# Patient Record
Sex: FEMALE | Race: WHITE | HISPANIC OR LATINO | Employment: PART TIME | URBAN - METROPOLITAN AREA
[De-identification: names, ages, dates, MRNs, and addresses within clinical notes are randomized per-mention and may not be internally consistent; named-entity substitution may affect disease eponyms.]

---

## 2017-03-24 ENCOUNTER — ALLSCRIPTS OFFICE VISIT (OUTPATIENT)
Dept: OTHER | Facility: OTHER | Age: 32
End: 2017-03-24

## 2018-01-13 VITALS
HEIGHT: 67 IN | DIASTOLIC BLOOD PRESSURE: 60 MMHG | WEIGHT: 164 LBS | SYSTOLIC BLOOD PRESSURE: 100 MMHG | BODY MASS INDEX: 25.74 KG/M2

## 2018-04-03 ENCOUNTER — ANNUAL EXAM (OUTPATIENT)
Dept: OBGYN CLINIC | Facility: CLINIC | Age: 33
End: 2018-04-03
Payer: COMMERCIAL

## 2018-04-03 VITALS
SYSTOLIC BLOOD PRESSURE: 102 MMHG | WEIGHT: 174 LBS | DIASTOLIC BLOOD PRESSURE: 68 MMHG | HEIGHT: 67 IN | BODY MASS INDEX: 27.31 KG/M2

## 2018-04-03 DIAGNOSIS — F32.81 PREMENSTRUAL DYSPHORIC DISORDER: ICD-10-CM

## 2018-04-03 DIAGNOSIS — Z01.419 ENCNTR FOR GYN EXAM (GENERAL) (ROUTINE) W/O ABN FINDINGS: Primary | ICD-10-CM

## 2018-04-03 PROCEDURE — S0612 ANNUAL GYNECOLOGICAL EXAMINA: HCPCS | Performed by: PHYSICIAN ASSISTANT

## 2018-04-03 RX ORDER — PHENTERMINE HYDROCHLORIDE 15 MG/1
CAPSULE ORAL
Refills: 1 | COMMUNITY
Start: 2018-01-08 | End: 2019-04-05 | Stop reason: ALTCHOICE

## 2018-04-03 RX ORDER — DROSPIRENONE AND ETHINYL ESTRADIOL 0.03MG-3MG
KIT ORAL
Qty: 112 TABLET | Refills: 0 | OUTPATIENT
Start: 2018-04-03

## 2018-04-03 RX ORDER — ALPRAZOLAM 0.5 MG/1
TABLET ORAL
COMMUNITY
End: 2019-04-05 | Stop reason: ALTCHOICE

## 2018-04-03 RX ORDER — NORETHINDRONE ACETATE AND ETHINYL ESTRADIOL, AND FERROUS FUMARATE 1MG-20(24)
1 KIT ORAL DAILY
Qty: 28 CAPSULE | Refills: 3 | Status: SHIPPED | COMMUNITY
Start: 2018-04-03 | End: 2018-08-20 | Stop reason: SDUPTHER

## 2018-04-03 RX ORDER — ALPRAZOLAM 0.25 MG/1
TABLET ORAL
Refills: 0 | COMMUNITY
Start: 2018-03-26 | End: 2019-10-25 | Stop reason: ALTCHOICE

## 2018-04-03 RX ORDER — DROSPIRENONE AND ETHINYL ESTRADIOL 0.03MG-3MG
KIT ORAL
Refills: 3 | COMMUNITY
Start: 2018-01-11 | End: 2018-04-03

## 2018-04-03 RX ORDER — MULTIVITAMIN
TABLET ORAL
COMMUNITY

## 2018-04-03 RX ORDER — OMEPRAZOLE 20 MG/1
CAPSULE, DELAYED RELEASE ORAL
Refills: 1 | COMMUNITY
Start: 2018-03-26 | End: 2019-04-05 | Stop reason: ALTCHOICE

## 2018-04-03 NOTE — PROGRESS NOTES
Cierra SENIOR Darrell  1985      CC:  Yearly exam    S:  34 y o  female here for yearly exam  Her cycles are regular, not heavy or crampy  She is sexually active  She uses generic Keshia for contraception  She has significant PMS  In the past she didn't want to skip periods because she was always concerned that she could be pregnant  Last year we tried her on Prozac 10mg po daily for the week prior to her menses, however, she says this worsened things to the point of suicidal thoughts so she discontinued it  She believes that in the past she did very well on Loestrin 24 mood-wise but didn't like getting no bleed with this; at this point she is interested in retrying this  She did see a psychiatrist with [facility] in the past and was happy with her but her insurance changed and she could no longer see her; her insurance has now changed again and we discussed scheduling back with her  Last Pap 2016 (due 2019)      Current Outpatient Prescriptions:     ALPRAZolam (XANAX) 0 25 mg tablet, TK 1 T PO BID, Disp: , Rfl: 0    ALPRAZolam (XANAX) 0 5 mg tablet, Take by mouth, Disp: , Rfl:     Multiple Vitamin (MULTI-VITAMIN DAILY) TABS, Take by mouth, Disp: , Rfl:     NAPROXEN  MG EC tablet, TK 1 T PO BID, Disp: , Rfl: 0    omeprazole (PriLOSEC) 20 mg delayed release capsule, TK 1/2 once daily, Disp: , Rfl: 1    phentermine 15 MG capsule, TK 1 C PO ONCE A DAY, Disp: , Rfl: 1    Probiotic Product (PROBIOTIC-10) CAPS, Take by mouth, Disp: , Rfl:     MATTHEW 3-0 03 MG per tablet, TAKE 1 ACTIVE TABLET PO QD, Disp: , Rfl: 3  Social History     Social History    Marital status: /Civil Union     Spouse name: N/A    Number of children: N/A    Years of education: N/A     Occupational History    Not on file       Social History Main Topics    Smoking status: Never Smoker    Smokeless tobacco: Never Used    Alcohol use Yes      Comment: social    Drug use: No    Sexual activity: Yes     Partners: Male Birth control/ protection: OCP     Other Topics Concern    Not on file     Social History Narrative    Home environment domestic violence - denied      Family History   Problem Relation Age of Onset    Hyperlipidemia Mother     Heart disease Mother     Breast cancer Family     Diabetes Family     Heart disease Family     Colon cancer Maternal Uncle     Heart disease Father     Hyperlipidemia Father     Breast cancer Cousin       Past Medical History:   Diagnosis Date    Anxiety     Migraine         O:  Blood pressure 102/68, height 5' 6 93" (1 7 m), weight 78 9 kg (174 lb), last menstrual period 03/11/2018  Patient appears well and is not in distress  Neck is supple without masses  Breasts are symmetrical without mass, tenderness, nipple discharge, skin changes or adenopathy  Abdomen is soft and nontender without masses  External genitals are normal without lesions or rashes  Vagina is normal without discharge or bleeding  Cervix is normal without discharge or lesion  Uterus is normal, mobile, nontender without palpable mass  Adnexa are normal, nontender, without palpable mass  A:  Yearly exam      P:   Pap due 2019   Samples of [place] provided, will call with status report in a few    months   RTO one year for yearly exam or sooner as needed

## 2018-08-20 DIAGNOSIS — F32.81 PREMENSTRUAL DYSPHORIC DISORDER: Primary | ICD-10-CM

## 2018-08-20 RX ORDER — NORETHINDRONE ACETATE AND ETHINYL ESTRADIOL, AND FERROUS FUMARATE 1MG-20(24)
1 KIT ORAL DAILY
Qty: 90 CAPSULE | Refills: 2 | Status: SHIPPED | COMMUNITY
Start: 2018-08-20 | End: 2018-09-05 | Stop reason: SDUPTHER

## 2018-09-05 ENCOUNTER — TELEPHONE (OUTPATIENT)
Dept: OBGYN CLINIC | Facility: CLINIC | Age: 33
End: 2018-09-05

## 2018-09-05 DIAGNOSIS — F32.81 PREMENSTRUAL DYSPHORIC DISORDER: Primary | ICD-10-CM

## 2018-09-05 RX ORDER — NORETHINDRONE ACETATE AND ETHINYL ESTRADIOL, AND FERROUS FUMARATE 1MG-20(24)
1 KIT ORAL DAILY
Qty: 90 CAPSULE | Refills: 0 | Status: SHIPPED | COMMUNITY
Start: 2018-09-05 | End: 2019-02-25 | Stop reason: SDUPTHER

## 2019-02-25 DIAGNOSIS — F32.81 PREMENSTRUAL DYSPHORIC DISORDER: ICD-10-CM

## 2019-02-25 RX ORDER — NORETHINDRONE ACETATE AND ETHINYL ESTRADIOL, AND FERROUS FUMARATE 1MG-20(24)
1 KIT ORAL DAILY
Qty: 90 CAPSULE | Refills: 0 | Status: SHIPPED | COMMUNITY
Start: 2019-02-25 | End: 2019-04-05 | Stop reason: SDUPTHER

## 2019-02-27 ENCOUNTER — TELEPHONE (OUTPATIENT)
Dept: OBGYN CLINIC | Facility: CLINIC | Age: 34
End: 2019-02-27

## 2019-04-05 ENCOUNTER — ANNUAL EXAM (OUTPATIENT)
Dept: OBGYN CLINIC | Facility: CLINIC | Age: 34
End: 2019-04-05
Payer: COMMERCIAL

## 2019-04-05 VITALS
HEIGHT: 66 IN | DIASTOLIC BLOOD PRESSURE: 64 MMHG | WEIGHT: 178 LBS | BODY MASS INDEX: 28.61 KG/M2 | SYSTOLIC BLOOD PRESSURE: 126 MMHG

## 2019-04-05 DIAGNOSIS — F32.81 PREMENSTRUAL DYSPHORIC DISORDER: ICD-10-CM

## 2019-04-05 DIAGNOSIS — Z01.419 ENCNTR FOR GYN EXAM (GENERAL) (ROUTINE) W/O ABN FINDINGS: ICD-10-CM

## 2019-04-05 PROBLEM — F41.9 ANXIETY: Status: ACTIVE | Noted: 2019-04-05

## 2019-04-05 PROCEDURE — S0612 ANNUAL GYNECOLOGICAL EXAMINA: HCPCS | Performed by: PHYSICIAN ASSISTANT

## 2019-04-05 PROCEDURE — G0145 SCR C/V CYTO,THINLAYER,RESCR: HCPCS | Performed by: PHYSICIAN ASSISTANT

## 2019-04-05 PROCEDURE — 87624 HPV HI-RISK TYP POOLED RSLT: CPT | Performed by: PHYSICIAN ASSISTANT

## 2019-04-05 RX ORDER — OMEPRAZOLE 10 MG/1
CAPSULE, DELAYED RELEASE ORAL
Refills: 0 | COMMUNITY
Start: 2019-02-28 | End: 2021-02-22

## 2019-04-05 RX ORDER — NORETHINDRONE ACETATE AND ETHINYL ESTRADIOL, AND FERROUS FUMARATE 1MG-20(24)
1 KIT ORAL DAILY
Qty: 90 CAPSULE | Refills: 3 | Status: SHIPPED | COMMUNITY
Start: 2019-04-05 | End: 2019-05-24 | Stop reason: SDUPTHER

## 2019-04-08 LAB
HPV HR 12 DNA CVX QL NAA+PROBE: NEGATIVE
HPV16 DNA CVX QL NAA+PROBE: NEGATIVE
HPV18 DNA CVX QL NAA+PROBE: NEGATIVE

## 2019-04-09 LAB
LAB AP GYN PRIMARY INTERPRETATION: NORMAL
Lab: NORMAL

## 2019-05-24 DIAGNOSIS — F32.81 PREMENSTRUAL DYSPHORIC DISORDER: ICD-10-CM

## 2019-05-24 RX ORDER — NORETHINDRONE ACETATE AND ETHINYL ESTRADIOL, AND FERROUS FUMARATE 1MG-20(24)
KIT ORAL
Qty: 84 CAPSULE | Refills: 3 | Status: SHIPPED | OUTPATIENT
Start: 2019-05-24 | End: 2019-10-25 | Stop reason: HOSPADM

## 2019-09-26 ENCOUNTER — TELEPHONE (OUTPATIENT)
Dept: OBGYN CLINIC | Facility: CLINIC | Age: 34
End: 2019-09-26

## 2019-09-27 NOTE — TELEPHONE ENCOUNTER
Apt made for pt, she also said she is stopping he pill today,she cannot stand the side effects of it

## 2019-10-23 NOTE — PROGRESS NOTES
Dave Saleem Darrell  1985    S:  27 y o  female here for evaluation of abnormal vaginal bleeding  She has been on [place] since around April 2018 which has worked very well in terms of cycle control and control of her PMS  She started with irregualr bleeding 3-4 months ago  She can bleed every 2 weeks, as heavy as a normal period for her, or could even be a little heavier  She stopped her birth control pills on 9/27  She takes her pill at the same time every day  She is on no new medications, vitamins, or herbal supplements  She does use medicinal marijuana for her PMDD  She is sexually active with her  in a mutually monogamous relationship  She has no pain, bleeding or discomfort with intercourse  She denies vaginal discharge, itching or odor between bleeds       Past Medical History:   Diagnosis Date    Abnormal Pap smear of cervix     Anxiety     HPV (human papilloma virus) infection     Migraine      Family History   Problem Relation Age of Onset    Hyperlipidemia Mother     Heart disease Mother     Breast cancer Family     Diabetes Family     Heart disease Family     Colon cancer Maternal Uncle     Heart disease Father     Hyperlipidemia Father     Breast cancer Cousin      Social History     Socioeconomic History    Marital status: /Civil Union     Spouse name: None    Number of children: None    Years of education: None    Highest education level: None   Occupational History    None   Social Needs    Financial resource strain: None    Food insecurity:     Worry: None     Inability: None    Transportation needs:     Medical: None     Non-medical: None   Tobacco Use    Smoking status: Never Smoker    Smokeless tobacco: Never Used   Substance and Sexual Activity    Alcohol use: Yes     Comment: social    Drug use: Yes     Types: Marijuana    Sexual activity: Yes     Partners: Male     Birth control/protection: OCP   Lifestyle    Physical activity: Days per week: None     Minutes per session: None    Stress: None   Relationships    Social connections:     Talks on phone: None     Gets together: None     Attends Church service: None     Active member of club or organization: None     Attends meetings of clubs or organizations: None     Relationship status: None    Intimate partner violence:     Fear of current or ex partner: None     Emotionally abused: None     Physically abused: None     Forced sexual activity: None   Other Topics Concern    None   Social History Narrative    Home environment domestic violence - denied        Review of Systems   Respiratory: Negative  Cardiovascular: Negative  Gastrointestinal: Negative for constipation and diarrhea  Genitourinary: Negative for difficulty urinating, pelvic pain, vaginal bleeding, vaginal discharge, itching or odor  O:  /64 (BP Location: Right arm, Patient Position: Sitting, Cuff Size: Standard)   Wt 73 5 kg (162 lb)   LMP 10/16/2019   BMI 26 04 kg/m²   She appears well and is in no distress  Abdomen is soft and nontender  External genitals are normal without lesions or rashes  Vagina is normal, no discharge or bleeding noted  Cervix is normal, no lesions or discharge  Uterus is nontender, no masses  Adnexa are nontender, no pelvic masses appreciated    A/P:   Irregular bleeding  Check TSH, pelvic US  At this point she would prefer to observe and see how she does off of birth control  I did offer her a change to Cryselle versus Depo versus a Kyleena IUD - she will consider these

## 2019-10-25 ENCOUNTER — OFFICE VISIT (OUTPATIENT)
Dept: OBGYN CLINIC | Facility: CLINIC | Age: 34
End: 2019-10-25
Payer: COMMERCIAL

## 2019-10-25 VITALS — BODY MASS INDEX: 26.04 KG/M2 | WEIGHT: 162 LBS | DIASTOLIC BLOOD PRESSURE: 64 MMHG | SYSTOLIC BLOOD PRESSURE: 120 MMHG

## 2019-10-25 DIAGNOSIS — N93.9 ABNORMAL UTERINE BLEEDING (AUB): ICD-10-CM

## 2019-10-25 DIAGNOSIS — Z11.3 SCREEN FOR STD (SEXUALLY TRANSMITTED DISEASE): ICD-10-CM

## 2019-10-25 PROBLEM — N94.3 PREMENSTRUAL SYNDROME: Status: ACTIVE | Noted: 2017-03-24

## 2019-10-25 PROCEDURE — 99213 OFFICE O/P EST LOW 20 MIN: CPT | Performed by: PHYSICIAN ASSISTANT

## 2019-11-04 ENCOUNTER — HOSPITAL ENCOUNTER (OUTPATIENT)
Dept: RADIOLOGY | Age: 34
Discharge: HOME | End: 2019-11-04
Payer: COMMERCIAL

## 2019-11-04 DIAGNOSIS — N93.9 ABNORMAL UTERINE BLEEDING (AUB): ICD-10-CM

## 2019-11-04 PROCEDURE — 76830 TRANSVAGINAL US NON-OB: CPT

## 2019-11-04 PROCEDURE — 76856 US EXAM PELVIC COMPLETE: CPT

## 2019-11-05 ENCOUNTER — TELEPHONE (OUTPATIENT)
Dept: OBGYN CLINIC | Facility: CLINIC | Age: 34
End: 2019-11-05

## 2019-11-05 LAB — TSH SERPL-ACNC: 0.5 MIU/L

## 2019-11-05 NOTE — TELEPHONE ENCOUNTER
----- Message from J Luis Kwan PA-C sent at 11/5/2019 11:50 AM EST -----  Please let raulito know that her ultrasound has returned  She has a small ovarian cyst which I doubt is causing any issues  The uterus does show a probable endometrial polyp  This could be part of the reason for her abnormal bleeding  I would bring her in for a [facility]  consult with a physician

## 2019-11-05 NOTE — TELEPHONE ENCOUNTER
Patient is aware of her results and is scheduled to see Dr Clinton Gillette 11/18 for [facility]  consult

## 2019-11-18 ENCOUNTER — OFFICE VISIT (OUTPATIENT)
Dept: OBGYN CLINIC | Facility: CLINIC | Age: 34
End: 2019-11-18
Payer: COMMERCIAL

## 2019-11-18 VITALS — SYSTOLIC BLOOD PRESSURE: 120 MMHG | BODY MASS INDEX: 26.55 KG/M2 | WEIGHT: 165.2 LBS | DIASTOLIC BLOOD PRESSURE: 70 MMHG

## 2019-11-18 DIAGNOSIS — N93.9 ABNORMAL UTERINE BLEEDING (AUB): Primary | ICD-10-CM

## 2019-11-18 DIAGNOSIS — N84.0 ENDOMETRIAL POLYP: ICD-10-CM

## 2019-11-18 PROCEDURE — 99213 OFFICE O/P EST LOW 20 MIN: CPT | Performed by: STUDENT IN AN ORGANIZED HEALTH CARE EDUCATION/TRAINING PROGRAM

## 2019-11-18 NOTE — PROGRESS NOTES
GYN Consult, [facility] - Gynecology   Irma Lindy Ramírez 27 y o  female MRN: [id number]  Unit/Bed#: [facility]  Encounter: [id number]    Chief Complaint   Patient presents with    Consult     consult for [facility]         History of Present Illness     HPI:  Amanda Abebe is a 27 y o  female [address] who presents for consult regarding D+C  She has been experience abnormal uterine bleeding ever 2 weeks, heavy, soaking through super tampon every hour  She was seen by Carina Orr PA-C at which point a TSH and ultrasound were ordered  On transvaginal ultrasound she was noted to have and endometrial polyp  She was subsequently referred to me for surgical consultation  In speaking with Marli Mike today she is expecting her period soon  Heavy bleeding as described above  Does desire removal     Presents today with her mother  Review of systems:  12 point review of systems negative aside from HPI      Past Medical History:   Diagnosis Date    Abnormal Pap smear of cervix     Anxiety     HPV (human papilloma virus) infection     Migraine      Patient Active Problem List   Diagnosis    Anxiety    Premenstrual syndrome       Past Surgical History:   Procedure Laterality Date    COLPOSCOPY      with biopsy with endocervical currettage     CONTRACEPTIVE CAPSULE REMOVAL      Last assessed: 2014       OB History        1    Para   0    Term   0            AB   1    Living   0       SAB        TAB   1    Ectopic        Multiple        Live Births                     Family History   Problem Relation Age of Onset    Hyperlipidemia Mother     Heart disease Mother     Breast cancer Family     Diabetes Family     Heart disease Family     Colon cancer Maternal Uncle     Heart disease Father     Hyperlipidemia Father     Breast cancer Cousin      Social History   Social History     Substance and Sexual Activity   Alcohol Use Yes    Comment: social     Social History     Substance and Sexual Activity   Drug Use Yes    Types: Marijuana     Social History     Tobacco Use   Smoking Status Never Smoker   Smokeless Tobacco Never Used           Current Outpatient Medications:     Multiple Vitamin (MULTI-VITAMIN DAILY) TABS, Take by mouth, Disp: , Rfl:     omeprazole (PriLOSEC) 10 mg delayed release capsule, TAKE ONE CAPSULE BY MOUTH EVERY DAY, Disp: , Rfl: 0    Probiotic Product (PROBIOTIC-10) CAPS, Take by mouth, Disp: , Rfl:    No Known Allergies    Objective   Vitals: Blood pressure 120/70, weight 74 9 kg (165 lb 3 2 oz), last menstrual period 10/16/2019  Gen: NAD, AAOx3  Heart: RRR  Lungs: CTAB  Ext: non-tender to palpation    Pelvic deferred      Lab Results:   No visits with results within 1 Day(s) from this visit  Latest known visit with results is:   Orders Only on 11/04/2019   Component Date Value    TSH W/RFX TO FREE T4 11/04/2019 0 50         Assessment/Plan     Assessment:  27 y o  Jerilyn Oms with endometrial polyp, abnormal uterine bleeding  Plan:    Reviewed endometrial polyps, likely source of heavy bleeding  Also reviewed removal as polyps may hide abnormal cells, precancerous cells  She and her mother are in agreement  Consents reviewed and signed today for dilation and curettage, hysteroscopy, and polypectomy  Will be in touch [facility] surgery schedulers regarding scheduling  We discussed the possibility of performing this procedure as an office procedure - will determine if she is a candidate, also openings in the schedule  If not, will plan for surgery outpatient at [address] [place] [address] and her mother's questions were answered to their stated satisfaction

## 2019-11-20 PROBLEM — N84.0 ENDOMETRIAL POLYP: Status: ACTIVE | Noted: 2019-11-20

## 2019-11-20 PROBLEM — N93.9 ABNORMAL UTERINE BLEEDING: Status: ACTIVE | Noted: 2019-11-20

## 2019-12-26 ENCOUNTER — TELEPHONE (OUTPATIENT)
Dept: PSYCHIATRY | Facility: CLINIC | Age: 34
End: 2019-12-26

## 2019-12-26 RX ORDER — PHENTERMINE HYDROCHLORIDE 15 MG/1
15 CAPSULE ORAL EVERY MORNING
COMMUNITY
End: 2020-06-29

## 2019-12-26 RX ORDER — ASCORBIC ACID 500 MG
500 TABLET ORAL DAILY
COMMUNITY

## 2019-12-26 NOTE — PRE-PROCEDURE INSTRUCTIONS
Pre-Surgery Instructions:   Medication Instructions    ascorbic acid (VITAMIN C) 500 mg tablet Instructed patient per Anesthesia Guidelines   Multiple Vitamin (MULTI-VITAMIN DAILY) TABS Instructed patient per Anesthesia Guidelines   omeprazole (PriLOSEC) 10 mg delayed release capsule Instructed patient per Anesthesia Guidelines   phentermine 15 MG capsule Instructed patient per Anesthesia Guidelines   Probiotic Product (PROBIOTIC-10) CAPS Instructed patient per Anesthesia Guidelines  Pt instructed to stop NSAIDS and supplements one week prior to surgery  Pt verbalized understanding of shower instructions  Pt instructed to take prilosec on day of surgery with 1-2 sips of water  Pt instructed to NOT use medical marijuana on day of surgery

## 2019-12-26 NOTE — PRE-PROCEDURE INSTRUCTIONS
Pre-Surgery Instructions:   Medication Instructions    ascorbic acid (VITAMIN C) 500 mg tablet Instructed patient per Anesthesia Guidelines   Multiple Vitamin (MULTI-VITAMIN DAILY) TABS Instructed patient per Anesthesia Guidelines   omeprazole (PriLOSEC) 10 mg delayed release capsule Instructed patient per Anesthesia Guidelines   phentermine 15 MG capsule Instructed patient per Anesthesia Guidelines   Probiotic Product (PROBIOTIC-10) CAPS Instructed patient per Anesthesia Guidelines

## 2019-12-30 PROCEDURE — 99024 POSTOP FOLLOW-UP VISIT: CPT | Performed by: STUDENT IN AN ORGANIZED HEALTH CARE EDUCATION/TRAINING PROGRAM

## 2019-12-30 NOTE — H&P
H+P - Gynecology   Jeff Ramírez 27 y o  female MRN: [id number]  [facility]  Encounter: [id number]    CC: scheduled D+C hysteroscopy polypectomy    History of Present Illness     HPI:  Robert Brooks is a 27 y o  female [address] who presents for consult regarding D+C  She has been experience abnormal uterine bleeding ever 2 weeks, heavy, soaking through super tampon every hour  She was seen by Krystyna Pride PA-C at which point a TSH and ultrasound were ordered  On transvaginal ultrasound she was noted to have and endometrial polyp  She was subsequently referred to me for surgical consultation  In speaking with Mimi Nevarez today she is expecting her period soon  Heavy bleeding as described above    Does desire removal       REVIEW OF SYSTEMS  12 point ROS neg    Past Medical History:   Diagnosis Date    Abnormal Pap smear of cervix     Anxiety     HPV (human papilloma virus) infection     Migraine        Patient Active Problem List   Diagnosis    Anxiety    Premenstrual syndrome    Endometrial polyp    Abnormal uterine bleeding       Past Surgical History:   Procedure Laterality Date    COLPOSCOPY      with biopsy with endocervical currettage     CONTRACEPTIVE CAPSULE REMOVAL      Last assessed: 2014       OB History        1    Para   0    Term   0            AB   1    Living   0       SAB        TAB   1    Ectopic        Multiple        Live Births                     #: 1, Date: None, Sex: None, Weight: None, GA: None, Delivery: None, Apgar1: None, Apgar5: None, Living: None, Birth Comments: None       Family History   Problem Relation Age of Onset    Hyperlipidemia Mother     Heart disease Mother     Breast cancer Family     Diabetes Family     Heart disease Family     Colon cancer Maternal Uncle     Heart disease Father     Hyperlipidemia Father     Breast cancer Cousin        Social History   Social History     Substance and Sexual Activity   Alcohol Use Yes    Frequency: 2-3 times a week    Drinks per session: 1 or 2    Binge frequency: Never     Social History     Substance and Sexual Activity   Drug Use Yes    Types: Marijuana    Comment: medical marijuana      Social History     Tobacco Use   Smoking Status Never Smoker   Smokeless Tobacco Never Used       No current facility-administered medications for this encounter  Current Outpatient Medications:     ascorbic acid (VITAMIN C) 500 mg tablet, Take 500 mg by mouth daily, Disp: , Rfl:     Multiple Vitamin (MULTI-VITAMIN DAILY) TABS, Take by mouth, Disp: , Rfl:     omeprazole (PriLOSEC) 10 mg delayed release capsule, TAKE ONE CAPSULE BY MOUTH EVERY DAY, Disp: , Rfl: 0    phentermine 15 MG capsule, Take 15 mg by mouth every morning, Disp: , Rfl:     Probiotic Product (PROBIOTIC-10) CAPS, Take by mouth daily , Disp: , Rfl:     No Known Allergies    Objective    From visit 11/18/2019    Vitals: Blood pressure 120/70, weight 74 9 kg (165 lb 3 2 oz), last menstrual period 10/16/2019  Gen: NAD, AAOx3  Heart: RRR  Lungs: CTAB  Ext: non-tender to palpation     Pelvic deferred      Lab Results:   No visits with results within 1 Day(s) from this visit  Latest known visit with results is:   Orders Only on 11/04/2019   Component Date Value    TSH W/RFX TO FREE T4 11/04/2019 0 50         Assessment/Plan   From 11/18/2019    Assessment:  27 y o  Edger Labor with endometrial polyp, abnormal uterine bleeding  Plan:     Reviewed endometrial polyps, likely source of heavy bleeding  Also reviewed removal as polyps may hide abnormal cells, precancerous cells  She and her mother are in agreement  Consents reviewed and signed today for dilation and curettage, hysteroscopy, and polypectomy  Plan to proceed with procedure as documented

## 2020-01-05 ENCOUNTER — ANESTHESIA EVENT (OUTPATIENT)
Dept: PERIOP | Facility: HOSPITAL | Age: 35
End: 2020-01-05
Payer: COMMERCIAL

## 2020-01-05 NOTE — ANESTHESIA PREPROCEDURE EVALUATION
Review of Systems/Medical History  Patient summary reviewed  Chart reviewed      Cardiovascular  Negative cardio ROS    Pulmonary  Negative pulmonary ROS        GI/Hepatic  Negative GI/hepatic ROS               Endo/Other  Negative endo/other ROS      GYN      Comment: Dysfunction uterine bleed     Hematology  Negative hematology ROS      Musculoskeletal  Negative musculoskeletal ROS        Neurology    Headaches,    Psychology   Anxiety,              Physical Exam    Airway    Mallampati score: II  TM Distance: >3 FB  Neck ROM: full     Dental       Cardiovascular  Comment: Negative ROS,     Pulmonary      Other Findings        Anesthesia Plan  ASA Score- 2     Anesthesia Type- general with ASA Monitors  Additional Monitors:   Airway Plan: LMA  Plan Factors-    Induction- intravenous  Postoperative Plan- Plan for postoperative opioid use  Planned trial extubation    Informed Consent- Anesthetic plan and risks discussed with patient and mother  I personally reviewed this patient with the CRNA  Discussed and agreed on the Anesthesia Plan with the CRNA  Dav Coe

## 2020-01-06 ENCOUNTER — HOSPITAL ENCOUNTER (OUTPATIENT)
Facility: HOSPITAL | Age: 35
Setting detail: OUTPATIENT SURGERY
Discharge: HOME | End: 2020-01-06
Attending: STUDENT IN AN ORGANIZED HEALTH CARE EDUCATION/TRAINING PROGRAM | Admitting: STUDENT IN AN ORGANIZED HEALTH CARE EDUCATION/TRAINING PROGRAM
Payer: COMMERCIAL

## 2020-01-06 ENCOUNTER — ANESTHESIA (OUTPATIENT)
Dept: PERIOP | Facility: HOSPITAL | Age: 35
End: 2020-01-06
Payer: COMMERCIAL

## 2020-01-06 VITALS
HEART RATE: 72 BPM | DIASTOLIC BLOOD PRESSURE: 61 MMHG | OXYGEN SATURATION: 100 % | SYSTOLIC BLOOD PRESSURE: 114 MMHG | TEMPERATURE: 98.7 F | RESPIRATION RATE: 18 BRPM | HEIGHT: 66 IN | BODY MASS INDEX: 25.71 KG/M2 | WEIGHT: 160 LBS

## 2020-01-06 DIAGNOSIS — Z98.890 S/P DILATION AND CURETTAGE: Primary | ICD-10-CM

## 2020-01-06 DIAGNOSIS — N84.0 ENDOMETRIAL POLYP: ICD-10-CM

## 2020-01-06 DIAGNOSIS — N93.9 ABNORMAL UTERINE BLEEDING: ICD-10-CM

## 2020-01-06 PROCEDURE — 88305 TISSUE EXAM BY PATHOLOGIST: CPT | Performed by: PATHOLOGY

## 2020-01-06 PROCEDURE — 58558 HYSTEROSCOPY BIOPSY: CPT | Performed by: STUDENT IN AN ORGANIZED HEALTH CARE EDUCATION/TRAINING PROGRAM

## 2020-01-06 RX ORDER — LIDOCAINE HYDROCHLORIDE 10 MG/ML
INJECTION, SOLUTION EPIDURAL; INFILTRATION; INTRACAUDAL; PERINEURAL AS NEEDED
Status: DISCONTINUED | OUTPATIENT
Start: 2020-01-06 | End: 2020-01-06 | Stop reason: SURG

## 2020-01-06 RX ORDER — ONDANSETRON 2 MG/ML
4 INJECTION INTRAMUSCULAR; INTRAVENOUS ONCE AS NEEDED
Status: DISCONTINUED | OUTPATIENT
Start: 2020-01-06 | End: 2020-01-06 | Stop reason: HOSPADM

## 2020-01-06 RX ORDER — IBUPROFEN 200 MG
600 TABLET ORAL EVERY 6 HOURS PRN
Refills: 0
Start: 2020-01-06

## 2020-01-06 RX ORDER — ONDANSETRON 2 MG/ML
INJECTION INTRAMUSCULAR; INTRAVENOUS AS NEEDED
Status: DISCONTINUED | OUTPATIENT
Start: 2020-01-06 | End: 2020-01-06 | Stop reason: SURG

## 2020-01-06 RX ORDER — PROPOFOL 10 MG/ML
INJECTION, EMULSION INTRAVENOUS AS NEEDED
Status: DISCONTINUED | OUTPATIENT
Start: 2020-01-06 | End: 2020-01-06 | Stop reason: SURG

## 2020-01-06 RX ORDER — KETOROLAC TROMETHAMINE 30 MG/ML
INJECTION, SOLUTION INTRAMUSCULAR; INTRAVENOUS AS NEEDED
Status: DISCONTINUED | OUTPATIENT
Start: 2020-01-06 | End: 2020-01-06 | Stop reason: SURG

## 2020-01-06 RX ORDER — ACETAMINOPHEN 500 MG
500 TABLET ORAL EVERY 6 HOURS PRN
Qty: 30 TABLET | Refills: 0
Start: 2020-01-06 | End: 2020-01-20

## 2020-01-06 RX ORDER — FENTANYL CITRATE/PF 50 MCG/ML
25 SYRINGE (ML) INJECTION
Status: DISCONTINUED | OUTPATIENT
Start: 2020-01-06 | End: 2020-01-06 | Stop reason: HOSPADM

## 2020-01-06 RX ORDER — SODIUM CHLORIDE, SODIUM LACTATE, POTASSIUM CHLORIDE, CALCIUM CHLORIDE 600; 310; 30; 20 MG/100ML; MG/100ML; MG/100ML; MG/100ML
125 INJECTION, SOLUTION INTRAVENOUS CONTINUOUS
Status: DISCONTINUED | OUTPATIENT
Start: 2020-01-06 | End: 2020-01-06 | Stop reason: HOSPADM

## 2020-01-06 RX ORDER — MAGNESIUM HYDROXIDE 1200 MG/15ML
LIQUID ORAL AS NEEDED
Status: DISCONTINUED | OUTPATIENT
Start: 2020-01-06 | End: 2020-01-06 | Stop reason: HOSPADM

## 2020-01-06 RX ORDER — EPHEDRINE SULFATE 50 MG/ML
INJECTION INTRAVENOUS AS NEEDED
Status: DISCONTINUED | OUTPATIENT
Start: 2020-01-06 | End: 2020-01-06 | Stop reason: SURG

## 2020-01-06 RX ORDER — LIDOCAINE HYDROCHLORIDE 10 MG/ML
0.5 INJECTION, SOLUTION EPIDURAL; INFILTRATION; INTRACAUDAL; PERINEURAL ONCE AS NEEDED
Status: COMPLETED | OUTPATIENT
Start: 2020-01-06 | End: 2020-01-06

## 2020-01-06 RX ORDER — KETOROLAC TROMETHAMINE 30 MG/ML
30 INJECTION, SOLUTION INTRAMUSCULAR; INTRAVENOUS EVERY 6 HOURS
Status: DISCONTINUED | OUTPATIENT
Start: 2020-01-06 | End: 2020-01-06 | Stop reason: HOSPADM

## 2020-01-06 RX ORDER — SODIUM CHLORIDE, SODIUM LACTATE, POTASSIUM CHLORIDE, CALCIUM CHLORIDE 600; 310; 30; 20 MG/100ML; MG/100ML; MG/100ML; MG/100ML
125 INJECTION, SOLUTION INTRAVENOUS CONTINUOUS
Status: DISCONTINUED | OUTPATIENT
Start: 2020-01-06 | End: 2020-01-06

## 2020-01-06 RX ORDER — DEXAMETHASONE SODIUM PHOSPHATE 10 MG/ML
INJECTION, SOLUTION INTRAMUSCULAR; INTRAVENOUS AS NEEDED
Status: DISCONTINUED | OUTPATIENT
Start: 2020-01-06 | End: 2020-01-06 | Stop reason: SURG

## 2020-01-06 RX ORDER — HYDROMORPHONE HCL/PF 1 MG/ML
0.5 SYRINGE (ML) INJECTION
Status: DISCONTINUED | OUTPATIENT
Start: 2020-01-06 | End: 2020-01-06 | Stop reason: HOSPADM

## 2020-01-06 RX ORDER — MIDAZOLAM HYDROCHLORIDE 2 MG/2ML
INJECTION, SOLUTION INTRAMUSCULAR; INTRAVENOUS AS NEEDED
Status: DISCONTINUED | OUTPATIENT
Start: 2020-01-06 | End: 2020-01-06 | Stop reason: SURG

## 2020-01-06 RX ORDER — FENTANYL CITRATE 50 UG/ML
INJECTION, SOLUTION INTRAMUSCULAR; INTRAVENOUS AS NEEDED
Status: DISCONTINUED | OUTPATIENT
Start: 2020-01-06 | End: 2020-01-06 | Stop reason: SURG

## 2020-01-06 RX ORDER — ACETAMINOPHEN 325 MG/1
650 TABLET ORAL EVERY 6 HOURS PRN
Status: DISCONTINUED | OUTPATIENT
Start: 2020-01-06 | End: 2020-01-06 | Stop reason: HOSPADM

## 2020-01-06 RX ADMIN — LIDOCAINE HYDROCHLORIDE 0.5 ML: 10 INJECTION, SOLUTION EPIDURAL; INFILTRATION; INTRACAUDAL; PERINEURAL at 08:08

## 2020-01-06 RX ADMIN — MIDAZOLAM 2 MG: 1 INJECTION INTRAMUSCULAR; INTRAVENOUS at 08:06

## 2020-01-06 RX ADMIN — PROPOFOL 150 MG: 10 INJECTION, EMULSION INTRAVENOUS at 08:11

## 2020-01-06 RX ADMIN — KETOROLAC TROMETHAMINE 30 MG: 30 INJECTION, SOLUTION INTRAMUSCULAR at 08:39

## 2020-01-06 RX ADMIN — SODIUM CHLORIDE, SODIUM LACTATE, POTASSIUM CHLORIDE, AND CALCIUM CHLORIDE 125 ML/HR: .6; .31; .03; .02 INJECTION, SOLUTION INTRAVENOUS at 08:08

## 2020-01-06 RX ADMIN — LIDOCAINE HYDROCHLORIDE 100 MG: 10 INJECTION, SOLUTION EPIDURAL; INFILTRATION; INTRACAUDAL; PERINEURAL at 08:11

## 2020-01-06 RX ADMIN — DEXAMETHASONE SODIUM PHOSPHATE 5 MG: 10 INJECTION, SOLUTION INTRAMUSCULAR; INTRAVENOUS at 08:14

## 2020-01-06 RX ADMIN — SODIUM CHLORIDE, SODIUM LACTATE, POTASSIUM CHLORIDE, AND CALCIUM CHLORIDE: .6; .31; .03; .02 INJECTION, SOLUTION INTRAVENOUS at 08:11

## 2020-01-06 RX ADMIN — EPHEDRINE SULFATE 15 MG: 50 INJECTION, SOLUTION INTRAVENOUS at 08:15

## 2020-01-06 RX ADMIN — FENTANYL CITRATE 100 MCG: 50 INJECTION, SOLUTION INTRAMUSCULAR; INTRAVENOUS at 08:11

## 2020-01-06 RX ADMIN — ONDANSETRON 4 MG: 2 INJECTION INTRAMUSCULAR; INTRAVENOUS at 08:14

## 2020-01-06 NOTE — ANESTHESIA POSTPROCEDURE EVALUATION
Post-Op Assessment Note    CV Status:  Stable  Pain Score: 0    Pain management: adequate     Mental Status:  Alert and awake   Hydration Status:  Euvolemic   PONV Controlled:  Controlled   Airway Patency:  Patent   Post Op Vitals Reviewed: Yes      Staff: Anesthesiologist, CRNA           /74   Temp   97 8   Pulse  58   Resp   12   SpO2   100

## 2020-01-06 NOTE — DISCHARGE INSTRUCTIONS
Dilation and Curettage   WHAT YOU SHOULD KNOW:   Dilation and curettage (D and C) is a procedure to remove tissue from the lining of your uterus  AFTER YOU LEAVE:   Medicines:   · Pain medicine: You may be given medicine to take away or decrease pain  Do not wait until the pain is severe before you take your medicine  · Antibiotics: This medicine will help fight or prevent an infection  · Take your medicine as directed  Call your healthcare provider if you think your medicine is not helping or if you have side effects  Tell him if you are allergic to any medicine  Keep a list of the medicines, vitamins, and herbs you take  Include the amounts, and when and why you take them  Bring the list or the pill bottles to follow-up visits  Carry your medicine list with you in case of an emergency  Follow up with your healthcare provider as directed:  Write down your questions so you remember to ask them during your visits  Activity:  Ask your primary healthcare provider when you can return to your normal activities  Contact your primary healthcare provider if:   · You have foul-smelling vaginal discharge  · You do not get your monthly period  · You feel depressed or anxious  · You feel very tired and weak  · You have questions or concerns about your condition or care  Seek care immediately or call 911 if:   · You have heavy vaginal bleeding that soaks 1 pad in 1 hour for 2 hours in a row  · You have a fever and abdominal cramps  · Your pain does not get better, even after treatment  © 2014 [employer] is for End User's use only and may not be sold, redistributed or otherwise used for commercial purposes  All illustrations and images included in CareNotes® are the copyrighted property of [employer] , [employer]  or Raj Estevez  The above information is an  only  It is not intended as medical advice for individual conditions or treatments  Talk to your doctor, nurse or pharmacist before following any medical regimen to see if it is safe and effective for you

## 2020-01-06 NOTE — OP NOTE
OPERATIVE REPORT  PATIENT NAME: Derrick Solorzano    :  1985  MRN: [id number]  Pt Location:  OR ROOM 03    SURGERY DATE: 2020    Surgeon(s) and Role:     * Aaron Hunt MD - Primary     * Radha Best DO - Assisting    Preop Diagnosis:  Endometrial polyp [N84 0]  Abnormal uterine bleeding [N93 9]    Post-Op Diagnosis Codes:     * Endometrial polyp [N84 0]     * Abnormal uterine bleeding [N93 9]    Procedure(s) (LRB):  DILATATION AND CURETTAGE (D&C) WITH HYSTEROSCOPY Polypectomy (N/A)    Specimen(s):  ID Type Source Tests Collected by Time Destination   1 : [facility] Tissue Endometrium TISSUE EXAM Aaron Hunt MD 2020 0300    2 : POLYP Tissue Endometrium TISSUE EXAM Aaron Hunt MD 2020 0390        Estimated Blood Loss:   Minimal    Drains:  None    Anesthesia Type:   General    Operative Indications:  Endometrial polyp [N84 0]  Abnormal uterine bleeding [N93 9]    Operative Findings:  Grossly normal appearing external genitalia  On bimanual examination, uterus is noted to be anteverted, mobile, and of normal size and contour  No adnexal masses or fullness appreciated  Uterus sounds to 8cm  On hysteroscopic examination, bilateral fallopian ostia noted  Small, single polypoid structure noted within the lower uterine segment with a base at approximately 1 o'clock  Following procedure, excellent hemostasis appreciated  Total fluid deficit noted to be 100cc at completion of procedure    Complications:   None apparent    Procedure and Technique:  Brief History  All risks, benefits, and alternatives to the procedure were discussed with the patient and she had the opportunity to ask questions  Informed consent was obtained  Description of Procedure  Patient was taken to the operating room were a time out was performed to confirm correct patient and correct procedure   General LMA anesthesia (LMA) was administered and the patient was positioned on the OR table in the dorsal lithotomy position  All pressure points were padded and a olivia hugger was placed to maintain control of core body temperature  A bimanual exam was performed and the uterus was noted to be anteverted, normal in size and consistency with no palpable adnexal masses or fullness  The patient was prepped and draped in the usual sterile fashion  Operative Technique  A weighted speculum was inserted into the vagina and a Richardson retractor was used to visualize the anterior lip of the cervix, which was then grasped with a single toothed tenaculum  The uterus was sounded to 8cm  The cervix was serially dilated to 27 [address] using Rizo dilators for introduction of the hysteroscope  Hysteroscope was introduced under direct visualization using normal saline solution as the distention media  Hysteroscope was advanced to the uterine fundus and the entire uterine cavity was inspected in a systematic manner  There was noted to be the above findings  The myosure device was then introduced through the hysteroscope and the polypoid structure was resected in entirety under direct visualization  Hysteroscope with myosure device was then withdrawn and sharp curetting was performed, starting at the 12'oclock position and rotating a total of 360 degrees to cover all surfaces  Endometrial tissue was obtained and sent for pathology  The single toothed tenaculum was removed from the anterior lip of the cervix  Good hemostasis was confirmed at the tenaculum puncture sites  Weighted speculum was then removed from the vagina  Total fluid deficit at completion of procedure was noted to be 100cc  At the conclusion of the procedure, all needle, sponge, and instrument counts were noted to be correct x2  Patient tolerated the procedure well and was transferred to PACU in stable condition prior to discharge with follow up in 1-2 weeks  Dr Vera Salguero was present and participated in all key portions of the case      Patient Disposition:  PACU , hemodynamically stable and extubated and stable    SIGNATURE: Henry Andrea DO  DATE: January 6, 2020  TIME: 8:51 AM

## 2020-01-06 NOTE — INTERVAL H&P NOTE
H&P reviewed  After examining the patient I find no changes in the patients condition since the H&P had been written      Vitals:    01/06/20 0734   BP: 102/65   Pulse: 79   Resp: 18   Temp: 99 2 °F (37 3 °C)   SpO2: 99%

## 2020-01-20 ENCOUNTER — OFFICE VISIT (OUTPATIENT)
Dept: OBGYN CLINIC | Facility: CLINIC | Age: 35
End: 2020-01-20

## 2020-01-20 VITALS — SYSTOLIC BLOOD PRESSURE: 92 MMHG | DIASTOLIC BLOOD PRESSURE: 66 MMHG | WEIGHT: 161.6 LBS | BODY MASS INDEX: 26.08 KG/M2

## 2020-01-20 DIAGNOSIS — Z98.890 S/P DILATION AND CURETTAGE: Primary | ICD-10-CM

## 2020-01-20 PROCEDURE — 99024 POSTOP FOLLOW-UP VISIT: CPT | Performed by: STUDENT IN AN ORGANIZED HEALTH CARE EDUCATION/TRAINING PROGRAM

## 2020-01-20 RX ORDER — OMEPRAZOLE 40 MG/1
CAPSULE, DELAYED RELEASE ORAL
COMMUNITY
Start: 2020-01-08 | End: 2021-02-22

## 2020-01-20 RX ORDER — OMEPRAZOLE 20 MG/1
20 CAPSULE, DELAYED RELEASE ORAL DAILY
COMMUNITY
Start: 2020-01-08 | End: 2021-02-22 | Stop reason: SDUPTHER

## 2020-01-20 NOTE — PROGRESS NOTES
Post operative visit - Gynecology   Dyan Siegel 27 y o  female MRN: [id number]  89 M  [facility]  Encounter: [id number]    Chief Complaint   Patient presents with    Post-op     Had a D & C with hysteroscopy polyectomy on 2020  Had an Endometrial polyp and AUB  Pt reports doing really with no VB or pain  Is sexually active  History of Present Illness     Dyan Siegel is a 27 y o  female [address] who presents for postoperative visit  Concerns today: none    REVIEW OF SYSTEMS  12 point review of systems negative aside from HPI      Past Medical History:   Diagnosis Date    Abnormal Pap smear of cervix     Anxiety     HPV (human papilloma virus) infection     Migraine      Patient Active Problem List   Diagnosis    Anxiety    Premenstrual syndrome    Endometrial polyp    Abnormal uterine bleeding    S/P dilation and curettage       Past Surgical History:   Procedure Laterality Date    COLPOSCOPY      with biopsy with endocervical currettage     CONTRACEPTIVE CAPSULE REMOVAL      Last assessed: 2014    OH HYSTEROSCOPY,W/ENDO BX N/A 2020    Procedure: DILATATION AND CURETTAGE (D&C) WITH HYSTEROSCOPY Polypectomy;  Surgeon: Richar Collins MD;  Location: [facility];  Service: Gynecology       OB History        1    Para   0    Term   0            AB   1    Living   0       SAB        TAB   1    Ectopic        Multiple        Live Births                         Social History   Social History     Substance and Sexual Activity   Alcohol Use Yes    Frequency: 2-3 times a week    Drinks per session: 1 or 2    Binge frequency: Never     Social History     Substance and Sexual Activity   Drug Use Yes    Types: Marijuana    Comment: medical marijuana      Social History     Tobacco Use   Smoking Status Never Smoker   Smokeless Tobacco Never Used         Current Outpatient Medications:     ascorbic acid (VITAMIN C) 500 mg tablet, Take 500 mg by mouth daily, Disp: , Rfl:     ibuprofen (MOTRIN) 200 mg tablet, Take 3 tablets (600 mg total) by mouth every 6 (six) hours as needed for cramping (Patient taking differently: Take 600 mg by mouth as needed for cramping ), Disp: , Rfl: 0    Multiple Vitamin (MULTI-VITAMIN DAILY) TABS, Take by mouth, Disp: , Rfl:     omeprazole (PriLOSEC) 10 mg delayed release capsule, TAKE ONE CAPSULE BY MOUTH EVERY DAY, Disp: , Rfl: 0    omeprazole (PriLOSEC) 20 mg delayed release capsule, , Disp: , Rfl:     omeprazole (PriLOSEC) 40 MG capsule, , Disp: , Rfl:     phentermine 15 MG capsule, Take 15 mg by mouth every morning, Disp: , Rfl:     Probiotic Product (PROBIOTIC-10) CAPS, Take by mouth daily , Disp: , Rfl:     No Known Allergies    Objective   Vitals: Blood pressure 92/66, weight 73 3 kg (161 lb 9 6 oz)  Body mass index is 26 08 kg/m²  General: NAD, AAOx3  Heart: RRR  Lungs: CTAB    Speculum exam: Normal appearing external genitalia, normal hair distribution  Urethra well-suspended, no clitoromegaly noted  Vagina pink, moist, well-rugated  Physiologic discharge noted  Cervix without lesion, nulliparous appearing  No blood in vaginal vault    Pelvic exam: No cervical motion tenderness  No adnexal masses or tenderness  Anteverted uterus, normal size  Lab Results:   No visits with results within 1 Day(s) from this visit     Latest known visit with results is:   Admission on 01/06/2020, Discharged on 01/06/2020   Component Date Value    Case Report 01/06/2020                      Value:Surgical Pathology Report                         Case: [id number]                                   Authorizing Provider:  Kari Spicer MD          Collected:           01/06/2020 6265              Ordering Location:     [address] [place] [address]      Received:            01/06/2020 [facility] Operating Room                                                      Pathologist: Juan Carlos Melo MD                                                   Specimens:   A) - Endometrium, EMC                                                                               B) - Endometrium, POLYP                                                                    Final Diagnosis 01/06/2020                      Value: This result contains rich text formatting which cannot be displayed here   Additional Information 01/06/2020                      Value: This result contains rich text formatting which cannot be displayed here  Barry Arzola Description 01/06/2020                      Value: This result contains rich text formatting which cannot be displayed here  Assessment/Plan     Assessment:  27 y o  [address] with normal postoperative exam     Diagnoses and all orders for this visit:    S/P dilation and curettage   - Reviewed pathology report with [facility] GEOVANNY and her mother, no evidence malignancy   - Bleeding has resolved since her procedure   - Plan to restart Cori Cierra for contraception at this time - condoms in the interim    No history of deep vein thrombosis, pulmonary embolism, stroke, hypertension, smoking, or migraines with aura  Reviewed slightly increased risk of the above with estrogen-containing pills      Other orders  -     omeprazole (PriLOSEC) 40 MG capsule  -     omeprazole (PriLOSEC) 20 mg delayed release capsule    Return for annual with Mary Alice King or NEENA

## 2020-03-30 ENCOUNTER — TELEPHONE (OUTPATIENT)
Dept: OBGYN CLINIC | Facility: CLINIC | Age: 35
End: 2020-03-30

## 2020-04-06 ENCOUNTER — TELEPHONE (OUTPATIENT)
Dept: OBGYN CLINIC | Facility: CLINIC | Age: 35
End: 2020-04-06

## 2020-04-06 DIAGNOSIS — N93.9 ABNORMAL UTERINE BLEEDING: Primary | ICD-10-CM

## 2020-04-07 ENCOUNTER — TELEPHONE (OUTPATIENT)
Dept: OBGYN CLINIC | Facility: CLINIC | Age: 35
End: 2020-04-07

## 2020-04-08 RX ORDER — NORETHINDRONE ACETATE AND ETHINYL ESTRADIOL AND FERROUS FUMARATE 1MG-20(24)
1 KIT ORAL DAILY
Qty: 84 TABLET | Refills: 3 | Status: SHIPPED | OUTPATIENT
Start: 2020-04-08 | End: 2021-02-18 | Stop reason: ALTCHOICE

## 2020-06-29 ENCOUNTER — ANNUAL EXAM (OUTPATIENT)
Dept: OBGYN CLINIC | Facility: CLINIC | Age: 35
End: 2020-06-29
Payer: COMMERCIAL

## 2020-06-29 VITALS
SYSTOLIC BLOOD PRESSURE: 102 MMHG | WEIGHT: 155.2 LBS | HEIGHT: 66 IN | TEMPERATURE: 98 F | BODY MASS INDEX: 24.94 KG/M2 | DIASTOLIC BLOOD PRESSURE: 68 MMHG

## 2020-06-29 DIAGNOSIS — Z11.3 SCREENING FOR STD (SEXUALLY TRANSMITTED DISEASE): ICD-10-CM

## 2020-06-29 PROBLEM — Z01.419 ENCNTR FOR GYN EXAM (GENERAL) (ROUTINE) W/O ABN FINDINGS: Status: ACTIVE | Noted: 2020-06-29

## 2020-06-29 PROCEDURE — S0612 ANNUAL GYNECOLOGICAL EXAMINA: HCPCS | Performed by: STUDENT IN AN ORGANIZED HEALTH CARE EDUCATION/TRAINING PROGRAM

## 2021-02-04 ENCOUNTER — OFFICE VISIT (OUTPATIENT)
Dept: OBGYN CLINIC | Facility: CLINIC | Age: 36
End: 2021-02-04
Payer: COMMERCIAL

## 2021-02-04 VITALS — SYSTOLIC BLOOD PRESSURE: 116 MMHG | BODY MASS INDEX: 26.31 KG/M2 | WEIGHT: 163 LBS | DIASTOLIC BLOOD PRESSURE: 68 MMHG

## 2021-02-04 DIAGNOSIS — N92.3 INTERMENSTRUAL BLEEDING: ICD-10-CM

## 2021-02-04 DIAGNOSIS — Z30.41 ENCOUNTER FOR SURVEILLANCE OF CONTRACEPTIVE PILLS: Primary | ICD-10-CM

## 2021-02-04 DIAGNOSIS — N94.3 PREMENSTRUAL SYNDROME: ICD-10-CM

## 2021-02-04 PROBLEM — Z01.419 ENCNTR FOR GYN EXAM (GENERAL) (ROUTINE) W/O ABN FINDINGS: Status: RESOLVED | Noted: 2020-06-29 | Resolved: 2021-02-04

## 2021-02-04 PROCEDURE — 99213 OFFICE O/P EST LOW 20 MIN: CPT | Performed by: PHYSICIAN ASSISTANT

## 2021-02-04 NOTE — PROGRESS NOTES
Assessment/Plan:    No problem-specific Assessment & Plan notes found for this encounter  Diagnoses and all orders for this visit:    Encounter for surveillance of contraceptive pills    Intermenstrual bleeding    Premenstrual syndrome        Counseled patient on other options for hormonal contraception including Nuva ring, Depo Provera injection, Nexplanon, and IUD  Discussed possible bleeding patterns, risks vs benefits, and potential side effects  Patient states she does not feel comfortable with Nuva ring  Depo Provera can take 6-12 months to fully metabolize, so she is concerned with length of side effects if she does not do well  Most interested in an IUD  Counseled patient on Mirena/Kyleena  Both are good for up to 5 years  While most women on an IUD do not get a period, regular periods or irregular bleeding can occur  It can take up to 6 months for bleeding pattern to regulate  IUD does not prevent ovulation, so there is a risk of ectopic pregnancy if a patient conceives while on an IUD  Can also cause ovarian cysts  Insertion can be more difficult and painful in a patient who has never had a vaginal delivery  Will prescribe Cytotec the night prior to insertion  There can be some cramping post insertion  Risks of IUD include migration, displacement, and expulsion  When 5 years is up, patient can have IUD removed and a new one placed if she wishes  IUD can make patients more prone to vaginal infection  Patient wishes to proceed  After discussion with Dr Trinh Gaxiola, will order [facility] as it is smaller and insertion device is narrower  Office to start process for insurance approval   Needs to be inserted during last week of pill pack  Patient can schedule appointment today  Call with any questions in the interim  Time of visit 20 minutes; >50% spent counseling  Subjective:      Patient ID: Sara Leigh is a 38 y o  female  Patient is here to discuss contraception options  She is new to our office today  Has history of D&C with polypectomy in early January; no complications post surgery  Currently taking OCP and would like to switch to another form of hormonal contraception  Does not desire childbearing at this time  Patient states she is having intermenstrual bleeding with current OCP, as well as a return of her premenstrual mood swings  Was on Taytulla previously and did well, but was switched to Loestrin several months ago, which is when current issues started  Does get a "regular period" at the end of pill pack, which lasts for 4 days  She is sexually active with her ; they are not using protection  Patient denies bowel/bladder changes, pelvic pain, bloating, abdominal pain, n/v, change in appetite, and thyroid disease  Up to date on her Pap  She is a  with a history of a pregnancy termination  The following portions of the patient's history were reviewed and updated as appropriate: allergies, current medications, past family history, past medical history, past social history, past surgical history and problem list     Review of Systems   Constitutional: Negative for appetite change and unexpected weight change  Gastrointestinal: Negative for abdominal distention, abdominal pain, constipation, diarrhea, nausea and vomiting  Genitourinary: Positive for menstrual problem (Intermenstrual bleeding)  Negative for difficulty urinating, dysuria, frequency, genital sores, hematuria, pelvic pain, urgency, vaginal bleeding, vaginal discharge and vaginal pain  Psychiatric/Behavioral:        Premenstrual mood swings           Objective:      /68 (BP Location: Left arm, Patient Position: Sitting, Cuff Size: Standard)   Wt 73 9 kg (163 lb)   LMP 2021 (Exact Date)   BMI 26 31 kg/m²          Physical Exam  Vitals signs reviewed  Constitutional:       Appearance: Normal appearance  She is well-developed and normal weight     Neurological: Mental Status: She is alert and oriented to person, place, and time  Psychiatric:         Mood and Affect: Mood normal          Behavior: Behavior normal  Behavior is cooperative  Thought Content:  Thought content normal          Judgment: Judgment normal

## 2021-02-12 ENCOUNTER — TELEPHONE (OUTPATIENT)
Dept: OBGYN CLINIC | Facility: CLINIC | Age: 36
End: 2021-02-12

## 2021-02-12 DIAGNOSIS — Z30.430 ENCOUNTER FOR IUD INSERTION: Primary | ICD-10-CM

## 2021-02-12 RX ORDER — MISOPROSTOL 200 UG/1
TABLET ORAL
Qty: 3 TABLET | Refills: 0 | Status: SHIPPED | OUTPATIENT
Start: 2021-02-12 | End: 2021-02-22

## 2021-02-12 NOTE — TELEPHONE ENCOUNTER
Spoke with patient regarding IUD insertion on 2/18  Rx for Cytotec 600 mcg by mouth once the night before insertion sent to pharmacy  Patient to take 600 mg ibuprofen 1-2 hours prior to appointment  Make sure to eat breakfast that morning  Call with any questions

## 2021-02-18 ENCOUNTER — PROCEDURE VISIT (OUTPATIENT)
Dept: OBGYN CLINIC | Facility: CLINIC | Age: 36
End: 2021-02-18
Payer: COMMERCIAL

## 2021-02-18 VITALS
DIASTOLIC BLOOD PRESSURE: 72 MMHG | HEIGHT: 66 IN | BODY MASS INDEX: 26.33 KG/M2 | SYSTOLIC BLOOD PRESSURE: 110 MMHG | WEIGHT: 163.8 LBS

## 2021-02-18 DIAGNOSIS — Z30.430 ENCOUNTER FOR IUD INSERTION: Primary | ICD-10-CM

## 2021-02-18 LAB — SL AMB POCT URINE HCG: NEGATIVE

## 2021-02-18 PROCEDURE — 58300 INSERT INTRAUTERINE DEVICE: CPT | Performed by: PHYSICIAN ASSISTANT

## 2021-02-18 PROCEDURE — 81025 URINE PREGNANCY TEST: CPT | Performed by: PHYSICIAN ASSISTANT

## 2021-02-18 NOTE — PATIENT INSTRUCTIONS
Stop OCP  Can use ibuprofen and a heating pad if needed  Call immediately if severe pelvic pain or heavy vaginal bleeding occur

## 2021-02-18 NOTE — PROGRESS NOTES
Iud insertions    Date/Time: 2/18/2021 8:51 AM  Performed by: Eun Linares PA-C  Authorized by: Eun Linares PA-C   Universal Protocol:  Procedure performed by: Eun Linares PA-C w/Dr Julian Pollard supervising)  Consent: Verbal consent obtained  Risks and benefits: risks, benefits and alternatives were discussed  Consent given by: patient  Time out: Immediately prior to procedure a "time out" was called to verify the correct patient, procedure, equipment, support staff and site/side marked as required  Patient understanding: patient states understanding of the procedure being performed  Patient consent: the patient's understanding of the procedure matches consent given  Procedure consent: procedure consent matches procedure scheduled  Test results: test results available and properly labeled  Required items: required blood products, implants, devices, and special equipment available  Patient identity confirmed: verbally with patient        Procedure:     Pelvic exam performed: yes      Negative GC/chlamydia test: no      Negative urine pregnancy test: yes      Negative serum pregnancy test: no      Cervix cleaned and prepped: yes      Speculum placed in vagina: yes      Tenaculum applied to cervix: yes      Uterus sounded: yes      Uterus sound depth (cm):  7    IUD inserted with no complications: yes      IUD type: Carlen Blue  Strings trimmed: yes    Post-procedure:     Patient tolerated procedure well: yes      Patient will follow up after next period: yes    Comments:      Patient is here for Carlen Blue insertion  She is switching contraception from OCPs; currently in the last week of her pill pack  Took Cytotec last night  Counseled on Carlen Blue  IUD is effective for 5 years  May not get a period while IUD in place, but could have a regular period or irregular bleeding  Can take up to 6 months for bleeding cycle to regulate  May have some cramping for the next few days    Can take ibuprofen and use a heating pad if needed  All questions answered; she wishes to proceed  UPT in office negative  Patient placed in lithotomy position  Pelvic exam performed to confirm anterior position of uterus  Speculum placed with good visualization of the cervix  Cervix and vagina cleaned with betadine swab x 2  Tenaculum placed, and uterus sounded to 7 cm  Dilators used for internal os x 3  Lavada Sidles IUD placed without issue  Strings trimmed  Speculum removed  Ultrasound confirmed placement of IUD with no evidence of perforation  Patient tolerated procedure well; no immediate complications  Stop OCP  Can use ibuprofen and a heating pad if needed  Call immediately if severe pelvic pain or heavy bleeding occur  F/u in 5 weeks for recheck

## 2021-02-22 ENCOUNTER — OFFICE VISIT (OUTPATIENT)
Dept: INTERNAL MEDICINE CLINIC | Facility: CLINIC | Age: 36
End: 2021-02-22
Payer: COMMERCIAL

## 2021-02-22 VITALS
BODY MASS INDEX: 26 KG/M2 | SYSTOLIC BLOOD PRESSURE: 112 MMHG | TEMPERATURE: 97.4 F | DIASTOLIC BLOOD PRESSURE: 70 MMHG | HEART RATE: 68 BPM | WEIGHT: 161.8 LBS | HEIGHT: 66 IN

## 2021-02-22 DIAGNOSIS — E78.2 MIXED HYPERLIPIDEMIA: Primary | ICD-10-CM

## 2021-02-22 DIAGNOSIS — F41.9 ANXIETY: ICD-10-CM

## 2021-02-22 DIAGNOSIS — K21.9 GASTROESOPHAGEAL REFLUX DISEASE WITHOUT ESOPHAGITIS: ICD-10-CM

## 2021-02-22 PROCEDURE — 3725F SCREEN DEPRESSION PERFORMED: CPT | Performed by: INTERNAL MEDICINE

## 2021-02-22 PROCEDURE — 99214 OFFICE O/P EST MOD 30 MIN: CPT | Performed by: INTERNAL MEDICINE

## 2021-02-22 PROCEDURE — 1036F TOBACCO NON-USER: CPT | Performed by: INTERNAL MEDICINE

## 2021-02-22 RX ORDER — PHENTERMINE HYDROCHLORIDE 15 MG/1
15 CAPSULE ORAL EVERY MORNING
Qty: 30 CAPSULE | Refills: 0 | Status: SHIPPED | OUTPATIENT
Start: 2021-02-22 | End: 2021-04-07 | Stop reason: SDUPTHER

## 2021-02-22 RX ORDER — OMEPRAZOLE 10 MG/1
10 CAPSULE, DELAYED RELEASE ORAL DAILY
Qty: 90 CAPSULE | Refills: 0 | Status: SHIPPED | OUTPATIENT
Start: 2021-02-22 | End: 2021-05-21

## 2021-02-22 RX ORDER — OMEPRAZOLE 20 MG/1
20 CAPSULE, DELAYED RELEASE ORAL DAILY
Qty: 90 CAPSULE | Refills: 1 | Status: SHIPPED | OUTPATIENT
Start: 2021-02-22 | End: 2022-01-05 | Stop reason: SDUPTHER

## 2021-02-22 RX ORDER — ALPRAZOLAM 0.25 MG/1
0.25 TABLET ORAL
Qty: 30 TABLET | Refills: 0 | Status: SHIPPED | OUTPATIENT
Start: 2021-02-22

## 2021-02-22 NOTE — PROGRESS NOTES
Assessment/Plan:    BMI Counseling: Body mass index is 26 12 kg/m²  The BMI is above normal  Nutrition recommendations include decreasing portion sizes, encouraging healthy choices of fruits and vegetables and decreasing fast food intake  Exercise recommendations include moderate physical activity 150 minutes/week  1  Mixed hyperlipidemia  -     Comprehensive metabolic panel; Future  -     Lipid Panel with Direct LDL reflex; Future  -     TSH, 3rd generation; Future    2  Gastroesophageal reflux disease without esophagitis  -     CBC and differential; Future  -     omeprazole (PriLOSEC) 20 mg delayed release capsule; Take 1 capsule (20 mg total) by mouth daily  -     omeprazole (PriLOSEC) 10 mg delayed release capsule; Take 1 capsule (10 mg total) by mouth daily    3  Anxiety  -     ALPRAZolam (XANAX) 0 25 mg tablet; Take 1 tablet (0 25 mg total) by mouth daily at bedtime as needed for anxiety    4  Body mass index 26 0-26 9, adult  -     phentermine 15 MG capsule; Take 1 capsule (15 mg total) by mouth every morning           Subjective:      Patient ID: Gagan Castro is a 38 y o  female  Follow-up on multiple medical problems to ensure the stable on current medications      The following portions of the patient's history were reviewed and updated as appropriate: She  has a past medical history of Abnormal Pap smear of cervix, Allergic rhinitis, Anxiety, Anxiety and depression, Chronic post-traumatic stress disorder (PTSD), GERD (gastroesophageal reflux disease), HPV (human papilloma virus) infection, colposcopy with cervical biopsy, Hyperlipidemia, and Migraine    She   Patient Active Problem List    Diagnosis Date Noted    Body mass index 26 0-26 9, adult 02/22/2021    GERD (gastroesophageal reflux disease)     Hyperlipidemia     S/P dilation and curettage 01/06/2020    Endometrial polyp 11/20/2019    Abnormal uterine bleeding 11/20/2019    Anxiety 04/05/2019    Premenstrual syndrome 03/24/2017     She  has a past surgical history that includes Colposcopy; Contraceptive capsule removal; pr hysteroscopy,w/endo bx (N/A, 1/6/2020); and Tonsillectomy  Her family history includes Breast cancer in her cousin and family; Colon cancer in her maternal uncle; Diabetes in her family; Diabetes type II in her family; Heart disease in her family, father, and mother; Hyperlipidemia in her father and mother; Hypertension in her family  She  reports that she has never smoked  She has never used smokeless tobacco  She reports current alcohol use  She reports current drug use  Drug: Marijuana  Current Outpatient Medications   Medication Sig Dispense Refill    ascorbic acid (VITAMIN C) 500 mg tablet Take 500 mg by mouth daily      ibuprofen (MOTRIN) 200 mg tablet Take 3 tablets (600 mg total) by mouth every 6 (six) hours as needed for cramping (Patient taking differently: Take 600 mg by mouth as needed for cramping )  0    Multiple Vitamin (MULTI-VITAMIN DAILY) TABS Take by mouth      omeprazole (PriLOSEC) 20 mg delayed release capsule Take 1 capsule (20 mg total) by mouth daily 90 capsule 1    Probiotic Product (PROBIOTIC-10) CAPS Take by mouth daily       ALPRAZolam (XANAX) 0 25 mg tablet Take 1 tablet (0 25 mg total) by mouth daily at bedtime as needed for anxiety 30 tablet 0    omeprazole (PriLOSEC) 10 mg delayed release capsule Take 1 capsule (10 mg total) by mouth daily 90 capsule 0    phentermine 15 MG capsule Take 1 capsule (15 mg total) by mouth every morning 30 capsule 0     No current facility-administered medications for this visit        Current Outpatient Medications on File Prior to Visit   Medication Sig    ascorbic acid (VITAMIN C) 500 mg tablet Take 500 mg by mouth daily    ibuprofen (MOTRIN) 200 mg tablet Take 3 tablets (600 mg total) by mouth every 6 (six) hours as needed for cramping (Patient taking differently: Take 600 mg by mouth as needed for cramping )    Multiple Vitamin (MULTI-VITAMIN DAILY) TABS Take by mouth    Probiotic Product (PROBIOTIC-10) CAPS Take by mouth daily     [DISCONTINUED] omeprazole (PriLOSEC) 20 mg delayed release capsule Take 20 mg by mouth daily     [DISCONTINUED] misoprostol (CYTOTEC) 200 mcg tablet Take 3 tablets by mouth once the night prior to IUD insertion  (Patient not taking: Reported on 2/22/2021)    [DISCONTINUED] omeprazole (PriLOSEC) 10 mg delayed release capsule TAKE ONE CAPSULE BY MOUTH EVERY DAY    [DISCONTINUED] omeprazole (PriLOSEC) 40 MG capsule      No current facility-administered medications on file prior to visit  She has No Known Allergies       Review of Systems   Constitutional: Negative for chills and fever  HENT: Negative for congestion, ear pain and sore throat  Eyes: Negative for pain  Respiratory: Negative for cough and shortness of breath  Cardiovascular: Negative for chest pain and leg swelling  Gastrointestinal: Negative for abdominal pain, nausea and vomiting  Endocrine: Negative for polyuria  Genitourinary: Negative for difficulty urinating, frequency and urgency  Musculoskeletal: Negative for arthralgias and back pain  Skin: Negative for rash  Neurological: Negative for weakness and headaches  Psychiatric/Behavioral: Negative for sleep disturbance  The patient is not nervous/anxious  Objective:      /70 (BP Location: Left arm, Patient Position: Sitting)   Pulse 68   Temp (!) 97 4 °F (36 3 °C)   Ht 5' 6" (1 676 m)   Wt 73 4 kg (161 lb 12 8 oz)   BMI 26 12 kg/m²     Recent Results (from the past 1344 hour(s))   POCT urine HCG    Collection Time: 02/18/21  8:15 AM   Result Value Ref Range    URINE HCG Negative         Physical Exam  Constitutional:       Appearance: Normal appearance  HENT:      Head: Normocephalic        Right Ear: Tympanic membrane, ear canal and external ear normal       Left Ear: Tympanic membrane, ear canal and external ear normal       Nose: Nose normal  No congestion  Mouth/Throat:      Mouth: Mucous membranes are moist       Pharynx: Oropharynx is clear  No oropharyngeal exudate or posterior oropharyngeal erythema  Eyes:      Extraocular Movements: Extraocular movements intact  Conjunctiva/sclera: Conjunctivae normal       Pupils: Pupils are equal, round, and reactive to light  Neck:      Musculoskeletal: Normal range of motion and neck supple  Cardiovascular:      Rate and Rhythm: Normal rate and regular rhythm  Heart sounds: Normal heart sounds  No murmur  Pulmonary:      Effort: Pulmonary effort is normal       Breath sounds: Normal breath sounds  No wheezing or rales  Abdominal:      General: Bowel sounds are normal  There is no distension  Palpations: Abdomen is soft  Tenderness: There is no abdominal tenderness  Musculoskeletal: Normal range of motion  Right lower leg: No edema  Left lower leg: No edema  Lymphadenopathy:      Cervical: No cervical adenopathy  Skin:     General: Skin is warm  Neurological:      General: No focal deficit present  Mental Status: She is alert and oriented to person, place, and time

## 2021-03-02 LAB
ALBUMIN SERPL-MCNC: 3.8 G/DL (ref 3.6–5.1)
ALBUMIN/GLOB SERPL: 1.3 (CALC) (ref 1–2.5)
ALP SERPL-CCNC: 56 U/L (ref 31–125)
ALT SERPL-CCNC: 17 U/L (ref 6–29)
AST SERPL-CCNC: 14 U/L (ref 10–30)
BASOPHILS # BLD AUTO: 79 CELLS/UL (ref 0–200)
BASOPHILS NFR BLD AUTO: 1.3 %
BILIRUB SERPL-MCNC: 0.5 MG/DL (ref 0.2–1.2)
BUN SERPL-MCNC: 11 MG/DL (ref 7–25)
BUN/CREAT SERPL: NORMAL (CALC) (ref 6–22)
CALCIUM SERPL-MCNC: 9.3 MG/DL (ref 8.6–10.2)
CHLORIDE SERPL-SCNC: 103 MMOL/L (ref 98–110)
CHOLEST SERPL-MCNC: 225 MG/DL
CHOLEST/HDLC SERPL: 4.7 (CALC)
CO2 SERPL-SCNC: 28 MMOL/L (ref 20–32)
CREAT SERPL-MCNC: 0.76 MG/DL (ref 0.5–1.1)
EOSINOPHIL # BLD AUTO: 189 CELLS/UL (ref 15–500)
EOSINOPHIL NFR BLD AUTO: 3.1 %
ERYTHROCYTE [DISTWIDTH] IN BLOOD BY AUTOMATED COUNT: 12.7 % (ref 11–15)
GLOBULIN SER CALC-MCNC: 3 G/DL (CALC) (ref 1.9–3.7)
GLUCOSE SERPL-MCNC: 88 MG/DL (ref 65–99)
HCT VFR BLD AUTO: 39.6 % (ref 35–45)
HDLC SERPL-MCNC: 48 MG/DL
HGB BLD-MCNC: 12.9 G/DL (ref 11.7–15.5)
LDLC SERPL CALC-MCNC: 158 MG/DL (CALC)
LYMPHOCYTES # BLD AUTO: 2190 CELLS/UL (ref 850–3900)
LYMPHOCYTES NFR BLD AUTO: 35.9 %
MCH RBC QN AUTO: 27.1 PG (ref 27–33)
MCHC RBC AUTO-ENTMCNC: 32.6 G/DL (ref 32–36)
MCV RBC AUTO: 83.2 FL (ref 80–100)
MONOCYTES # BLD AUTO: 653 CELLS/UL (ref 200–950)
MONOCYTES NFR BLD AUTO: 10.7 %
NEUTROPHILS # BLD AUTO: 2989 CELLS/UL (ref 1500–7800)
NEUTROPHILS NFR BLD AUTO: 49 %
NONHDLC SERPL-MCNC: 177 MG/DL (CALC)
PLATELET # BLD AUTO: 398 THOUSAND/UL (ref 140–400)
PMV BLD REES-ECKER: 9.8 FL (ref 7.5–12.5)
POTASSIUM SERPL-SCNC: 4.4 MMOL/L (ref 3.5–5.3)
PROT SERPL-MCNC: 6.8 G/DL (ref 6.1–8.1)
RBC # BLD AUTO: 4.76 MILLION/UL (ref 3.8–5.1)
SL AMB EGFR AFRICAN AMERICAN: 117 ML/MIN/1.73M2
SL AMB EGFR NON AFRICAN AMERICAN: 101 ML/MIN/1.73M2
SODIUM SERPL-SCNC: 137 MMOL/L (ref 135–146)
TRIGL SERPL-MCNC: 88 MG/DL
TSH SERPL-ACNC: 0.49 MIU/L
WBC # BLD AUTO: 6.1 THOUSAND/UL (ref 3.8–10.8)

## 2021-03-23 ENCOUNTER — OFFICE VISIT (OUTPATIENT)
Dept: OBGYN CLINIC | Facility: CLINIC | Age: 36
End: 2021-03-23
Payer: COMMERCIAL

## 2021-03-23 VITALS
HEIGHT: 66 IN | BODY MASS INDEX: 26.36 KG/M2 | SYSTOLIC BLOOD PRESSURE: 114 MMHG | WEIGHT: 164 LBS | DIASTOLIC BLOOD PRESSURE: 70 MMHG

## 2021-03-23 DIAGNOSIS — Z30.431 IUD CHECK UP: Primary | ICD-10-CM

## 2021-03-23 PROCEDURE — 3008F BODY MASS INDEX DOCD: CPT | Performed by: INTERNAL MEDICINE

## 2021-03-23 PROCEDURE — 99213 OFFICE O/P EST LOW 20 MIN: CPT | Performed by: PHYSICIAN ASSISTANT

## 2021-03-23 NOTE — PROGRESS NOTES
Assessment/Plan:    No problem-specific Assessment & Plan notes found for this encounter  Diagnoses and all orders for this visit:    IUD check up    Other orders  -     levonorgestrel (KYLEENA) 19 5 MG intrauterine device; 1 Intra Uterine Device by Intrauterine route once        IUD visualized on U/S; no evidence of migration or perforation  Strings extracted from os by Dr Kya Wood using forceps  Discussed IUD with patient  Can take up to 6 months for bleeding pattern to regulate  Stressed consistent condom use for STD prevention  Call if bleeding worsens or new symptoms develop  If no problems, patient to return in June for yearly gyn exam   Time of visit 20 minutes; >50% spent counseling  Subjective:      Patient ID: Kay Hassan is a 38 y o  female  Patient is here for IUD f/u  Had [place] placed on 2/18/21  States she is doing well overall  Has had intermittent spotting since placement  Denies pelvic pain and cramping  Patient also denies bowel/bladder changes, bloating, abdominal pain, n/v, change in appetite, HA, and mood symptoms  The following portions of the patient's history were reviewed and updated as appropriate: allergies, current medications, past family history, past medical history, past social history, past surgical history and problem list     Review of Systems   Constitutional: Negative for appetite change and unexpected weight change  Gastrointestinal: Negative for abdominal distention, abdominal pain, constipation, diarrhea, nausea and vomiting  Genitourinary: Negative for difficulty urinating, dysuria, frequency, genital sores, hematuria, menstrual problem (Intermittent spotting), pelvic pain, urgency, vaginal bleeding, vaginal discharge and vaginal pain  Neurological: Negative for headaches  Objective:      /70   Ht 5' 6" (1 676 m)   Wt 74 4 kg (164 lb)   LMP 03/15/2021   BMI 26 47 kg/m²          Physical Exam  Vitals signs reviewed  Exam conducted with a chaperone present  Constitutional:       Appearance: Normal appearance  She is well-developed and normal weight  Genitourinary:     General: Normal vulva  Pubic Area: No rash  Labia:         Right: No rash, tenderness, lesion or injury  Left: No rash, tenderness, lesion or injury  Vagina: Normal  No vaginal discharge, erythema, tenderness or bleeding  Cervix: Normal       Uterus: Normal        Adnexa: Right adnexa normal and left adnexa normal         Right: No mass, tenderness or fullness  Left: No mass, tenderness or fullness  Comments: IUD strings initially inside os  Extracted by Dr Azam Boone using forceps  Lymphadenopathy:      Lower Body: No right inguinal adenopathy  No left inguinal adenopathy  Skin:     General: Skin is warm and dry  Neurological:      Mental Status: She is alert and oriented to person, place, and time  Psychiatric:         Mood and Affect: Mood normal          Behavior: Behavior normal  Behavior is cooperative  Thought Content:  Thought content normal          Judgment: Judgment normal

## 2021-03-23 NOTE — PATIENT INSTRUCTIONS
Practice consistent condom use for STD prevention  Call if bleeding worsens or new symptoms develop

## 2021-04-07 RX ORDER — PHENTERMINE HYDROCHLORIDE 15 MG/1
15 CAPSULE ORAL EVERY MORNING
Qty: 30 CAPSULE | Refills: 0 | Status: SHIPPED | OUTPATIENT
Start: 2021-04-07 | End: 2021-05-26 | Stop reason: SDUPTHER

## 2021-05-21 DIAGNOSIS — K21.9 GASTROESOPHAGEAL REFLUX DISEASE WITHOUT ESOPHAGITIS: ICD-10-CM

## 2021-05-21 RX ORDER — OMEPRAZOLE 10 MG/1
CAPSULE, DELAYED RELEASE ORAL
Qty: 90 CAPSULE | Refills: 0 | Status: SHIPPED | OUTPATIENT
Start: 2021-05-21

## 2021-05-26 RX ORDER — PHENTERMINE HYDROCHLORIDE 15 MG/1
15 CAPSULE ORAL EVERY MORNING
Qty: 30 CAPSULE | Refills: 0 | Status: SHIPPED | OUTPATIENT
Start: 2021-05-26 | End: 2021-08-04

## 2021-06-28 ENCOUNTER — OFFICE VISIT (OUTPATIENT)
Dept: INTERNAL MEDICINE CLINIC | Facility: CLINIC | Age: 36
End: 2021-06-28
Payer: COMMERCIAL

## 2021-06-28 VITALS
DIASTOLIC BLOOD PRESSURE: 72 MMHG | TEMPERATURE: 97.9 F | BODY MASS INDEX: 26.36 KG/M2 | WEIGHT: 164 LBS | OXYGEN SATURATION: 98 % | HEART RATE: 64 BPM | HEIGHT: 66 IN | SYSTOLIC BLOOD PRESSURE: 110 MMHG

## 2021-06-28 DIAGNOSIS — E78.2 MIXED HYPERLIPIDEMIA: ICD-10-CM

## 2021-06-28 DIAGNOSIS — F41.9 ANXIETY: ICD-10-CM

## 2021-06-28 DIAGNOSIS — Z11.59 NEED FOR HEPATITIS C SCREENING TEST: ICD-10-CM

## 2021-06-28 DIAGNOSIS — K21.9 GASTROESOPHAGEAL REFLUX DISEASE WITHOUT ESOPHAGITIS: Primary | ICD-10-CM

## 2021-06-28 PROCEDURE — 99213 OFFICE O/P EST LOW 20 MIN: CPT | Performed by: INTERNAL MEDICINE

## 2021-06-28 PROCEDURE — 3008F BODY MASS INDEX DOCD: CPT | Performed by: INTERNAL MEDICINE

## 2021-06-28 PROCEDURE — 1036F TOBACCO NON-USER: CPT | Performed by: INTERNAL MEDICINE

## 2021-06-28 NOTE — PROGRESS NOTES
Assessment/Plan:             1  Gastroesophageal reflux disease without esophagitis    2  Need for hepatitis C screening test    3  Mixed hyperlipidemia    4  Anxiety    5  Body mass index 26 0-26 9, adult           Subjective:      Patient ID: Juan Calabrese is a 38 y o  female  Follow-up on multiple medical problems to ensure they are stable on current medications      The following portions of the patient's history were reviewed and updated as appropriate: She  has a past medical history of Abnormal Pap smear of cervix, Allergic rhinitis, Anxiety, Anxiety and depression, Chronic post-traumatic stress disorder (PTSD), GERD (gastroesophageal reflux disease), HPV (human papilloma virus) infection, colposcopy with cervical biopsy, Hyperlipidemia, and Migraine  She   Patient Active Problem List    Diagnosis Date Noted    Need for hepatitis C screening test 06/28/2021    Body mass index 26 0-26 9, adult 02/22/2021    Gastroesophageal reflux disease without esophagitis     Hyperlipidemia     S/P dilation and curettage 01/06/2020    Endometrial polyp 11/20/2019    Abnormal uterine bleeding 11/20/2019    Anxiety 04/05/2019    Premenstrual syndrome 03/24/2017     She  has a past surgical history that includes Colposcopy; Contraceptive capsule removal; pr hysteroscopy,w/endo bx (N/A, 1/6/2020); and Tonsillectomy  Her family history includes Breast cancer in her cousin and family; Colon cancer in her maternal uncle; Diabetes in her family; Diabetes type II in her family; Heart disease in her family, father, and mother; Hyperlipidemia in her father and mother; Hypertension in her family  She  reports that she has never smoked  She has never used smokeless tobacco  She reports current alcohol use of about 1 0 standard drinks of alcohol per week  No history on file for drug use    Current Outpatient Medications   Medication Sig Dispense Refill    ALPRAZolam (XANAX) 0 25 mg tablet Take 1 tablet (0 25 mg total) by mouth daily at bedtime as needed for anxiety 30 tablet 0    ascorbic acid (VITAMIN C) 500 mg tablet Take 500 mg by mouth daily      ibuprofen (MOTRIN) 200 mg tablet Take 3 tablets (600 mg total) by mouth every 6 (six) hours as needed for cramping (Patient taking differently: Take 600 mg by mouth as needed for cramping )  0    Multiple Vitamin (MULTI-VITAMIN DAILY) TABS Take by mouth      omeprazole (PriLOSEC) 20 mg delayed release capsule Take 1 capsule (20 mg total) by mouth daily 90 capsule 1    phentermine 15 MG capsule Take 1 capsule (15 mg total) by mouth every morning 30 capsule 0    Probiotic Product (PROBIOTIC-10) CAPS Take by mouth daily       levonorgestrel (KYLEENA) 19 5 MG intrauterine device 1 Intra Uterine Device by Intrauterine route once (Patient not taking: Reported on 6/28/2021)      omeprazole (PriLOSEC) 10 mg delayed release capsule TAKE 1 CAPSULE BY MOUTH EVERY DAY (Patient not taking: Reported on 6/28/2021) 90 capsule 0     No current facility-administered medications for this visit       Current Outpatient Medications on File Prior to Visit   Medication Sig    ALPRAZolam (XANAX) 0 25 mg tablet Take 1 tablet (0 25 mg total) by mouth daily at bedtime as needed for anxiety    ascorbic acid (VITAMIN C) 500 mg tablet Take 500 mg by mouth daily    ibuprofen (MOTRIN) 200 mg tablet Take 3 tablets (600 mg total) by mouth every 6 (six) hours as needed for cramping (Patient taking differently: Take 600 mg by mouth as needed for cramping )    Multiple Vitamin (MULTI-VITAMIN DAILY) TABS Take by mouth    omeprazole (PriLOSEC) 20 mg delayed release capsule Take 1 capsule (20 mg total) by mouth daily    phentermine 15 MG capsule Take 1 capsule (15 mg total) by mouth every morning    Probiotic Product (PROBIOTIC-10) CAPS Take by mouth daily     levonorgestrel (KYLEENA) 19 5 MG intrauterine device 1 Intra Uterine Device by Intrauterine route once (Patient not taking: Reported on 6/28/2021)    omeprazole (PriLOSEC) 10 mg delayed release capsule TAKE 1 CAPSULE BY MOUTH EVERY DAY (Patient not taking: Reported on 6/28/2021)     No current facility-administered medications on file prior to visit  She has No Known Allergies       Review of Systems   Constitutional: Negative for chills and fever  HENT: Negative for congestion, ear pain and sore throat  Eyes: Negative for pain  Respiratory: Negative for cough and shortness of breath  Cardiovascular: Negative for chest pain and leg swelling  Gastrointestinal: Negative for abdominal pain, nausea and vomiting  Endocrine: Negative for polyuria  Genitourinary: Negative for difficulty urinating, frequency and urgency  Musculoskeletal: Negative for arthralgias and back pain  Skin: Negative for rash  Neurological: Negative for weakness and headaches  Psychiatric/Behavioral: Negative for sleep disturbance  The patient is not nervous/anxious  Objective:      /72 (BP Location: Left arm, Patient Position: Sitting, Cuff Size: Standard)   Pulse 64   Temp 97 9 °F (36 6 °C) (Temporal)   Ht 5' 6" (1 676 m)   Wt 74 4 kg (164 lb)   SpO2 98%   BMI 26 47 kg/m²     No results found for this or any previous visit (from the past 1344 hour(s))  Physical Exam  Constitutional:       Appearance: Normal appearance  HENT:      Head: Normocephalic  Right Ear: Tympanic membrane, ear canal and external ear normal       Left Ear: Tympanic membrane, ear canal and external ear normal       Nose: Nose normal  No congestion  Mouth/Throat:      Mouth: Mucous membranes are moist       Pharynx: Oropharynx is clear  No oropharyngeal exudate or posterior oropharyngeal erythema  Eyes:      Extraocular Movements: Extraocular movements intact  Conjunctiva/sclera: Conjunctivae normal       Pupils: Pupils are equal, round, and reactive to light  Cardiovascular:      Rate and Rhythm: Normal rate and regular rhythm  Heart sounds: Normal heart sounds  No murmur heard  Pulmonary:      Effort: Pulmonary effort is normal       Breath sounds: Normal breath sounds  No wheezing or rales  Abdominal:      General: Bowel sounds are normal  There is no distension  Palpations: Abdomen is soft  Tenderness: There is no abdominal tenderness  Musculoskeletal:         General: Normal range of motion  Cervical back: Normal range of motion and neck supple  Right lower leg: No edema  Left lower leg: No edema  Lymphadenopathy:      Cervical: No cervical adenopathy  Skin:     General: Skin is warm  Neurological:      General: No focal deficit present  Mental Status: She is alert and oriented to person, place, and time

## 2021-08-04 RX ORDER — PHENTERMINE HYDROCHLORIDE 15 MG/1
CAPSULE ORAL
Qty: 30 CAPSULE | Refills: 0 | Status: SHIPPED | OUTPATIENT
Start: 2021-08-04 | End: 2021-08-31 | Stop reason: SDUPTHER

## 2021-08-31 NOTE — TELEPHONE ENCOUNTER
TOO SOON TO FILL CONTROLLED MED  HOLD UNTIL 09/03/2021 FOR REFILL      NOV 01/05/2022  LOV 06/28/2021

## 2021-09-03 RX ORDER — PHENTERMINE HYDROCHLORIDE 15 MG/1
15 CAPSULE ORAL DAILY
Qty: 30 CAPSULE | Refills: 0 | Status: SHIPPED | OUTPATIENT
Start: 2021-09-03 | End: 2021-10-26 | Stop reason: SDUPTHER

## 2021-10-25 ENCOUNTER — PATIENT MESSAGE (OUTPATIENT)
Dept: INTERNAL MEDICINE CLINIC | Facility: CLINIC | Age: 36
End: 2021-10-25

## 2021-10-26 RX ORDER — PHENTERMINE HYDROCHLORIDE 15 MG/1
15 CAPSULE ORAL DAILY
Qty: 30 CAPSULE | Refills: 0 | Status: SHIPPED | OUTPATIENT
Start: 2021-10-26 | End: 2021-12-05 | Stop reason: SDUPTHER

## 2021-12-08 RX ORDER — PHENTERMINE HYDROCHLORIDE 15 MG/1
15 CAPSULE ORAL DAILY
Qty: 30 CAPSULE | Refills: 0 | Status: SHIPPED | OUTPATIENT
Start: 2021-12-08 | End: 2022-01-05 | Stop reason: SDUPTHER

## 2022-01-05 ENCOUNTER — OFFICE VISIT (OUTPATIENT)
Dept: INTERNAL MEDICINE CLINIC | Facility: CLINIC | Age: 37
End: 2022-01-05
Payer: COMMERCIAL

## 2022-01-05 VITALS
DIASTOLIC BLOOD PRESSURE: 68 MMHG | WEIGHT: 174 LBS | BODY MASS INDEX: 27.97 KG/M2 | HEART RATE: 72 BPM | TEMPERATURE: 98.6 F | OXYGEN SATURATION: 98 % | SYSTOLIC BLOOD PRESSURE: 108 MMHG | HEIGHT: 66 IN

## 2022-01-05 DIAGNOSIS — K21.9 GASTROESOPHAGEAL REFLUX DISEASE WITHOUT ESOPHAGITIS: ICD-10-CM

## 2022-01-05 DIAGNOSIS — Z00.00 ANNUAL PHYSICAL EXAM: ICD-10-CM

## 2022-01-05 DIAGNOSIS — E78.2 MIXED HYPERLIPIDEMIA: Primary | ICD-10-CM

## 2022-01-05 DIAGNOSIS — F41.9 ANXIETY: ICD-10-CM

## 2022-01-05 PROCEDURE — 3725F SCREEN DEPRESSION PERFORMED: CPT | Performed by: INTERNAL MEDICINE

## 2022-01-05 PROCEDURE — 99214 OFFICE O/P EST MOD 30 MIN: CPT | Performed by: INTERNAL MEDICINE

## 2022-01-05 PROCEDURE — 1036F TOBACCO NON-USER: CPT | Performed by: INTERNAL MEDICINE

## 2022-01-05 PROCEDURE — 3008F BODY MASS INDEX DOCD: CPT | Performed by: INTERNAL MEDICINE

## 2022-01-05 PROCEDURE — 99395 PREV VISIT EST AGE 18-39: CPT | Performed by: INTERNAL MEDICINE

## 2022-01-05 RX ORDER — OMEPRAZOLE 20 MG/1
20 CAPSULE, DELAYED RELEASE ORAL DAILY
Qty: 90 CAPSULE | Refills: 1 | Status: SHIPPED | OUTPATIENT
Start: 2022-01-05

## 2022-01-05 RX ORDER — PHENTERMINE HYDROCHLORIDE 15 MG/1
15 CAPSULE ORAL DAILY
Qty: 30 CAPSULE | Refills: 0 | Status: SHIPPED | OUTPATIENT
Start: 2022-01-05 | End: 2022-04-28 | Stop reason: SDUPTHER

## 2022-01-05 RX ORDER — OMEPRAZOLE 40 MG/1
40 CAPSULE, DELAYED RELEASE ORAL
Qty: 90 CAPSULE | Refills: 0 | Status: SHIPPED | OUTPATIENT
Start: 2022-01-05 | End: 2022-08-07 | Stop reason: SDUPTHER

## 2022-01-05 NOTE — PROGRESS NOTES
ADULT ANNUAL [facility] INTERNAL MEDICINE    NAME: Juan Calabrese  AGE: 39 y o  SEX: female  : 1985     DATE: 2022     Assessment and Plan:     Problem List Items Addressed This Visit        Digestive    Gastroesophageal reflux disease without esophagitis    Relevant Medications    omeprazole (PriLOSEC) 40 MG capsule    omeprazole (PriLOSEC) 20 mg delayed release capsule    Other Relevant Orders    CBC and differential       Other    Anxiety    Annual physical exam    Mixed hyperlipidemia - Primary    Relevant Orders    Comprehensive metabolic panel    Lipid Panel with Direct LDL reflex    TSH, 3rd generation    Body mass index 28 0-28 9, adult    Relevant Medications    phentermine 15 MG capsule          Immunizations and preventive care screenings were discussed with patient today  Appropriate education was printed on patient's after visit summary  Counseling:  · Exercise: the importance of regular exercise/physical activity was discussed  Recommend exercise 3-5 times per week for at least 30 minutes  Depression Screening and Follow-up Plan: Patient was screened for depression during today's encounter  They screened negative with a PHQ-2 score of 0  Return in about 6 months (around 2022)  Chief Complaint:     Chief Complaint   Patient presents with    6 month f/u    Physical Exam      History of Present Illness:     Adult Annual Physical   Patient here for a comprehensive physical exam  The patient reports no problems  Diet and Physical Activity  · Diet/Nutrition: well balanced diet  · Exercise: moderate cardiovascular exercise  Depression Screening  PHQ-2/9 Depression Screening    Little interest or pleasure in doing things: 0 - not at all  Feeling down, depressed, or hopeless: 0 - not at all  PHQ-2 Score: 0  PHQ-2 Interpretation: Negative depression screen       General Health  · Sleep: sleeps well  · Hearing: normal - bilateral   · Vision: no vision problems  · Dental: regular dental visits  /GYN Health  · Last menstrual period: -  · Contraceptive method: IUD  · History of STDs?: no      Review of Systems:     Review of Systems   Constitutional: Negative for chills and fever  HENT: Negative for congestion, ear pain and sore throat  Eyes: Negative for pain  Respiratory: Negative for cough and shortness of breath  Cardiovascular: Negative for chest pain and leg swelling  Gastrointestinal: Negative for abdominal pain, nausea and vomiting  Endocrine: Negative for polyuria  Genitourinary: Negative for difficulty urinating, frequency and urgency  Musculoskeletal: Negative for arthralgias and back pain  Skin: Negative for rash  Neurological: Negative for weakness and headaches  Psychiatric/Behavioral: Negative for sleep disturbance  The patient is not nervous/anxious         Past Medical History:     Past Medical History:   Diagnosis Date    Abnormal Pap smear of cervix     Allergic rhinitis     Anxiety     Anxiety and depression     Chronic post-traumatic stress disorder (PTSD)     GERD (gastroesophageal reflux disease)     HPV (human papilloma virus) infection     Hx of colposcopy with cervical biopsy     Hyperlipidemia     Migraine       Past Surgical History:     Past Surgical History:   Procedure Laterality Date    COLPOSCOPY      with biopsy with endocervical currettage     CONTRACEPTIVE CAPSULE REMOVAL      Last assessed: 4/1/2014    MO HYSTEROSCOPY,W/ENDO BX N/A 1/6/2020    Procedure: DILATATION AND CURETTAGE (D&C) WITH HYSTEROSCOPY Polypectomy;  Surgeon: Kumar Romero MD;  Location: [facility];  Service: Gynecology    TONSILLECTOMY        Social History:     Social History     Socioeconomic History    Marital status: /Civil Union     Spouse name: None    Number of children: None    Years of education: None    Highest education level: None Occupational History    Occupation: Pharmacist    Tobacco Use    Smoking status: Never Smoker    Smokeless tobacco: Never Used   Vaping Use    Vaping Use: Never used   Substance and Sexual Activity    Alcohol use:  Yes     Alcohol/week: 1 0 standard drink     Types: 1 Glasses of wine per week     Comment: occ    Drug use: None     Comment: medical marijuana     Sexual activity: Yes     Partners: Male     Birth control/protection: OCP   Other Topics Concern    None   Social History Narrative    Home environment domestic violence - denied         Alcohol: Negative - As per [employer]      Social Determinants of Health     Financial Resource Strain: Not on file   Food Insecurity: Not on file   Transportation Needs: Not on file   Physical Activity: Not on file   Stress: Not on file   Social Connections: Not on file   Intimate Partner Violence: Not on file   Housing Stability: Not on file      Family History:     Family History   Problem Relation Age of Onset    Hyperlipidemia Mother     Heart disease Mother     Breast cancer Family     Diabetes Family     Heart disease Family     Colon cancer Maternal Uncle     Heart disease Father     Hyperlipidemia Father     Breast cancer Cousin     Diabetes type II Family     Hypertension Family       Current Medications:     Current Outpatient Medications   Medication Sig Dispense Refill    ALPRAZolam (XANAX) 0 25 mg tablet Take 1 tablet (0 25 mg total) by mouth daily at bedtime as needed for anxiety 30 tablet 0    ascorbic acid (VITAMIN C) 500 mg tablet Take 500 mg by mouth daily      ibuprofen (MOTRIN) 200 mg tablet Take 3 tablets (600 mg total) by mouth every 6 (six) hours as needed for cramping (Patient taking differently: Take 600 mg by mouth as needed for cramping )  0    levonorgestrel (KYLEENA) 19 5 MG intrauterine device 1 Intra Uterine Device by Intrauterine route once        Multiple Vitamin (MULTI-VITAMIN DAILY) TABS Take by mouth      omeprazole (PriLOSEC) 10 mg delayed release capsule TAKE 1 CAPSULE BY MOUTH EVERY DAY 90 capsule 0    omeprazole (PriLOSEC) 20 mg delayed release capsule Take 1 capsule (20 mg total) by mouth daily 90 capsule 1    phentermine 15 MG capsule Take 1 capsule (15 mg total) by mouth daily 30 capsule 0    Probiotic Product (PROBIOTIC-10) CAPS Take by mouth daily       omeprazole (PriLOSEC) 40 MG capsule Take 1 capsule (40 mg total) by mouth daily before breakfast 90 capsule 0     No current facility-administered medications for this visit  Allergies:     No Known Allergies   Physical Exam:     /68 (BP Location: Right arm, Patient Position: Sitting, Cuff Size: Standard)   Pulse 72   Temp 98 6 °F (37 °C) (Temporal)   Ht 5' 6" (1 676 m)   Wt 78 9 kg (174 lb)   SpO2 98%   BMI 28 08 kg/m²     Physical Exam  Vitals and nursing note reviewed  Constitutional:       General: She is not in acute distress  Appearance: She is well-developed  HENT:      Head: Normocephalic and atraumatic  Right Ear: Tympanic membrane, ear canal and external ear normal       Left Ear: Tympanic membrane, ear canal and external ear normal       Mouth/Throat:      Mouth: Mucous membranes are moist       Pharynx: Oropharynx is clear  Eyes:      Extraocular Movements: Extraocular movements intact  Conjunctiva/sclera: Conjunctivae normal       Pupils: Pupils are equal, round, and reactive to light  Cardiovascular:      Rate and Rhythm: Normal rate and regular rhythm  Heart sounds: Normal heart sounds  No murmur heard  Pulmonary:      Effort: Pulmonary effort is normal  No respiratory distress  Breath sounds: Normal breath sounds  Abdominal:      General: There is no distension  Palpations: Abdomen is soft  Tenderness: There is no abdominal tenderness  Musculoskeletal:         General: Normal range of motion  Cervical back: Normal range of motion and neck supple     Skin:     General: Skin is warm and dry  Neurological:      General: No focal deficit present  Mental Status: She is alert and oriented to person, place, and time     Psychiatric:         Mood and Affect: Mood normal          Behavior: Behavior normal           Jayesh Dill MD   [facility] INTERNAL MEDICINE

## 2022-01-05 NOTE — PROGRESS NOTES
Assessment/Plan:      Depression Screening and Follow-up Plan: Patient was screened for depression during today's encounter  They screened negative with a PHQ-2 score of 0             1  Mixed hyperlipidemia  -     Comprehensive metabolic panel; Future  -     Lipid Panel with Direct LDL reflex; Future  -     TSH, 3rd generation; Future    2  Annual physical exam    3  Gastroesophageal reflux disease without esophagitis  -     omeprazole (PriLOSEC) 40 MG capsule; Take 1 capsule (40 mg total) by mouth daily before breakfast  -     omeprazole (PriLOSEC) 20 mg delayed release capsule; Take 1 capsule (20 mg total) by mouth daily  -     CBC and differential; Future    4  Anxiety    5  Body mass index 28 0-28 9, adult  -     phentermine 15 MG capsule; Take 1 capsule (15 mg total) by mouth daily           Subjective:      Patient ID: Cathy Galeas is a 39 y o  female  Follow-up on multiple medical problems to ensure the stable on current medication, also need physical      The following portions of the patient's history were reviewed and updated as appropriate: She  has a past medical history of Abnormal Pap smear of cervix, Allergic rhinitis, Anxiety, Anxiety and depression, Chronic post-traumatic stress disorder (PTSD), GERD (gastroesophageal reflux disease), HPV (human papilloma virus) infection, colposcopy with cervical biopsy, Hyperlipidemia, and Migraine  She   Patient Active Problem List    Diagnosis Date Noted    Need for hepatitis C screening test 06/28/2021    Body mass index 28 0-28 9, adult 02/22/2021    Gastroesophageal reflux disease without esophagitis     Mixed hyperlipidemia     Annual physical exam 06/29/2020    S/P dilation and curettage 01/06/2020    Endometrial polyp 11/20/2019    Abnormal uterine bleeding 11/20/2019    Anxiety 04/05/2019    Premenstrual syndrome 03/24/2017     She  has a past surgical history that includes Colposcopy;  Contraceptive capsule removal; pr hysteroscopy,w/endo bx (N/A, 1/6/2020); and Tonsillectomy  Her family history includes Breast cancer in her cousin and family; Colon cancer in her maternal uncle; Diabetes in her family; Diabetes type II in her family; Heart disease in her family, father, and mother; Hyperlipidemia in her father and mother; Hypertension in her family  She  reports that she has never smoked  She has never used smokeless tobacco  She reports current alcohol use of about 1 0 standard drink of alcohol per week  No history on file for drug use  Current Outpatient Medications   Medication Sig Dispense Refill    ALPRAZolam (XANAX) 0 25 mg tablet Take 1 tablet (0 25 mg total) by mouth daily at bedtime as needed for anxiety 30 tablet 0    ascorbic acid (VITAMIN C) 500 mg tablet Take 500 mg by mouth daily      ibuprofen (MOTRIN) 200 mg tablet Take 3 tablets (600 mg total) by mouth every 6 (six) hours as needed for cramping (Patient taking differently: Take 600 mg by mouth as needed for cramping )  0    levonorgestrel (KYLEENA) 19 5 MG intrauterine device 1 Intra Uterine Device by Intrauterine route once        Multiple Vitamin (MULTI-VITAMIN DAILY) TABS Take by mouth      omeprazole (PriLOSEC) 10 mg delayed release capsule TAKE 1 CAPSULE BY MOUTH EVERY DAY 90 capsule 0    omeprazole (PriLOSEC) 20 mg delayed release capsule Take 1 capsule (20 mg total) by mouth daily 90 capsule 1    phentermine 15 MG capsule Take 1 capsule (15 mg total) by mouth daily 30 capsule 0    Probiotic Product (PROBIOTIC-10) CAPS Take by mouth daily       omeprazole (PriLOSEC) 40 MG capsule Take 1 capsule (40 mg total) by mouth daily before breakfast 90 capsule 0     No current facility-administered medications for this visit       Current Outpatient Medications on File Prior to Visit   Medication Sig    ALPRAZolam (XANAX) 0 25 mg tablet Take 1 tablet (0 25 mg total) by mouth daily at bedtime as needed for anxiety    ascorbic acid (VITAMIN C) 500 mg tablet Take 500 mg by mouth daily    ibuprofen (MOTRIN) 200 mg tablet Take 3 tablets (600 mg total) by mouth every 6 (six) hours as needed for cramping (Patient taking differently: Take 600 mg by mouth as needed for cramping )    levonorgestrel (KYLEENA) 19 5 MG intrauterine device 1 Intra Uterine Device by Intrauterine route once      Multiple Vitamin (MULTI-VITAMIN DAILY) TABS Take by mouth    omeprazole (PriLOSEC) 10 mg delayed release capsule TAKE 1 CAPSULE BY MOUTH EVERY DAY    Probiotic Product (PROBIOTIC-10) CAPS Take by mouth daily     [DISCONTINUED] omeprazole (PriLOSEC) 20 mg delayed release capsule Take 1 capsule (20 mg total) by mouth daily    [DISCONTINUED] phentermine 15 MG capsule Take 1 capsule (15 mg total) by mouth daily     No current facility-administered medications on file prior to visit  She has No Known Allergies       Review of Systems   Constitutional: Negative for chills and fever  HENT: Negative for congestion, ear pain and sore throat  Eyes: Negative for pain  Respiratory: Negative for cough and shortness of breath  Cardiovascular: Negative for chest pain and leg swelling  Gastrointestinal: Negative for abdominal pain, nausea and vomiting  Endocrine: Negative for polyuria  Genitourinary: Negative for difficulty urinating, frequency and urgency  Musculoskeletal: Negative for arthralgias and back pain  Skin: Negative for rash  Neurological: Negative for weakness and headaches  Psychiatric/Behavioral: Negative for sleep disturbance  The patient is not nervous/anxious  Objective:      /68 (BP Location: Right arm, Patient Position: Sitting, Cuff Size: Standard)   Pulse 72   Temp 98 6 °F (37 °C) (Temporal)   Ht 5' 6" (1 676 m)   Wt 78 9 kg (174 lb)   SpO2 98%   BMI 28 08 kg/m²     No results found for this or any previous visit (from the past 1344 hour(s))  Physical Exam  Constitutional:       Appearance: Normal appearance     HENT: Head: Normocephalic  Right Ear: Tympanic membrane, ear canal and external ear normal       Left Ear: Tympanic membrane, ear canal and external ear normal       Nose: Nose normal  No congestion  Mouth/Throat:      Mouth: Mucous membranes are moist       Pharynx: Oropharynx is clear  No oropharyngeal exudate or posterior oropharyngeal erythema  Eyes:      Extraocular Movements: Extraocular movements intact  Conjunctiva/sclera: Conjunctivae normal       Pupils: Pupils are equal, round, and reactive to light  Cardiovascular:      Rate and Rhythm: Normal rate and regular rhythm  Heart sounds: Normal heart sounds  No murmur heard  Pulmonary:      Effort: Pulmonary effort is normal       Breath sounds: Normal breath sounds  No wheezing or rales  Abdominal:      General: Bowel sounds are normal  There is no distension  Palpations: Abdomen is soft  Tenderness: There is no abdominal tenderness  Musculoskeletal:         General: Normal range of motion  Cervical back: Normal range of motion and neck supple  Right lower leg: No edema  Left lower leg: No edema  Lymphadenopathy:      Cervical: No cervical adenopathy  Skin:     General: Skin is warm  Neurological:      General: No focal deficit present  Mental Status: She is alert and oriented to person, place, and time

## 2022-01-05 NOTE — PATIENT INSTRUCTIONS

## 2022-04-28 RX ORDER — PHENTERMINE HYDROCHLORIDE 15 MG/1
15 CAPSULE ORAL DAILY
Qty: 30 CAPSULE | Refills: 0 | Status: SHIPPED | OUTPATIENT
Start: 2022-04-28 | End: 2022-06-07 | Stop reason: SDUPTHER

## 2022-05-26 ENCOUNTER — TELEPHONE (OUTPATIENT)
Dept: INTERNAL MEDICINE CLINIC | Facility: OTHER | Age: 37
End: 2022-05-26

## 2022-06-07 ENCOUNTER — OFFICE VISIT (OUTPATIENT)
Dept: OBGYN CLINIC | Facility: CLINIC | Age: 37
End: 2022-06-07
Payer: COMMERCIAL

## 2022-06-07 VITALS
SYSTOLIC BLOOD PRESSURE: 118 MMHG | BODY MASS INDEX: 27.68 KG/M2 | DIASTOLIC BLOOD PRESSURE: 74 MMHG | WEIGHT: 172.2 LBS | HEIGHT: 66 IN

## 2022-06-07 DIAGNOSIS — N92.6 IRREGULAR MENSTRUAL BLEEDING: Primary | ICD-10-CM

## 2022-06-07 PROBLEM — Z11.59 NEED FOR HEPATITIS C SCREENING TEST: Status: RESOLVED | Noted: 2021-06-28 | Resolved: 2022-06-07

## 2022-06-07 PROBLEM — Z00.00 ANNUAL PHYSICAL EXAM: Status: RESOLVED | Noted: 2020-06-29 | Resolved: 2022-06-07

## 2022-06-07 LAB
ALBUMIN SERPL-MCNC: 4 G/DL (ref 3.6–5.1)
ALBUMIN/GLOB SERPL: 1.4 (CALC) (ref 1–2.5)
ALP SERPL-CCNC: 67 U/L (ref 31–125)
ALT SERPL-CCNC: 15 U/L (ref 6–29)
AST SERPL-CCNC: 19 U/L (ref 10–30)
BASOPHILS # BLD AUTO: 126 CELLS/UL (ref 0–200)
BASOPHILS NFR BLD AUTO: 1.5 %
BILIRUB SERPL-MCNC: 0.6 MG/DL (ref 0.2–1.2)
BUN SERPL-MCNC: 11 MG/DL (ref 7–25)
BUN/CREAT SERPL: NORMAL (CALC) (ref 6–22)
CALCIUM SERPL-MCNC: 9.9 MG/DL (ref 8.6–10.2)
CHLORIDE SERPL-SCNC: 104 MMOL/L (ref 98–110)
CHOLEST SERPL-MCNC: 196 MG/DL
CHOLEST/HDLC SERPL: 4.1 (CALC)
CO2 SERPL-SCNC: 28 MMOL/L (ref 20–32)
CREAT SERPL-MCNC: 0.71 MG/DL (ref 0.5–1.1)
EOSINOPHIL # BLD AUTO: 302 CELLS/UL (ref 15–500)
EOSINOPHIL NFR BLD AUTO: 3.6 %
ERYTHROCYTE [DISTWIDTH] IN BLOOD BY AUTOMATED COUNT: 13.1 % (ref 11–15)
GLOBULIN SER CALC-MCNC: 2.8 G/DL (CALC) (ref 1.9–3.7)
GLUCOSE SERPL-MCNC: 95 MG/DL (ref 65–99)
HCT VFR BLD AUTO: 37.7 % (ref 35–45)
HDLC SERPL-MCNC: 48 MG/DL
HGB BLD-MCNC: 12.5 G/DL (ref 11.7–15.5)
LDLC SERPL CALC-MCNC: 129 MG/DL (CALC)
LYMPHOCYTES # BLD AUTO: 2570 CELLS/UL (ref 850–3900)
LYMPHOCYTES NFR BLD AUTO: 30.6 %
MCH RBC QN AUTO: 27.4 PG (ref 27–33)
MCHC RBC AUTO-ENTMCNC: 33.2 G/DL (ref 32–36)
MCV RBC AUTO: 82.5 FL (ref 80–100)
MONOCYTES # BLD AUTO: 748 CELLS/UL (ref 200–950)
MONOCYTES NFR BLD AUTO: 8.9 %
NEUTROPHILS # BLD AUTO: 4654 CELLS/UL (ref 1500–7800)
NEUTROPHILS NFR BLD AUTO: 55.4 %
NONHDLC SERPL-MCNC: 148 MG/DL (CALC)
PLATELET # BLD AUTO: 409 THOUSAND/UL (ref 140–400)
PMV BLD REES-ECKER: 10 FL (ref 7.5–12.5)
POTASSIUM SERPL-SCNC: 4.8 MMOL/L (ref 3.5–5.3)
PROT SERPL-MCNC: 6.8 G/DL (ref 6.1–8.1)
RBC # BLD AUTO: 4.57 MILLION/UL (ref 3.8–5.1)
SL AMB EGFR AFRICAN AMERICAN: 126 ML/MIN/1.73M2
SL AMB EGFR NON AFRICAN AMERICAN: 109 ML/MIN/1.73M2
SODIUM SERPL-SCNC: 137 MMOL/L (ref 135–146)
TRIGL SERPL-MCNC: 91 MG/DL
TSH SERPL-ACNC: 0.79 MIU/L
WBC # BLD AUTO: 8.4 THOUSAND/UL (ref 3.8–10.8)

## 2022-06-07 PROCEDURE — 99213 OFFICE O/P EST LOW 20 MIN: CPT | Performed by: PHYSICIAN ASSISTANT

## 2022-06-07 PROCEDURE — 1036F TOBACCO NON-USER: CPT | Performed by: PHYSICIAN ASSISTANT

## 2022-06-07 NOTE — PROGRESS NOTES
Assessment/Plan:    No problem-specific Assessment & Plan notes found for this encounter  Diagnoses and all orders for this visit:    Irregular menstrual bleeding        Discussed IUD with patient  Bleeding pattern may or may not improve  Other symptoms could be related to IUD  Offered further hormonal contraception vs endometrial ablation  Patient would like ablation  Will see Dr Kerry Orr for consult; will leave IUD in place until ablation is performed  F/u in 2-3 weeks for yearly gyn exam   Call with problems in the interim  Subjective:      Patient ID: Gerry Cochran is a 39 y o  female  Patient is here for IUD issue  Had [place] placed February 2021  States periods are still not well controlled  Has a period every 3-4 weeks with spotting in between  Bleeding lasts for 4 days  Complains of continued dysmenorrhea, as well as increasing mood changes  Also experiencing b/l breast tenderness  She denies bowel/bladder changes, pelvic pain, bloating, abdominal pain, n/v, and appetite changes  Has tried multiple OCPs in the past with no success  Does not desire childbearing  Overdue for annual gym exam       The following portions of the patient's history were reviewed and updated as appropriate: allergies, current medications, past family history, past medical history, past social history, past surgical history and problem list     Review of Systems   Constitutional: Negative for appetite change and unexpected weight change  Gastrointestinal: Negative for abdominal distention, abdominal pain, constipation, diarrhea, nausea and vomiting  Genitourinary: Positive for menstrual problem (Irregular periods; breakthrough bleeding; dysmenorrhea)  Negative for difficulty urinating, dysuria, frequency, genital sores, hematuria, pelvic pain, urgency, vaginal bleeding, vaginal discharge and vaginal pain     Psychiatric/Behavioral:        Mood swings         Objective:      /74 (BP Location: Left arm, Patient Position: Sitting, Cuff Size: Standard)   Ht 5' 6" (1 676 m)   Wt 78 1 kg (172 lb 3 2 oz)   LMP 06/05/2022   BMI 27 79 kg/m²          Physical Exam  Vitals reviewed  Constitutional:       Appearance: Normal appearance  She is well-developed  Neurological:      Mental Status: She is alert and oriented to person, place, and time  Psychiatric:         Mood and Affect: Mood normal          Behavior: Behavior normal  Behavior is cooperative  Thought Content:  Thought content normal          Judgment: Judgment normal

## 2022-06-08 RX ORDER — PHENTERMINE HYDROCHLORIDE 15 MG/1
15 CAPSULE ORAL DAILY
Qty: 30 CAPSULE | Refills: 0 | Status: SHIPPED | OUTPATIENT
Start: 2022-06-08 | End: 2022-07-06 | Stop reason: SDUPTHER

## 2022-06-28 ENCOUNTER — ANNUAL EXAM (OUTPATIENT)
Dept: OBGYN CLINIC | Facility: CLINIC | Age: 37
End: 2022-06-28
Payer: COMMERCIAL

## 2022-06-28 VITALS
SYSTOLIC BLOOD PRESSURE: 118 MMHG | BODY MASS INDEX: 27.8 KG/M2 | HEIGHT: 66 IN | DIASTOLIC BLOOD PRESSURE: 80 MMHG | WEIGHT: 173 LBS

## 2022-06-28 DIAGNOSIS — Z01.411 ENCOUNTER FOR GYNECOLOGICAL EXAMINATION WITH ABNORMAL FINDING: Primary | ICD-10-CM

## 2022-06-28 DIAGNOSIS — N64.4 BREAST PAIN IN FEMALE: ICD-10-CM

## 2022-06-28 PROCEDURE — 3008F BODY MASS INDEX DOCD: CPT | Performed by: PHYSICIAN ASSISTANT

## 2022-06-28 PROCEDURE — G0145 SCR C/V CYTO,THINLAYER,RESCR: HCPCS | Performed by: PHYSICIAN ASSISTANT

## 2022-06-28 PROCEDURE — G0476 HPV COMBO ASSAY CA SCREEN: HCPCS | Performed by: PHYSICIAN ASSISTANT

## 2022-06-28 PROCEDURE — S0612 ANNUAL GYNECOLOGICAL EXAMINA: HCPCS | Performed by: PHYSICIAN ASSISTANT

## 2022-06-28 NOTE — PROGRESS NOTES
Assessment/Plan:    No problem-specific Assessment & Plan notes found for this encounter  Diagnoses and all orders for this visit:    Encounter for gynecological examination with abnormal finding  -     Liquid-based pap, screening    Breast pain in female  -     Mammo diagnostic bilateral w 3d & cad; Future        Pap done  Order for diagnostic mammogram entered to evaluate breast pain  F/u for ablation consult as planned  Call with problems in the interim  Subjective:      Patient ID: Leyla Ruth is a 39 y o  female  Patient is here for yearly gyn exam   States she is doing well overall  No changes in bleeding pattern since visit on 6/7  Has Katha Juice in place, but continues to get irregular bleeding  Has appointment for ablation consult on 7/12  She denies bowel/bladder changes, pelvic pain, bloating, abdominal pain, n/v, change in appetite, and thyroid disease  Patient complains of b/l breast pain since April  Denies new masses, skin changes, and nipple discharge  The following portions of the patient's history were reviewed and updated as appropriate: allergies, current medications, past family history, past medical history, past social history, past surgical history and problem list     Review of Systems   Constitutional: Negative for appetite change and unexpected weight change  Cardiovascular:        No masses, skin changes, nipple discharge, and pain/tenderness  Gastrointestinal: Negative for abdominal distention, abdominal pain, constipation, diarrhea, nausea and vomiting  Genitourinary: Positive for menstrual problem (Irregular menstrual bleeding)  Negative for difficulty urinating, dysuria, frequency, genital sores, hematuria, pelvic pain, urgency, vaginal bleeding, vaginal discharge and vaginal pain  Objective:      /80   Ht 5' 6" (1 676 m)   Wt 78 5 kg (173 lb)   LMP 06/05/2022   BMI 27 92 kg/m²          Physical Exam  Vitals reviewed   Exam conducted with a chaperone present  Constitutional:       Appearance: Normal appearance  She is well-developed  Neck:      Thyroid: No thyromegaly  Pulmonary:      Effort: Pulmonary effort is normal    Chest:   Breasts: Breasts are symmetrical       Right: Normal  No swelling, bleeding, inverted nipple, mass, nipple discharge, skin change, tenderness, axillary adenopathy or supraclavicular adenopathy  Left: Normal  No swelling, bleeding, inverted nipple, mass, nipple discharge, skin change, tenderness, axillary adenopathy or supraclavicular adenopathy  Abdominal:      General: Abdomen is flat  There is no distension  Palpations: Abdomen is soft  Tenderness: There is no abdominal tenderness  Genitourinary:     General: Normal vulva  Pubic Area: No rash  Labia:         Right: No rash, tenderness, lesion or injury  Left: No rash, tenderness, lesion or injury  Vagina: Normal  No vaginal discharge, erythema, tenderness or bleeding  Cervix: Normal       Uterus: Normal        Adnexa: Right adnexa normal and left adnexa normal         Right: No mass, tenderness or fullness  Left: No mass, tenderness or fullness  Comments: IUD strings visualized  Musculoskeletal:      Cervical back: Neck supple  Lymphadenopathy:      Cervical: No cervical adenopathy  Upper Body:      Right upper body: No supraclavicular or axillary adenopathy  Left upper body: No supraclavicular or axillary adenopathy  Lower Body: No right inguinal adenopathy  No left inguinal adenopathy  Skin:     General: Skin is warm and dry  Neurological:      Mental Status: She is alert and oriented to person, place, and time  Psychiatric:         Mood and Affect: Mood normal          Behavior: Behavior normal  Behavior is cooperative  Thought Content:  Thought content normal          Judgment: Judgment normal

## 2022-07-06 ENCOUNTER — OFFICE VISIT (OUTPATIENT)
Dept: INTERNAL MEDICINE CLINIC | Facility: CLINIC | Age: 37
End: 2022-07-06
Payer: COMMERCIAL

## 2022-07-06 VITALS
WEIGHT: 172 LBS | HEIGHT: 66 IN | TEMPERATURE: 99.6 F | OXYGEN SATURATION: 98 % | HEART RATE: 74 BPM | BODY MASS INDEX: 27.64 KG/M2 | DIASTOLIC BLOOD PRESSURE: 72 MMHG | SYSTOLIC BLOOD PRESSURE: 128 MMHG

## 2022-07-06 DIAGNOSIS — E78.2 MIXED HYPERLIPIDEMIA: ICD-10-CM

## 2022-07-06 DIAGNOSIS — K21.9 GASTROESOPHAGEAL REFLUX DISEASE WITHOUT ESOPHAGITIS: Primary | ICD-10-CM

## 2022-07-06 LAB
LAB AP GYN PRIMARY INTERPRETATION: NORMAL
LAB AP LMP: NORMAL
Lab: NORMAL

## 2022-07-06 PROCEDURE — 99214 OFFICE O/P EST MOD 30 MIN: CPT | Performed by: INTERNAL MEDICINE

## 2022-07-06 PROCEDURE — 3725F SCREEN DEPRESSION PERFORMED: CPT | Performed by: INTERNAL MEDICINE

## 2022-07-06 RX ORDER — PHENTERMINE HYDROCHLORIDE 15 MG/1
15 CAPSULE ORAL DAILY
Qty: 30 CAPSULE | Refills: 0 | Status: SHIPPED | OUTPATIENT
Start: 2022-07-06 | End: 2022-09-02 | Stop reason: SDUPTHER

## 2022-07-06 NOTE — PROGRESS NOTES
Assessment/Plan:      Depression Screening and Follow-up Plan: Patient was screened for depression during today's encounter  They screened negative with a PHQ-2 score of 0             1  Gastroesophageal reflux disease without esophagitis    2  Mixed hyperlipidemia    3  Body mass index 27 0-27 9, adult    4  Body mass index 28 0-28 9, adult  -     phentermine 15 MG capsule; Take 1 capsule (15 mg total) by mouth daily         Subjective:      Patient ID: Nandini Moralez is a 39 y o  female  Follow-up on blood test done on 06/07/2022 test discussed with her      The following portions of the patient's history were reviewed and updated as appropriate: She  has a past medical history of Abnormal Pap smear of cervix, Allergic rhinitis, Anxiety, Anxiety and depression, Chronic post-traumatic stress disorder (PTSD), GERD (gastroesophageal reflux disease), HPV (human papilloma virus) infection, colposcopy with cervical biopsy, Hyperlipidemia, and Migraine  She   Patient Active Problem List    Diagnosis Date Noted    Body mass index 27 0-27 9, adult 02/22/2021    Gastroesophageal reflux disease without esophagitis     Mixed hyperlipidemia     S/P dilation and curettage 01/06/2020    Endometrial polyp 11/20/2019    Abnormal uterine bleeding 11/20/2019    Anxiety 04/05/2019    Premenstrual syndrome 03/24/2017     She  has a past surgical history that includes Colposcopy; Contraceptive capsule removal; pr hysteroscopy,w/endo bx (N/A, 1/6/2020); and Tonsillectomy  Her family history includes Breast cancer in her cousin and family; Colon cancer in her maternal uncle; Diabetes in her family; Diabetes type II in her family; Heart disease in her family, father, and mother; Hyperlipidemia in her father and mother; Hypertension in her family  She  reports that she has never smoked  She has never used smokeless tobacco  She reports current alcohol use of about 1 0 standard drink of alcohol per week   She reports current drug use  Current Outpatient Medications   Medication Sig Dispense Refill    ALPRAZolam (XANAX) 0 25 mg tablet Take 1 tablet (0 25 mg total) by mouth daily at bedtime as needed for anxiety 30 tablet 0    ascorbic acid (VITAMIN C) 500 mg tablet Take 500 mg by mouth daily      ibuprofen (MOTRIN) 200 mg tablet Take 3 tablets (600 mg total) by mouth every 6 (six) hours as needed for cramping (Patient taking differently: Take 600 mg by mouth as needed for cramping)  0    levonorgestrel (KYLEENA) 19 5 MG intrauterine device 1 Intra Uterine Device by Intrauterine route once        Multiple Vitamin (MULTI-VITAMIN DAILY) TABS Take by mouth      omeprazole (PriLOSEC) 20 mg delayed release capsule Take 1 capsule (20 mg total) by mouth daily 90 capsule 1    omeprazole (PriLOSEC) 40 MG capsule Take 1 capsule (40 mg total) by mouth daily before breakfast 90 capsule 0    phentermine 15 MG capsule Take 1 capsule (15 mg total) by mouth daily 30 capsule 0    Probiotic Product (PROBIOTIC-10) CAPS Take by mouth daily       omeprazole (PriLOSEC) 10 mg delayed release capsule TAKE 1 CAPSULE BY MOUTH EVERY DAY 90 capsule 0     No current facility-administered medications for this visit       Current Outpatient Medications on File Prior to Visit   Medication Sig    ALPRAZolam (XANAX) 0 25 mg tablet Take 1 tablet (0 25 mg total) by mouth daily at bedtime as needed for anxiety    ascorbic acid (VITAMIN C) 500 mg tablet Take 500 mg by mouth daily    ibuprofen (MOTRIN) 200 mg tablet Take 3 tablets (600 mg total) by mouth every 6 (six) hours as needed for cramping (Patient taking differently: Take 600 mg by mouth as needed for cramping)    levonorgestrel (KYLEENA) 19 5 MG intrauterine device 1 Intra Uterine Device by Intrauterine route once      Multiple Vitamin (MULTI-VITAMIN DAILY) TABS Take by mouth    omeprazole (PriLOSEC) 20 mg delayed release capsule Take 1 capsule (20 mg total) by mouth daily    omeprazole (PriLOSEC) 40 MG capsule Take 1 capsule (40 mg total) by mouth daily before breakfast    Probiotic Product (PROBIOTIC-10) CAPS Take by mouth daily     [DISCONTINUED] phentermine 15 MG capsule Take 1 capsule (15 mg total) by mouth daily    omeprazole (PriLOSEC) 10 mg delayed release capsule TAKE 1 CAPSULE BY MOUTH EVERY DAY     No current facility-administered medications on file prior to visit  She has No Known Allergies       Review of Systems   Constitutional: Negative for chills and fever  HENT: Negative for congestion, ear pain and sore throat  Eyes: Negative for pain  Respiratory: Negative for cough and shortness of breath  Cardiovascular: Negative for chest pain and leg swelling  Gastrointestinal: Negative for abdominal pain, nausea and vomiting  Endocrine: Negative for polyuria  Genitourinary: Negative for difficulty urinating, frequency and urgency  Musculoskeletal: Negative for arthralgias and back pain  Skin: Negative for rash  Neurological: Negative for weakness and headaches  Psychiatric/Behavioral: Negative for sleep disturbance  The patient is not nervous/anxious            Objective:      /72 (BP Location: Right arm, Patient Position: Sitting, Cuff Size: Standard)   Pulse 74   Temp 99 6 °F (37 6 °C) (Temporal)   Ht 5' 6" (1 676 m)   Wt 78 kg (172 lb)   SpO2 98%   BMI 27 76 kg/m²     Recent Results (from the past 1344 hour(s))   Lipid Panel with Direct LDL reflex    Collection Time: 06/07/22  8:13 AM   Result Value Ref Range    Total Cholesterol 196 <200 mg/dL    HDL 48 (L) > OR = 50 mg/dL    Triglycerides 91 <150 mg/dL    LDL Calculated 129 (H) mg/dL (calc)    Chol HDLC Ratio 4 1 <5 0 (calc)    Non-HDL Cholesterol 148 (H) <130 mg/dL (calc)   Comprehensive metabolic panel    Collection Time: 06/07/22  8:13 AM   Result Value Ref Range    Glucose, Random 95 65 - 99 mg/dL    BUN 11 7 - 25 mg/dL    Creatinine 0 71 0 50 - 1 10 mg/dL    eGFR Non  109 > OR = 60 mL/min/1 73m2    eGFR  126 > OR = 60 mL/min/1 73m2    SL AMB BUN/CREATININE RATIO NOT APPLICABLE 6 - 22 (calc)    Sodium 137 135 - 146 mmol/L    Potassium 4 8 3 5 - 5 3 mmol/L    Chloride 104 98 - 110 mmol/L    CO2 28 20 - 32 mmol/L    Calcium 9 9 8 6 - 10 2 mg/dL    Protein, Total 6 8 6 1 - 8 1 g/dL    Albumin 4 0 3 6 - 5 1 g/dL    Globulin 2 8 1 9 - 3 7 g/dL (calc)    Albumin/Globulin Ratio 1 4 1 0 - 2 5 (calc)    TOTAL BILIRUBIN 0 6 0 2 - 1 2 mg/dL    Alkaline Phosphatase 67 31 - 125 U/L    AST 19 10 - 30 U/L    ALT 15 6 - 29 U/L   CBC and differential    Collection Time: 06/07/22  8:13 AM   Result Value Ref Range    White Blood Cell Count 8 4 3 8 - 10 8 Thousand/uL    Red Blood Cell Count 4 57 3 80 - 5 10 Million/uL    Hemoglobin 12 5 11 7 - 15 5 g/dL    HCT 37 7 35 0 - 45 0 %    MCV 82 5 80 0 - 100 0 fL    MCH 27 4 27 0 - 33 0 pg    MCHC 33 2 32 0 - 36 0 g/dL    RDW 13 1 11 0 - 15 0 %    Platelet Count 524 (H) 140 - 400 Thousand/uL    SL AMB MPV 10 0 7 5 - 12 5 fL    Neutrophils (Absolute) 4,654 1,500 - 7,800 cells/uL    Lymphocytes (Absolute) 2,570 850 - 3,900 cells/uL    Monocytes (Absolute) 748 200 - 950 cells/uL    Eosinophils (Absolute) 302 15 - 500 cells/uL    Basophils  0 - 200 cells/uL    Neutrophils 55 4 %    Lymphocytes 30 6 %    Monocytes 8 9 %    Eosinophils 3 6 %    Basophils PCT 1 5 %   TSH, 3rd generation    Collection Time: 06/07/22  8:13 AM   Result Value Ref Range    TSH 0 79 mIU/L   HPV High Risk    Collection Time: 06/28/22  7:54 AM    Specimen: Cervix; Thin-Prep Vial   Result Value Ref Range    HPV Other HR Negative Negative    HPV16 Negative Negative    HPV18 Negative Negative        Physical Exam  Constitutional:       Appearance: Normal appearance  HENT:      Head: Normocephalic  Right Ear: Tympanic membrane, ear canal and external ear normal       Left Ear: Tympanic membrane, ear canal and external ear normal       Nose: Nose normal  No congestion  Mouth/Throat:      Mouth: Mucous membranes are moist       Pharynx: Oropharynx is clear  No oropharyngeal exudate or posterior oropharyngeal erythema  Eyes:      Extraocular Movements: Extraocular movements intact  Conjunctiva/sclera: Conjunctivae normal       Pupils: Pupils are equal, round, and reactive to light  Cardiovascular:      Rate and Rhythm: Normal rate and regular rhythm  Heart sounds: Normal heart sounds  No murmur heard  Pulmonary:      Effort: Pulmonary effort is normal       Breath sounds: Normal breath sounds  No wheezing or rales  Abdominal:      General: Bowel sounds are normal  There is no distension  Palpations: Abdomen is soft  Tenderness: There is no abdominal tenderness  Musculoskeletal:         General: Normal range of motion  Cervical back: Normal range of motion and neck supple  Right lower leg: No edema  Left lower leg: No edema  Lymphadenopathy:      Cervical: No cervical adenopathy  Skin:     General: Skin is warm  Neurological:      General: No focal deficit present  Mental Status: She is alert and oriented to person, place, and time

## 2022-07-12 ENCOUNTER — CONSULT (OUTPATIENT)
Dept: OBGYN CLINIC | Facility: CLINIC | Age: 37
End: 2022-07-12
Payer: COMMERCIAL

## 2022-07-12 VITALS
WEIGHT: 175 LBS | SYSTOLIC BLOOD PRESSURE: 122 MMHG | HEIGHT: 66 IN | DIASTOLIC BLOOD PRESSURE: 76 MMHG | BODY MASS INDEX: 28.12 KG/M2

## 2022-07-12 DIAGNOSIS — Z97.5 BREAKTHROUGH BLEEDING ASSOCIATED WITH INTRAUTERINE DEVICE (IUD): ICD-10-CM

## 2022-07-12 DIAGNOSIS — N92.0 MENORRHAGIA WITH REGULAR CYCLE: Primary | ICD-10-CM

## 2022-07-12 DIAGNOSIS — N92.1 BREAKTHROUGH BLEEDING ASSOCIATED WITH INTRAUTERINE DEVICE (IUD): ICD-10-CM

## 2022-07-12 DIAGNOSIS — D21.9 FIBROIDS: ICD-10-CM

## 2022-07-12 PROCEDURE — 99242 OFF/OP CONSLTJ NEW/EST SF 20: CPT | Performed by: OBSTETRICS & GYNECOLOGY

## 2022-07-13 ENCOUNTER — PREP FOR PROCEDURE (OUTPATIENT)
Dept: OBGYN CLINIC | Facility: CLINIC | Age: 37
End: 2022-07-13

## 2022-07-13 ENCOUNTER — TELEPHONE (OUTPATIENT)
Dept: OBGYN CLINIC | Facility: CLINIC | Age: 37
End: 2022-07-13

## 2022-07-13 DIAGNOSIS — N92.0 EXCESSIVE OR FREQUENT MENSTRUATION: Primary | ICD-10-CM

## 2022-07-19 ENCOUNTER — HOSPITAL ENCOUNTER (OUTPATIENT)
Dept: RADIOLOGY | Age: 37
Discharge: HOME | End: 2022-07-19
Payer: COMMERCIAL

## 2022-07-19 DIAGNOSIS — D21.9 FIBROIDS: ICD-10-CM

## 2022-07-19 PROCEDURE — 76830 TRANSVAGINAL US NON-OB: CPT

## 2022-07-19 PROCEDURE — 76856 US EXAM PELVIC COMPLETE: CPT

## 2022-07-29 ENCOUNTER — HOSPITAL ENCOUNTER (OUTPATIENT)
Dept: ULTRASOUND IMAGING | Facility: CLINIC | Age: 37
Discharge: HOME | End: 2022-07-29
Payer: COMMERCIAL

## 2022-07-29 ENCOUNTER — TELEPHONE (OUTPATIENT)
Dept: OBGYN CLINIC | Facility: CLINIC | Age: 37
End: 2022-07-29

## 2022-07-29 ENCOUNTER — HOSPITAL ENCOUNTER (OUTPATIENT)
Dept: MAMMOGRAPHY | Facility: CLINIC | Age: 37
Discharge: HOME | End: 2022-07-29
Payer: COMMERCIAL

## 2022-07-29 VITALS — BODY MASS INDEX: 28.12 KG/M2 | WEIGHT: 175 LBS | HEIGHT: 66 IN

## 2022-07-29 DIAGNOSIS — N60.09 CYST OF BREAST, UNSPECIFIED LATERALITY: Primary | ICD-10-CM

## 2022-07-29 DIAGNOSIS — N64.4 BREAST PAIN: ICD-10-CM

## 2022-07-29 DIAGNOSIS — N64.4 BREAST PAIN IN FEMALE: ICD-10-CM

## 2022-07-29 PROCEDURE — G0279 TOMOSYNTHESIS, MAMMO: HCPCS

## 2022-07-29 PROCEDURE — 76642 ULTRASOUND BREAST LIMITED: CPT

## 2022-07-29 PROCEDURE — 77066 DX MAMMO INCL CAD BI: CPT

## 2022-07-29 NOTE — TELEPHONE ENCOUNTER
Spoke with patient regarding breast imaging results  Multiple benign cysts seen in both breasts  Next mammogram at age 35  Patient states areas are still painful  Recommended consult with breast specialist   Referral entered; patient to call for appointment  Call with further questions

## 2022-08-01 ENCOUNTER — TELEPHONE (OUTPATIENT)
Dept: HEMATOLOGY ONCOLOGY | Facility: CLINIC | Age: 37
End: 2022-08-01

## 2022-08-02 ENCOUNTER — ANESTHESIA EVENT (OUTPATIENT)
Dept: PERIOP | Facility: HOSPITAL | Age: 37
End: 2022-08-02
Payer: COMMERCIAL

## 2022-08-03 NOTE — PRE-PROCEDURE INSTRUCTIONS
Pre-Surgery Instructions:   Medication Instructions    ALPRAZolam (XANAX) 0 25 mg tablet Uses PRN- OK to take day of surgery    ascorbic acid (VITAMIN C) 500 mg tablet Stop taking 7 days prior to surgery   ibuprofen (MOTRIN) 200 mg tablet Stop taking 3 days prior to surgery   Multiple Vitamin (MULTI-VITAMIN DAILY) TABS Stop taking 7 days prior to surgery   omeprazole (PriLOSEC) 40 MG capsule Take day of surgery   phentermine 15 MG capsule Hold day of surgery   Probiotic Product (PROBIOTIC-10) CAPS Stop taking 7 days prior to surgery  Have you had / have a sore throat? No  Have you had / have a cough less than 1 week? No  Have you had / have a fever greater than 100 0 - 100  4? No  Are you experiencing any shortness of breath? No    Review with patient via phone medications and showering instructions  Instructed to avoid all ASA and OTC Vit/Supp 1 week prior to surgery and to avoid NSAIDs 3 days prior to surgery per anesthesia instructions  Tylenol ok to take prn  [facility] call with surgery schedule time, nothing eat or drink after midnight  Verbalized understanding

## 2022-08-04 ENCOUNTER — TELEPHONE (OUTPATIENT)
Dept: HEMATOLOGY ONCOLOGY | Facility: CLINIC | Age: 37
End: 2022-08-04

## 2022-08-04 ENCOUNTER — PROCEDURE VISIT (OUTPATIENT)
Dept: OBGYN CLINIC | Facility: CLINIC | Age: 37
End: 2022-08-04
Payer: COMMERCIAL

## 2022-08-04 VITALS
WEIGHT: 173 LBS | DIASTOLIC BLOOD PRESSURE: 68 MMHG | HEIGHT: 66 IN | BODY MASS INDEX: 27.8 KG/M2 | SYSTOLIC BLOOD PRESSURE: 114 MMHG

## 2022-08-04 DIAGNOSIS — N92.0 MENORRHAGIA WITH REGULAR CYCLE: Primary | ICD-10-CM

## 2022-08-04 PROCEDURE — 88305 TISSUE EXAM BY PATHOLOGIST: CPT | Performed by: PATHOLOGY

## 2022-08-04 PROCEDURE — 58100 BIOPSY OF UTERUS LINING: CPT | Performed by: OBSTETRICS & GYNECOLOGY

## 2022-08-04 NOTE — PROGRESS NOTES
Assessment/Plan:  Endometrial biopsy done, see procedure note  Will send urgent since patient is scheduled for ablation in 7 days  Consent was obtained for D and C, NovaSure ablation, hysteroscopy, removal of IUD  Risks and benefits reviewed including failure rate, injury to adjacent structures requiring further surgery  We discussed sterilization, not interested,  will be getting vasectomy  All questions answered  Reviewed recent labs, no evidence of anemia  She will schedule postop visit 2-3 weeks  All questions answered  No problem-specific Assessment & Plan notes found for this encounter  Diagnoses and all orders for this visit:    Menorrhagia with regular cycle  -     Tissue Exam    Other orders  -     Endometrial biopsy          Subjective:      Patient ID: Herb Mosher is a 44 y o  female  HPI     This is a very pleasant 50-year-old female  ([facility] x1) accompanied with her mother today for endometrial biopsy  She is scheduled for NovaSure ablation next week  Prior to her IUD Derek Dailey placed 2021) her cycles are every 4 weeks lasting 7 days described as very heavy for 3-4 days  Since IUDs been inserted, now she has breakthrough bleeding as well as her heavy menstrual cycles  She has been in a monogamous relationship for over 12 years  They are not interested in pregnancy  She has been on OCPs in the past including Loestrin, Keshia/Guillermina  At this point she prefers to be off all hormones  She underwent a pelvic ultrasound which had revealed stable 1 9 cm posterior fibroid likely submucosal component, normal uterus, ovaries x2  The following portions of the patient's history were reviewed and updated as appropriate: allergies, current medications, past family history, past medical history, past social history, past surgical history and problem list     Review of Systems   Constitutional: Negative for fatigue, fever and unexpected weight change     Respiratory: Negative for cough, chest tightness, shortness of breath and wheezing  Cardiovascular: Negative  Negative for chest pain and palpitations  Gastrointestinal: Negative  Negative for abdominal distention, abdominal pain, blood in stool, constipation, diarrhea, nausea and vomiting  Genitourinary: Negative  Negative for difficulty urinating, dyspareunia, dysuria, flank pain, frequency, genital sores, hematuria, pelvic pain, urgency, vaginal bleeding, vaginal discharge and vaginal pain  Skin: Negative for rash  Objective:      /68   Ht 5' 6" (1 676 m)   Wt 78 5 kg (173 lb)   LMP 07/25/2022   BMI 27 92 kg/m²            Physical Exam  Constitutional:       Appearance: Normal appearance  HENT:      Head: Normocephalic and atraumatic  Cardiovascular:      Rate and Rhythm: Normal rate and regular rhythm  Pulmonary:      Effort: Pulmonary effort is normal       Breath sounds: Normal breath sounds  Abdominal:      General: Bowel sounds are normal  There is no distension  Palpations: Abdomen is soft  Tenderness: There is no abdominal tenderness  There is no guarding or rebound  Genitourinary:     Labia:         Right: No rash, tenderness or lesion  Left: No rash, tenderness or lesion  Vagina: No signs of injury  No vaginal discharge or tenderness  Cervix: No cervical motion tenderness, discharge, friability, lesion, erythema or cervical bleeding  Uterus: Not enlarged and not tender  Adnexa:         Right: No mass, tenderness or fullness  Left: No mass, tenderness or fullness  Neurological:      Mental Status: She is alert and oriented to person, place, and time  Psychiatric:         Behavior: Behavior normal        extremities:  Present x4    Endometrial biopsy    Date/Time: 8/4/2022 8:23 AM  Performed by: Bernardino Webber DO  Authorized by: Bernardino Webber DO   [place] Protocol:  Consent: Verbal consent obtained    Risks and benefits: risks, benefits and alternatives were discussed  Consent given by: patient  Patient understanding: patient states understanding of the procedure being performed  Patient consent: the patient's understanding of the procedure matches consent given  Patient identity confirmed: verbally with patient      Indication:     Indications: Other disorder of menstruation and other abnormal bleeding from female genital tract    Pre-procedure:     Premeds:  Ibuprofen  Procedure:     Procedure: endometrial biopsy with Pipelle      A bivalve speculum was placed in the vagina: yes      Cervix cleaned and prepped: yes      The cervix was dilated: yes      Uterus sound depth (cm):  9    Specimen collected: specimen collected and sent to pathology    Findings:     Uterus size:  9-10 weeks    Cervix: normal      Adnexa: normal    Comments:     Procedure comments:  Cervix was cleansed with Betadine solution  IUD string was visualized today  Cervix was grasped with the Allis clamp  Cervical os was dilated without difficulty  Endometrial Pipelle was then inserted  Uterus sounded to 8 cm   2 passes were obtained with ample amount of tissue  All instruments removed from the vagina  Patient tolerated the procedure well

## 2022-08-04 NOTE — TELEPHONE ENCOUNTER
Made attempt to schedule a new patient appointment,  Patient stating she will call back to schedule an appointment after she has a procedure

## 2022-08-07 DIAGNOSIS — K21.9 GASTROESOPHAGEAL REFLUX DISEASE WITHOUT ESOPHAGITIS: ICD-10-CM

## 2022-08-09 RX ORDER — OMEPRAZOLE 40 MG/1
40 CAPSULE, DELAYED RELEASE ORAL
Qty: 90 CAPSULE | Refills: 0 | Status: SHIPPED | OUTPATIENT
Start: 2022-08-09

## 2022-08-10 ENCOUNTER — ANESTHESIA EVENT (OUTPATIENT)
Dept: ANESTHESIOLOGY | Facility: HOSPITAL | Age: 37
End: 2022-08-10

## 2022-08-10 ENCOUNTER — ANESTHESIA (OUTPATIENT)
Dept: ANESTHESIOLOGY | Facility: HOSPITAL | Age: 37
End: 2022-08-10

## 2022-08-10 ENCOUNTER — TELEPHONE (OUTPATIENT)
Dept: OBGYN CLINIC | Facility: CLINIC | Age: 37
End: 2022-08-10

## 2022-08-10 PROCEDURE — NC001 PR NO CHARGE: Performed by: OBSTETRICS & GYNECOLOGY

## 2022-08-10 NOTE — H&P
H&P Exam - Gynecology   Earnest Joe Ramírez 44 y o  female MRN: [id number]  Unit/Bed#:  Encounter: [id number]    Assessment/Plan     Assessment:  1  Menorrhagia    Plan:   Consent was obtained for removal of IUD, D and C, NovaSure ablation, hysteroscopy  Risks and benefits reviewed including failure rate, injury to adjacent structures requiring further surgery  We discussed sterilization, not interested,  will be getting vasectomy  All questions answered  Reviewed recent labs, no evidence of anemia  She will schedule postop visit 2-3 weeks  All questions answered  History of Present Illness     HPI:  Laurance Moritz is a 44 y o  female  ([facility]) referred to me for endometrial ablation  She had the Nadiya Fudge IUD inserted 2021 and has been having menstrual cycles every 4 weeks with significant breakthrough bleeding  Prior to the IUD her menstrual cycles are approximately every 4 weeks lasting 7 days described as very heavy for 3-4 days  She states the menstrual flow has decreased since the IUDs been in place, but finds spotting intermittently difficult to tolerate  She is sexually active and has been in a monogamous relationship for over 12 years  They are not interested in future pregnancies      Patient has been on OCPs in the past including Loestrin , Keshia/Guillermina  At this point she prefers to be off all hormones  She states her  will be getting a vasectomy  Pelvic ultrasound in  revealed 1 5 cm fibroid  Patient also underwent D and C polypectomy 2020, pelvic ultrasound reveals normal size uterus, anteverted measuring 10 x 3 x 5 cm, normal endometrial stripe, 1 9 x 1 3 posterior fibroid, possible submucosal component, normal ovaries x2, IUD in position, low lying    Endometrial biopsy 2022 benign endometrium    Review of Systems   Constitutional: Negative for fatigue, fever and unexpected weight change     Respiratory: Negative for cough, chest tightness, shortness of breath and wheezing  Cardiovascular: Negative  Negative for chest pain and palpitations  Gastrointestinal: Negative  Negative for abdominal distention, abdominal pain, blood in stool, constipation, diarrhea, nausea and vomiting  Genitourinary: Positive for menstrual problem  Negative for difficulty urinating, dyspareunia, dysuria, flank pain, frequency, genital sores, hematuria, pelvic pain, urgency, vaginal bleeding, vaginal discharge and vaginal pain  Skin: Negative for rash         Historical Information   Past Medical History:   Diagnosis Date    Abnormal Pap smear of cervix     Allergic rhinitis     Anxiety     Anxiety and depression     Chronic post-traumatic stress disorder (PTSD)     GERD (gastroesophageal reflux disease)     HPV (human papilloma virus) infection     Hx of colposcopy with cervical biopsy     Hyperlipidemia     Migraine      Past Surgical History:   Procedure Laterality Date    COLPOSCOPY      with biopsy with endocervical currettage     CONTRACEPTIVE CAPSULE REMOVAL      Last assessed: 4/1/2014    MA HYSTEROSCOPY,W/ENDO BX N/A 1/6/2020    Procedure: DILATATION AND CURETTAGE (D&C) WITH HYSTEROSCOPY Polypectomy;  Surgeon: Danny Vee MD;  [facility];  Service: Gynecology    TONSILLECTOMY       OB/GYN History:  As above  Family History   Problem Relation Age of Onset    Hyperlipidemia Mother     Heart disease Mother     Heart disease Father     Hyperlipidemia Father     No Known Problems Maternal Grandmother     No Known Problems Maternal Grandfather     No Known Problems Paternal Grandmother     No Known Problems Paternal Grandfather     Colon cancer Maternal Uncle 36    Breast cancer Cousin 50    Breast cancer Family     Diabetes Family     Heart disease Family     Diabetes type II Family     Hypertension Family     No Known Problems Maternal Aunt     No Known Problems Paternal Aunt      Social History   Social History     Substance and Sexual Activity   Alcohol Use Yes    Alcohol/week: 1 0 standard drink    Types: 1 Glasses of wine per week    Comment: occ     Social History     Substance and Sexual Activity   Drug Use Yes    Frequency: 7 0 times per week    Types: Marijuana    Comment: medical marijuana      Social History     Tobacco Use   Smoking Status Never Smoker   Smokeless Tobacco Never Used     E-Cigarette/Vaping    E-Cigarette Use Never User      E-Cigarette/Vaping Substances    Nicotine No     THC No     CBD No     Flavoring No     Other No     Unknown No        Meds/Allergies   all current active meds have been reviewed  No Known Allergies    Objective   Vitals: Last menstrual period 07/25/2022, not currently breastfeeding  No intake or output data in the 24 hours ending 08/10/22 1243    Invasive Devices: Invasive Devices  Report    None                 Physical Exam  Constitutional:       Appearance: Normal appearance  HENT:      Head: Normocephalic and atraumatic  Cardiovascular:      Rate and Rhythm: Normal rate and regular rhythm  Pulmonary:      Effort: Pulmonary effort is normal       Breath sounds: Normal breath sounds  Abdominal:      General: Bowel sounds are normal  There is no distension  Palpations: Abdomen is soft  Tenderness: There is no abdominal tenderness  There is no guarding or rebound  Genitourinary:     Labia:         Right: No rash, tenderness or lesion  Left: No rash, tenderness or lesion  Vagina: No signs of injury  No vaginal discharge or tenderness  Cervix: No cervical motion tenderness, discharge, friability, lesion, erythema or cervical bleeding  Uterus: Not enlarged and not tender  Adnexa:         Right: No mass, tenderness or fullness  Left: No mass, tenderness or fullness  Neurological:      Mental Status: She is alert and oriented to person, place, and time     Psychiatric:         Behavior: Behavior normal       Extremity:  Present x4    Lab Results: I have personally reviewed pertinent reports  Imaging: I have personally reviewed pertinent reports  EKG, Pathology, and Other Studies: I have personally reviewed pertinent reports  Code Status: No Order  Advance Directive and Living Will:      Power of :    POLST:      Counseling / Coordination of Care  Total floor / unit time spent today 30 minutes  Greater than 50% of total time was spent with the patient and / or family counseling and / or coordination of care  A description of the counseling / coordination of care

## 2022-08-10 NOTE — TELEPHONE ENCOUNTER
Patient informed endometrial biopsy was benign  Also everything is good to proceed with surgery tomorrow

## 2022-08-11 ENCOUNTER — HOSPITAL ENCOUNTER (OUTPATIENT)
Facility: HOSPITAL | Age: 37
Setting detail: OUTPATIENT SURGERY
Discharge: HOME | End: 2022-08-11
Attending: OBSTETRICS & GYNECOLOGY | Admitting: OBSTETRICS & GYNECOLOGY
Payer: COMMERCIAL

## 2022-08-11 ENCOUNTER — ANESTHESIA (OUTPATIENT)
Dept: PERIOP | Facility: HOSPITAL | Age: 37
End: 2022-08-11
Payer: COMMERCIAL

## 2022-08-11 VITALS
HEART RATE: 53 BPM | OXYGEN SATURATION: 100 % | WEIGHT: 173 LBS | RESPIRATION RATE: 18 BRPM | SYSTOLIC BLOOD PRESSURE: 121 MMHG | DIASTOLIC BLOOD PRESSURE: 76 MMHG | TEMPERATURE: 97.6 F | HEIGHT: 66 IN | BODY MASS INDEX: 27.8 KG/M2

## 2022-08-11 DIAGNOSIS — N92.0 EXCESSIVE OR FREQUENT MENSTRUATION: ICD-10-CM

## 2022-08-11 LAB
EXT PREGNANCY TEST URINE: NEGATIVE
EXT. CONTROL: NORMAL

## 2022-08-11 PROCEDURE — 81025 URINE PREGNANCY TEST: CPT | Performed by: OBSTETRICS & GYNECOLOGY

## 2022-08-11 PROCEDURE — 88305 TISSUE EXAM BY PATHOLOGIST: CPT | Performed by: PATHOLOGY

## 2022-08-11 PROCEDURE — 58301 REMOVE INTRAUTERINE DEVICE: CPT | Performed by: OBSTETRICS & GYNECOLOGY

## 2022-08-11 PROCEDURE — 58563 HYSTEROSCOPY ABLATION: CPT | Performed by: OBSTETRICS & GYNECOLOGY

## 2022-08-11 RX ORDER — FENTANYL CITRATE/PF 50 MCG/ML
25 SYRINGE (ML) INJECTION
Status: DISCONTINUED | OUTPATIENT
Start: 2022-08-11 | End: 2022-08-11 | Stop reason: HOSPADM

## 2022-08-11 RX ORDER — SODIUM CHLORIDE, SODIUM LACTATE, POTASSIUM CHLORIDE, CALCIUM CHLORIDE 600; 310; 30; 20 MG/100ML; MG/100ML; MG/100ML; MG/100ML
75 INJECTION, SOLUTION INTRAVENOUS CONTINUOUS
Status: DISCONTINUED | OUTPATIENT
Start: 2022-08-11 | End: 2022-08-11 | Stop reason: HOSPADM

## 2022-08-11 RX ORDER — DEXAMETHASONE SODIUM PHOSPHATE 10 MG/ML
INJECTION, SOLUTION INTRAMUSCULAR; INTRAVENOUS AS NEEDED
Status: DISCONTINUED | OUTPATIENT
Start: 2022-08-11 | End: 2022-08-11

## 2022-08-11 RX ORDER — KETOROLAC TROMETHAMINE 30 MG/ML
INJECTION, SOLUTION INTRAMUSCULAR; INTRAVENOUS AS NEEDED
Status: DISCONTINUED | OUTPATIENT
Start: 2022-08-11 | End: 2022-08-11

## 2022-08-11 RX ORDER — HYDROMORPHONE HCL IN WATER/PF 6 MG/30 ML
0.2 PATIENT CONTROLLED ANALGESIA SYRINGE INTRAVENOUS
Status: DISCONTINUED | OUTPATIENT
Start: 2022-08-11 | End: 2022-08-11 | Stop reason: HOSPADM

## 2022-08-11 RX ORDER — FENTANYL CITRATE 50 UG/ML
INJECTION, SOLUTION INTRAMUSCULAR; INTRAVENOUS AS NEEDED
Status: DISCONTINUED | OUTPATIENT
Start: 2022-08-11 | End: 2022-08-11

## 2022-08-11 RX ORDER — ACETAMINOPHEN 325 MG/1
975 TABLET ORAL EVERY 6 HOURS PRN
Status: DISCONTINUED | OUTPATIENT
Start: 2022-08-11 | End: 2022-08-11 | Stop reason: HOSPADM

## 2022-08-11 RX ORDER — ONDANSETRON 2 MG/ML
INJECTION INTRAMUSCULAR; INTRAVENOUS AS NEEDED
Status: DISCONTINUED | OUTPATIENT
Start: 2022-08-11 | End: 2022-08-11

## 2022-08-11 RX ORDER — ONDANSETRON 2 MG/ML
4 INJECTION INTRAMUSCULAR; INTRAVENOUS EVERY 8 HOURS PRN
Status: DISCONTINUED | OUTPATIENT
Start: 2022-08-11 | End: 2022-08-11 | Stop reason: HOSPADM

## 2022-08-11 RX ORDER — OXYCODONE HYDROCHLORIDE 5 MG/1
5 TABLET ORAL EVERY 4 HOURS PRN
Status: DISCONTINUED | OUTPATIENT
Start: 2022-08-11 | End: 2022-08-11 | Stop reason: HOSPADM

## 2022-08-11 RX ORDER — IBUPROFEN 400 MG/1
600 TABLET ORAL EVERY 6 HOURS PRN
Status: DISCONTINUED | OUTPATIENT
Start: 2022-08-11 | End: 2022-08-11 | Stop reason: HOSPADM

## 2022-08-11 RX ORDER — PROPOFOL 10 MG/ML
INJECTION, EMULSION INTRAVENOUS AS NEEDED
Status: DISCONTINUED | OUTPATIENT
Start: 2022-08-11 | End: 2022-08-11

## 2022-08-11 RX ORDER — IBUPROFEN 400 MG/1
600 TABLET ORAL EVERY 6 HOURS PRN
Status: DISCONTINUED | OUTPATIENT
Start: 2022-08-11 | End: 2022-08-11

## 2022-08-11 RX ORDER — MIDAZOLAM HYDROCHLORIDE 2 MG/2ML
INJECTION, SOLUTION INTRAMUSCULAR; INTRAVENOUS AS NEEDED
Status: DISCONTINUED | OUTPATIENT
Start: 2022-08-11 | End: 2022-08-11

## 2022-08-11 RX ORDER — ONDANSETRON 2 MG/ML
4 INJECTION INTRAMUSCULAR; INTRAVENOUS ONCE AS NEEDED
Status: DISCONTINUED | OUTPATIENT
Start: 2022-08-11 | End: 2022-08-11 | Stop reason: HOSPADM

## 2022-08-11 RX ORDER — LIDOCAINE HYDROCHLORIDE 10 MG/ML
INJECTION, SOLUTION EPIDURAL; INFILTRATION; INTRACAUDAL; PERINEURAL AS NEEDED
Status: DISCONTINUED | OUTPATIENT
Start: 2022-08-11 | End: 2022-08-11

## 2022-08-11 RX ORDER — SODIUM CHLORIDE, SODIUM LACTATE, POTASSIUM CHLORIDE, CALCIUM CHLORIDE 600; 310; 30; 20 MG/100ML; MG/100ML; MG/100ML; MG/100ML
INJECTION, SOLUTION INTRAVENOUS CONTINUOUS PRN
Status: DISCONTINUED | OUTPATIENT
Start: 2022-08-11 | End: 2022-08-11

## 2022-08-11 RX ADMIN — MIDAZOLAM 2 MG: 1 INJECTION INTRAMUSCULAR; INTRAVENOUS at 08:01

## 2022-08-11 RX ADMIN — ACETAMINOPHEN 975 MG: 325 TABLET ORAL at 09:37

## 2022-08-11 RX ADMIN — FENTANYL CITRATE 50 MCG: 50 INJECTION INTRAMUSCULAR; INTRAVENOUS at 08:22

## 2022-08-11 RX ADMIN — DEXAMETHASONE SODIUM PHOSPHATE 10 MG: 10 INJECTION, SOLUTION INTRAMUSCULAR; INTRAVENOUS at 08:06

## 2022-08-11 RX ADMIN — PROPOFOL 250 MG: 10 INJECTION, EMULSION INTRAVENOUS at 08:05

## 2022-08-11 RX ADMIN — SODIUM CHLORIDE, SODIUM LACTATE, POTASSIUM CHLORIDE, AND CALCIUM CHLORIDE: .6; .31; .03; .02 INJECTION, SOLUTION INTRAVENOUS at 07:59

## 2022-08-11 RX ADMIN — KETOROLAC TROMETHAMINE 30 MG: 30 INJECTION, SOLUTION INTRAMUSCULAR; INTRAVENOUS at 08:31

## 2022-08-11 RX ADMIN — ONDANSETRON HYDROCHLORIDE 4 MG: 2 INJECTION, SOLUTION INTRAMUSCULAR; INTRAVENOUS at 08:21

## 2022-08-11 RX ADMIN — LIDOCAINE HYDROCHLORIDE 50 MG: 10 INJECTION, SOLUTION EPIDURAL; INFILTRATION; INTRACAUDAL at 08:05

## 2022-08-11 NOTE — ANESTHESIA PREPROCEDURE EVALUATION
Procedure:  DILATATION AND CURETTAGE (D&C) WITH HYSTEROSCOPY W/ NOVASURE (N/A Uterus)  ABLATION ENDOMETRIAL NOVASURE (N/A Uterus)  REMOVAL OF INTRAUTERINE DEVICE (IUD) (N/A Uterus)    Relevant Problems   CARDIO   (+) Mixed hyperlipidemia      GI/HEPATIC   (+) Gastroesophageal reflux disease without esophagitis      NEURO/PSYCH   (+) Anxiety        Physical Exam    Airway    Mallampati score: I  TM Distance: >3 FB  Neck ROM: full     Dental   No notable dental hx     Cardiovascular  Cardiovascular exam normal    Pulmonary  Pulmonary exam normal     Other Findings        Anesthesia Plan  ASA Score- 1     Anesthesia Type- general with ASA Monitors  Additional Monitors:   Airway Plan: LMA  Plan Factors-    Chart reviewed  Existing labs reviewed  Patient summary reviewed  Patient is not a current smoker  Induction- intravenous  Postoperative Plan- Plan for postoperative opioid use  Planned trial extubation    Informed Consent- Anesthetic plan and risks discussed with patient  I personally reviewed this patient with the CRNA  Discussed and agreed on the Anesthesia Plan with the CRNA  Kirsten Costa

## 2022-08-11 NOTE — ANESTHESIA POSTPROCEDURE EVALUATION
Post-Op Assessment Note    CV Status:  Stable  Pain Score: 0    Pain management: adequate     Mental Status:  Alert   Hydration Status:  Stable   PONV Controlled:  None   Airway Patency:  Patent      Post Op Vitals Reviewed: Yes      Staff: Anesthesiologist, CRNA         No complications documented      /76 (08/11/22 0923)    Temp 97 6 °F (36 4 °C) (08/11/22 0923)    Pulse (!) 53 (08/11/22 0923)   Resp 18 (08/11/22 0923)    SpO2 100 % (08/11/22 0923)

## 2022-08-11 NOTE — OP NOTE
OPERATIVE REPORT  PATIENT NAME: Dewayne Ramos    :  1985  MRN: [id number]  Pt Location: [facility] RO[facility] 03    SURGERY DATE: 2022    Surgeon(s) and Role:     DO Raheem Steiner Primary    Preop Diagnosis:  Excessive or frequent menstruation [N92 0]    Post-Op Diagnosis Codes:     * Excessive or frequent menstruation [N92 0]    Procedure(s) (LRB):  DILATATION AND CURETTAGE (D&C) WITH HYSTEROSCOPY W/ NOVASURE (N/A)  ABLATION ENDOMETRIAL NOVASURE (N/A)  REMOVAL OF INTRAUTERINE DEVICE (IUD) (N/A)    Specimen(s):  ID Type Source Tests Collected by Time Destination   1 : [facility] Tissue Endometrium TISSUE EXAM Danny Farah DO 2022 0822        Estimated Blood Loss:   Minimal    Drains:  * No LDAs found *    Anesthesia Type:   General    Operative Indications:  Excessive or frequent menstruation [N92 0]      Operative Findings:  Normal size uterus, slightly anteverted, grade 2 uterine descensus, submucosal fibroid abutting endometrium, normal adnexa    Complications:   None    Procedure and Technique:  Patient was taken back to the operating room and identified by surgeon and Anesthesia staff  She is placed in the supine position  After receiving adequate anesthesia she was then placed in the dorsolithotomy position  The vulva and vagina were then prepped with Betadine solution and draped normal sterile fashion  Time-out was taken according to hospital protocol  Next the cervix was visualized grasped with an Allis clamp  IUD string was visualized  Using polyp forceps the IUD was removed without difficulty  Next, the uterus sounded to 9 cm  Cervical length was assessed at 4 cm  Cervical os was noted to be slightly dilated  Next gentle curettage was obtained and tissue was sent to pathology  Next the NovaSure device was then placed into the uterine cavity  Cavity length was assessed at 5 cm, cavity with 4 4 cm  The device seated appropriately  Cavity assessment was found to be successful  Next the ablation was then carried out for 45 seconds at 121 w  The NovaSure device was then removed from the cavity  Next hysteroscopy was then carried out revealing adequate charting from coronal to coronal region  All instruments were then removed from the vagina  Sponge lap and needle counts correct x2  Patient was then awakened from anesthesia and transferred to recovery room in stable condition           I was present for the entire procedure    Patient Disposition:  PACU       SIGNATURE: Samantha Medellin DO  DATE: August 11, 2022  TIME: 8:49 AM

## 2022-08-11 NOTE — INTERVAL H&P NOTE
H&P reviewed  After examining the patient I find no changes in the patients condition since the H&P had been written      Vitals:    08/11/22 0649   BP: 108/70   Pulse: 70   Resp: 16   Temp: 98 1 °F (36 7 °C)   SpO2: 97%

## 2022-08-17 ENCOUNTER — TELEPHONE (OUTPATIENT)
Dept: HEMATOLOGY ONCOLOGY | Facility: CLINIC | Age: 37
End: 2022-08-17

## 2022-08-17 DIAGNOSIS — K21.9 GASTROESOPHAGEAL REFLUX DISEASE WITHOUT ESOPHAGITIS: Primary | ICD-10-CM

## 2022-08-17 RX ORDER — SUCRALFATE 1 G/1
1 TABLET ORAL 4 TIMES DAILY
Qty: 120 TABLET | Refills: 1 | Status: SHIPPED | OUTPATIENT
Start: 2022-08-17

## 2022-08-17 RX ORDER — SUCRALFATE 1 G/1
TABLET ORAL
COMMUNITY
Start: 2022-05-25 | End: 2022-08-17 | Stop reason: SDUPTHER

## 2022-08-17 NOTE — TELEPHONE ENCOUNTER
Pt asking for Sucralafate 1g tabs #28 1 qid prn heartburn  This Rx was originally written at my ER visit in May   Pt is still waiting on a GI appointment but needs a refill until then

## 2022-08-17 NOTE — TELEPHONE ENCOUNTER
Attempt to schedule consult - Patient states she does not know why she was referred  Third attempt and second contact made with patient - instructed patient she is welcome to call back any time to schedule  Referral closed

## 2022-08-18 ENCOUNTER — TELEMEDICINE (OUTPATIENT)
Dept: INTERNAL MEDICINE CLINIC | Facility: CLINIC | Age: 37
End: 2022-08-18
Payer: COMMERCIAL

## 2022-08-18 DIAGNOSIS — U07.1 COVID-19 VIRUS DETECTED: Primary | ICD-10-CM

## 2022-08-18 PROCEDURE — 99213 OFFICE O/P EST LOW 20 MIN: CPT | Performed by: INTERNAL MEDICINE

## 2022-08-18 NOTE — PROGRESS NOTES
Virtual Regular Visit    Verification of patient location:    Patient is located in the following state in which I hold an active license [place]      Assessment/Plan:    Problem List Items Addressed This Visit        Other    COVID-19 virus detected - Primary               Reason for visit is   Chief Complaint   Patient presents with    MyChart tested positive for COVID today  Cough and sore throat   Virtual Regular Visit        Encounter provider Kavon Lewis MD    Provider located at [address] [address] [phone]      Recent Visits  No visits were found meeting these conditions  Showing recent visits within past 7 days and meeting all other requirements  Today's Visits  Date Type Provider Dept   08/18/22 Telemedicine Kavon Lewis MD [facility]  [address] today's visits and meeting all other requirements  Future Appointments  No visits were found meeting these conditions  Showing future appointments within next 150 days and meeting all other requirements       The patient was identified by name and date of birth  Peola November was informed that this is a telemedicine visit and that the visit is being conducted through [address] [place] [id number] Now and patient was informed that this is a secure, HIPAA-compliant platform  She agrees to proceed     My office door was closed  No one else was in the room  She acknowledged consent and understanding of privacy and security of the video platform  The patient has agreed to participate and understands they can discontinue the visit at any time  Patient is aware this is a billable service  Rivas Galvan is a 44 y o  female sick         Sick, cough, sore throat, tested for COVID positive       Past Medical History:   Diagnosis Date    Abnormal Pap smear of cervix     Allergic rhinitis     Anxiety     Anxiety and depression     Chronic post-traumatic stress disorder (PTSD)     GERD (gastroesophageal reflux disease)     HPV (human papilloma virus) infection     Hx of colposcopy with cervical biopsy     Hyperlipidemia     Migraine        Past Surgical History:   Procedure Laterality Date    COLPOSCOPY      with biopsy with endocervical currettage     CONTRACEPTIVE CAPSULE REMOVAL      Last assessed: 4/1/2014    NC HYSTEROSCOPY,W/ENDO BX N/A 1/6/2020    Procedure: DILATATION AND CURETTAGE (D&C) WITH HYSTEROSCOPY Polypectomy;  Surgeon: Ravi Coker MD;  [facility];  Service: Gynecology    NC HYSTEROSCOPY,W/ENDO [place] [address] N/A 8/11/2022    Procedure: DILATATION AND CURETTAGE (D&C) WITH HYSTEROSCOPY W/ Aulander Flair;  Surgeon: Leticia Saleh DO;  [facility];  Service: Gynecology    NC HYSTEROSCOPY,W/ENDOMETRIAL ABLATION N/A 8/11/2022    Procedure: ABLATION ENDOMETRIAL Kadeem Flair;  Surgeon: Leticia Saleh DO;  [facility];  Service: Gynecology    NC REMOVE INTRAUTERINE DEVICE N/A 8/11/2022    Procedure: REMOVAL OF INTRAUTERINE DEVICE (IUD);   Surgeon: Leticia Saleh DO;  [facility];  Service: Gynecology    TONSILLECTOMY         Current Outpatient Medications   Medication Sig Dispense Refill    ALPRAZolam (XANAX) 0 25 mg tablet Take 1 tablet (0 25 mg total) by mouth daily at bedtime as needed for anxiety 30 tablet 0    ascorbic acid (VITAMIN C) 500 mg tablet Take 500 mg by mouth daily      ibuprofen (MOTRIN) 200 mg tablet Take 3 tablets (600 mg total) by mouth every 6 (six) hours as needed for cramping (Patient taking differently: Take 600 mg by mouth as needed for cramping)  0    Multiple Vitamin (MULTI-VITAMIN DAILY) TABS Take by mouth daily after breakfast      omeprazole (PriLOSEC) 10 mg delayed release capsule TAKE 1 CAPSULE BY MOUTH EVERY DAY 90 capsule 0    omeprazole (PriLOSEC) 20 mg delayed release capsule Take 1 capsule (20 mg total) by mouth daily 90 capsule 1    omeprazole (PriLOSEC) 40 MG capsule Take 1 capsule (40 mg total) by mouth daily before breakfast 90 capsule 0    phentermine 15 MG capsule Take 1 capsule (15 mg total) by mouth daily 30 capsule 0    Probiotic Product (PROBIOTIC-10) CAPS Take by mouth daily       sucralfate (CARAFATE) 1 g tablet Take 1 tablet (1 g total) by mouth 4 (four) times a day 120 tablet 1     No current facility-administered medications for this visit  No Known Allergies    Review of Systems   Constitutional: Negative for chills and fever  HENT: Positive for sore throat  Negative for congestion and ear pain  Eyes: Negative for pain  Respiratory: Positive for cough  Negative for shortness of breath  Cardiovascular: Negative for chest pain and leg swelling  Gastrointestinal: Negative for abdominal pain, nausea and vomiting  Endocrine: Negative for polyuria  Genitourinary: Negative for difficulty urinating, frequency and urgency  Musculoskeletal: Negative for arthralgias and back pain  Skin: Negative for rash  Neurological: Negative for weakness and headaches  Psychiatric/Behavioral: Negative for sleep disturbance  The patient is not nervous/anxious  Video Exam    Vitals:       Physical Exam  Eyes:      Extraocular Movements: Extraocular movements intact  Conjunctiva/sclera: Conjunctivae normal    Neurological:      Mental Status: She is alert and oriented to person, place, and time            I spent 20 minutes directly with the patient during this visit

## 2022-08-18 NOTE — LETTER
August 18, 2022     Patient: Haris Clemente  YOB: 1985  Date of Visit: 8/18/2022      To Whom it May Concern:    Ashley Varghese is under my professional care  Yee Mary was seen in my office on 8/18/2022  Yee Hernandez may return to work on 08/22/2022, work excuse from 8/17/2022-8/21/2022       If you have any questions or concerns, please don't hesitate to call           Sincerely,          Jada Palencia MD        CC: No Recipients

## 2022-09-01 ENCOUNTER — OFFICE VISIT (OUTPATIENT)
Dept: OBGYN CLINIC | Facility: CLINIC | Age: 37
End: 2022-09-01

## 2022-09-01 VITALS
WEIGHT: 171 LBS | BODY MASS INDEX: 27.48 KG/M2 | HEIGHT: 66 IN | SYSTOLIC BLOOD PRESSURE: 112 MMHG | DIASTOLIC BLOOD PRESSURE: 68 MMHG

## 2022-09-01 DIAGNOSIS — Z09 POSTOP CHECK: Primary | ICD-10-CM

## 2022-09-01 PROCEDURE — 99024 POSTOP FOLLOW-UP VISIT: CPT | Performed by: OBSTETRICS & GYNECOLOGY

## 2022-09-01 NOTE — PROGRESS NOTES
Assessment/Plan:  All restrictions lifted  She will continue to use condoms for birth control  She will keep a menstrual diary over the next 3 months and call with update or p r n  Resume annual gyn exam with provider as scheduled  No problem-specific Assessment & Plan notes found for this encounter  Diagnoses and all orders for this visit:    Postop check          Subjective:      Patient ID: Eri Douglas is a 44 y o  female  HPI     This is a pleasant 25-year-old female  ([facility] x1) status post D and C, NovaSure ablation, hysteroscopy now postop 3 weeks  She was referred to me for endometrial ablation  She was never interested in pregnancies  She has been in a monogamous relationship for over 12 years  They are using condoms as a form of contraception  She is doing well  She states discharge has stopped approximately 4 days ago  She denies any pelvic pain  The following portions of the patient's history were reviewed and updated as appropriate: allergies, current medications, past family history, past medical history, past social history, past surgical history and problem list     Review of Systems   Constitutional: Negative for fatigue, fever and unexpected weight change  Respiratory: Negative for cough, chest tightness, shortness of breath and wheezing  Cardiovascular: Negative  Negative for chest pain and palpitations  Gastrointestinal: Negative  Negative for abdominal distention, abdominal pain, blood in stool, constipation, diarrhea, nausea and vomiting  Genitourinary: Negative  Negative for difficulty urinating, dyspareunia, dysuria, flank pain, frequency, genital sores, hematuria, pelvic pain, urgency, vaginal bleeding, vaginal discharge and vaginal pain  Skin: Negative for rash           Objective:      /68   Ht 5' 6" (1 676 m)   Wt 77 6 kg (171 lb)   LMP 2022   BMI 27 60 kg/m²          Physical Exam      External genitalia is within normal limits  The vagina is well estrogenized  Cervix is parous  There is no vaginal discharge  Uterus is normal size nontender

## 2022-09-02 RX ORDER — PHENTERMINE HYDROCHLORIDE 15 MG/1
15 CAPSULE ORAL DAILY
Qty: 30 CAPSULE | Refills: 0 | Status: SHIPPED | OUTPATIENT
Start: 2022-09-02 | End: 2022-10-24 | Stop reason: SDUPTHER

## 2022-10-24 RX ORDER — PHENTERMINE HYDROCHLORIDE 15 MG/1
15 CAPSULE ORAL DAILY
Qty: 30 CAPSULE | Refills: 0 | Status: SHIPPED | OUTPATIENT
Start: 2022-10-24

## 2022-11-09 ENCOUNTER — OFFICE VISIT (OUTPATIENT)
Dept: INTERNAL MEDICINE CLINIC | Facility: CLINIC | Age: 37
End: 2022-11-09

## 2022-11-09 VITALS
TEMPERATURE: 98.6 F | OXYGEN SATURATION: 98 % | BODY MASS INDEX: 27.9 KG/M2 | SYSTOLIC BLOOD PRESSURE: 112 MMHG | WEIGHT: 173.6 LBS | DIASTOLIC BLOOD PRESSURE: 72 MMHG | HEIGHT: 66 IN | HEART RATE: 78 BPM

## 2022-11-09 DIAGNOSIS — K21.9 GASTROESOPHAGEAL REFLUX DISEASE WITHOUT ESOPHAGITIS: Primary | ICD-10-CM

## 2022-11-09 DIAGNOSIS — E78.2 MIXED HYPERLIPIDEMIA: ICD-10-CM

## 2022-11-09 DIAGNOSIS — F34.1 DYSTHYMIC DISORDER: ICD-10-CM

## 2022-11-09 RX ORDER — BUPROPION HYDROCHLORIDE 150 MG/1
150 TABLET ORAL EVERY MORNING
Qty: 30 TABLET | Refills: 1 | Status: SHIPPED | OUTPATIENT
Start: 2022-11-09

## 2022-11-09 RX ORDER — PHENTERMINE HYDROCHLORIDE 15 MG/1
15 CAPSULE ORAL DAILY
Qty: 30 CAPSULE | Refills: 0 | Status: SHIPPED | OUTPATIENT
Start: 2022-11-09

## 2022-11-09 NOTE — PROGRESS NOTES
Assessment/Plan:             1  Gastroesophageal reflux disease without esophagitis  -     Ambulatory Referral to Gastroenterology; Future    2  Dysthymic disorder  -     buPROPion (Wellbutrin XL) 150 mg 24 hr tablet; Take 1 tablet (150 mg total) by mouth every morning    3  Body mass index 28 0-28 9, adult  -     phentermine 15 MG capsule; Take 1 capsule (15 mg total) by mouth daily    4  Mixed hyperlipidemia         Subjective:      Patient ID: Brandon Nelson is a 44 y o  female  Follow-up on multiple medical problems to ensure the stable on current medications, sometimes feels burning chest pain, feels her GERD is still not under control      The following portions of the patient's history were reviewed and updated as appropriate: She  has a past medical history of Abnormal Pap smear of cervix, Allergic rhinitis, Anxiety, Anxiety and depression, Chronic post-traumatic stress disorder (PTSD), GERD (gastroesophageal reflux disease), HPV (human papilloma virus) infection, colposcopy with cervical biopsy, Hyperlipidemia, and Migraine  She   Patient Active Problem List    Diagnosis Date Noted   • Dysthymic disorder 11/09/2022   • COVID-19 virus detected 08/18/2022   • Menorrhagia with regular cycle 08/11/2022   • Body mass index 28 0-28 9, adult 02/22/2021   • Gastroesophageal reflux disease without esophagitis    • Mixed hyperlipidemia    • S/P dilation and curettage 01/06/2020   • Endometrial polyp 11/20/2019   • Abnormal uterine bleeding 11/20/2019   • Anxiety 04/05/2019   • Premenstrual syndrome 03/24/2017     She  has a past surgical history that includes Colposcopy; Contraceptive capsule removal; pr hysteroscopy,w/endo bx (N/A, 1/6/2020); Tonsillectomy; pr hysteroscopy,w/endo bx (N/A, 8/11/2022); pr hysteroscopy,w/endometrial ablation (N/A, 8/11/2022); and pr remove intrauterine device (N/A, 8/11/2022)    Her family history includes Breast cancer in her family; Breast cancer (age of onset: 50) in her cousin; Colon cancer (age of onset: 36) in her maternal uncle; Diabetes in her family; Diabetes type II in her family; Heart disease in her family, father, and mother; Hyperlipidemia in her father and mother; Hypertension in her family; No Known Problems in her maternal aunt, maternal grandfather, maternal grandmother, paternal aunt, paternal grandfather, and paternal grandmother  She  reports that she has never smoked  She has never used smokeless tobacco  She reports current alcohol use of about 1 0 standard drink of alcohol per week  She reports current drug use  Frequency: 7 00 times per week  Drug: Marijuana    Current Outpatient Medications   Medication Sig Dispense Refill   • ALPRAZolam (XANAX) 0 25 mg tablet Take 1 tablet (0 25 mg total) by mouth daily at bedtime as needed for anxiety 30 tablet 0   • ascorbic acid (VITAMIN C) 500 mg tablet Take 500 mg by mouth daily     • buPROPion (Wellbutrin XL) 150 mg 24 hr tablet Take 1 tablet (150 mg total) by mouth every morning 30 tablet 1   • Multiple Vitamin (MULTI-VITAMIN DAILY) TABS Take by mouth daily after breakfast     • omeprazole (PriLOSEC) 40 MG capsule Take 1 capsule (40 mg total) by mouth daily before breakfast 90 capsule 0   • phentermine 15 MG capsule Take 1 capsule (15 mg total) by mouth daily 30 capsule 0   • Probiotic Product (PROBIOTIC-10) CAPS Take by mouth daily      • sucralfate (CARAFATE) 1 g tablet Take 1 tablet (1 g total) by mouth 4 (four) times a day 120 tablet 1   • ibuprofen (MOTRIN) 200 mg tablet Take 3 tablets (600 mg total) by mouth every 6 (six) hours as needed for cramping (Patient not taking: Reported on 11/9/2022)  0   • omeprazole (PriLOSEC) 10 mg delayed release capsule TAKE 1 CAPSULE BY MOUTH EVERY DAY (Patient not taking: Reported on 11/9/2022) 90 capsule 0   • omeprazole (PriLOSEC) 20 mg delayed release capsule Take 1 capsule (20 mg total) by mouth daily (Patient not taking: Reported on 11/9/2022) 90 capsule 1     No current facility-administered medications for this visit  Current Outpatient Medications on File Prior to Visit   Medication Sig   • ALPRAZolam (XANAX) 0 25 mg tablet Take 1 tablet (0 25 mg total) by mouth daily at bedtime as needed for anxiety   • ascorbic acid (VITAMIN C) 500 mg tablet Take 500 mg by mouth daily   • Multiple Vitamin (MULTI-VITAMIN DAILY) TABS Take by mouth daily after breakfast   • omeprazole (PriLOSEC) 40 MG capsule Take 1 capsule (40 mg total) by mouth daily before breakfast   • Probiotic Product (PROBIOTIC-10) CAPS Take by mouth daily    • sucralfate (CARAFATE) 1 g tablet Take 1 tablet (1 g total) by mouth 4 (four) times a day   • [DISCONTINUED] phentermine 15 MG capsule Take 1 capsule (15 mg total) by mouth daily   • ibuprofen (MOTRIN) 200 mg tablet Take 3 tablets (600 mg total) by mouth every 6 (six) hours as needed for cramping (Patient not taking: Reported on 11/9/2022)   • omeprazole (PriLOSEC) 10 mg delayed release capsule TAKE 1 CAPSULE BY MOUTH EVERY DAY (Patient not taking: Reported on 11/9/2022)   • omeprazole (PriLOSEC) 20 mg delayed release capsule Take 1 capsule (20 mg total) by mouth daily (Patient not taking: Reported on 11/9/2022)     No current facility-administered medications on file prior to visit  She has No Known Allergies       Review of Systems   Constitutional: Negative for chills and fever  HENT: Negative for congestion, ear pain and sore throat  Eyes: Negative for pain  Respiratory: Negative for cough and shortness of breath  Cardiovascular: Positive for chest pain  Negative for leg swelling  Gastrointestinal: Negative for abdominal pain, nausea and vomiting  Endocrine: Negative for polyuria  Genitourinary: Negative for difficulty urinating, frequency and urgency  Musculoskeletal: Negative for arthralgias and back pain  Skin: Negative for rash  Neurological: Negative for weakness and headaches     Psychiatric/Behavioral: Negative for sleep disturbance  The patient is not nervous/anxious  Objective:      /72 (BP Location: Left arm, Patient Position: Sitting)   Pulse 78   Temp 98 6 °F (37 °C)   Ht 5' 6" (1 676 m)   Wt 78 7 kg (173 lb 9 6 oz)   SpO2 98%   BMI 28 02 kg/m²     No results found for this or any previous visit (from the past 1344 hour(s))  Physical Exam  Constitutional:       Appearance: Normal appearance  HENT:      Head: Normocephalic  Right Ear: Tympanic membrane, ear canal and external ear normal       Left Ear: Tympanic membrane, ear canal and external ear normal       Nose: Nose normal  No congestion  Mouth/Throat:      Mouth: Mucous membranes are moist       Pharynx: Oropharynx is clear  No oropharyngeal exudate or posterior oropharyngeal erythema  Eyes:      Extraocular Movements: Extraocular movements intact  Conjunctiva/sclera: Conjunctivae normal       Pupils: Pupils are equal, round, and reactive to light  Cardiovascular:      Rate and Rhythm: Normal rate and regular rhythm  Heart sounds: Normal heart sounds  No murmur heard  Pulmonary:      Effort: Pulmonary effort is normal       Breath sounds: Normal breath sounds  No wheezing or rales  Abdominal:      General: Bowel sounds are normal  There is no distension  Palpations: Abdomen is soft  Tenderness: There is no abdominal tenderness  Musculoskeletal:         General: Normal range of motion  Cervical back: Normal range of motion and neck supple  Right lower leg: No edema  Left lower leg: No edema  Lymphadenopathy:      Cervical: No cervical adenopathy  Skin:     General: Skin is warm  Neurological:      General: No focal deficit present  Mental Status: She is alert and oriented to person, place, and time

## 2022-12-02 DIAGNOSIS — K21.9 GASTROESOPHAGEAL REFLUX DISEASE WITHOUT ESOPHAGITIS: ICD-10-CM

## 2022-12-02 RX ORDER — OMEPRAZOLE 40 MG/1
40 CAPSULE, DELAYED RELEASE ORAL
Qty: 90 CAPSULE | Refills: 0 | Status: SHIPPED | OUTPATIENT
Start: 2022-12-02

## 2023-01-07 DIAGNOSIS — F34.1 DYSTHYMIC DISORDER: ICD-10-CM

## 2023-01-09 RX ORDER — BUPROPION HYDROCHLORIDE 150 MG/1
150 TABLET ORAL EVERY MORNING
Qty: 30 TABLET | Refills: 0 | Status: SHIPPED | OUTPATIENT
Start: 2023-01-09 | End: 2023-01-23 | Stop reason: SDUPTHER

## 2023-01-09 RX ORDER — PHENTERMINE HYDROCHLORIDE 15 MG/1
15 CAPSULE ORAL DAILY
Qty: 30 CAPSULE | Refills: 0 | Status: SHIPPED | OUTPATIENT
Start: 2023-01-09 | End: 2023-01-23 | Stop reason: SDUPTHER

## 2023-01-20 ENCOUNTER — OFFICE VISIT (OUTPATIENT)
Dept: GASTROENTEROLOGY | Facility: AMBULARY SURGERY CENTER | Age: 38
End: 2023-01-20

## 2023-01-20 ENCOUNTER — DOCUMENTATION (OUTPATIENT)
Dept: HEMATOLOGY ONCOLOGY | Facility: CLINIC | Age: 38
End: 2023-01-20

## 2023-01-20 VITALS
HEART RATE: 77 BPM | DIASTOLIC BLOOD PRESSURE: 60 MMHG | WEIGHT: 176 LBS | HEIGHT: 66 IN | OXYGEN SATURATION: 98 % | SYSTOLIC BLOOD PRESSURE: 102 MMHG | BODY MASS INDEX: 28.28 KG/M2

## 2023-01-20 DIAGNOSIS — K21.9 GASTROESOPHAGEAL REFLUX DISEASE WITHOUT ESOPHAGITIS: Primary | ICD-10-CM

## 2023-01-20 RX ORDER — PANTOPRAZOLE SODIUM 40 MG/1
40 TABLET, DELAYED RELEASE ORAL DAILY
Qty: 30 TABLET | Refills: 11 | Status: SHIPPED | OUTPATIENT
Start: 2023-01-20

## 2023-01-20 NOTE — PROGRESS NOTES
Consultation - [facility]  Jarad Martines 1985 female         Chief Complaint: GERD    HPI: 69-year-old female with history of anxiety, depression has problems with chronic acid reflux and reports having worse symptoms since May  Complaining about episodes of chest pain/pressure  She has been on different strengths of omeprazole and currently taking 40 mg daily without any significant help  She takes Carafate as needed  Denies any difficulty swallowing  She has reports having symptoms of nausea when she has worse symptoms of heartburn  Good appetite, no recent weight loss  Regular bowel movements and denies any blood or mucus in the stool  Chaperon: Ms Nunez Gift: Review of Systems   Constitutional: Negative for activity change, appetite change, chills, diaphoresis, fatigue, fever and unexpected weight change  HENT: Negative for ear discharge, ear pain, facial swelling, hearing loss, nosebleeds, sore throat, tinnitus and voice change  Eyes: Negative for pain, discharge, redness, itching and visual disturbance  Respiratory: Negative for apnea, cough, chest tightness, shortness of breath and wheezing  Cardiovascular: Negative for chest pain and palpitations  Gastrointestinal:        As noted in HPI   Endocrine: Negative for cold intolerance, heat intolerance and polyuria  Genitourinary: Negative for difficulty urinating, dysuria, flank pain, hematuria and urgency  Musculoskeletal: Negative for arthralgias, back pain, gait problem, joint swelling and myalgias  Skin: Negative for rash and wound  Neurological: Negative for dizziness, tremors, seizures, speech difficulty, light-headedness, numbness and headaches  Hematological: Negative for adenopathy  Does not bruise/bleed easily  Psychiatric/Behavioral: Negative for agitation, behavioral problems and confusion  The patient is not nervous/anxious           Past Medical History: Diagnosis Date   • Abnormal Pap smear of cervix    • Allergic rhinitis    • Anxiety    • Anxiety and depression    • Chronic post-traumatic stress disorder (PTSD)    • GERD (gastroesophageal reflux disease)    • HPV (human papilloma virus) infection    • Hx of colposcopy with cervical biopsy    • Hyperlipidemia    • Migraine       Past Surgical History:   Procedure Laterality Date   • COLPOSCOPY      with biopsy with endocervical currettage    • CONTRACEPTIVE CAPSULE REMOVAL      Last assessed: 4/1/2014   • GA HYSTEROSCOPY BX ENDOMETRIUM&/POLYPC W/WO D&C N/A 01/06/2020    Procedure: DILATATION AND CURETTAGE (D&C) WITH HYSTEROSCOPY Polypectomy;  Surgeon: Sis Gallagher MD;  [facility]: [facility];  Service: Gynecology   • GA HYSTEROSCOPY BX ENDOMETRIUM&/POLYPC W/WO D&C N/A 08/11/2022    Procedure: DILATATION AND CURETTAGE (D&C) WITH HYSTEROSCOPY W/ Arby Headings;  Surgeon: Maxx Davis DO;  Location: [facility];  Service: Gynecology   • GA HYSTEROSCOPY ENDOMETRIAL ABLATION N/A 08/11/2022    Procedure: ABLATION ENDOMETRIAL Arby Headings;  Surgeon: Maxx Davis DO;  Location: [facility];  Service: Gynecology   • GA REMOVAL INTRAUTERINE DEVICE IUD N/A 08/11/2022    Procedure: REMOVAL OF INTRAUTERINE DEVICE (IUD); Surgeon: Maxx Davis DO;  Location: [facility];  Service: Gynecology   • TONSILLECTOMY     • UPPER GASTROINTESTINAL ENDOSCOPY       Social History     Socioeconomic History   • Marital status: /Civil Union     Spouse name: Not on file   • Number of children: Not on file   • Years of education: Not on file   • Highest education level: Not on file   Occupational History   • Occupation: Pharmacist    Tobacco Use   • Smoking status: Never   • Smokeless tobacco: Never   Vaping Use   • Vaping Use: Never used   Substance and Sexual Activity   • Alcohol use:  Yes     Alcohol/week: 1 0 standard drink     Types: 1 Glasses of wine per week     Comment: occ   • Drug use: Yes     Frequency: 7 0 times per week Types: Marijuana     Comment: medical marijuana    • Sexual activity: Yes     Partners: Male     Birth control/protection: I U D  Other Topics Concern   • Not on file   Social History Narrative    Home environment domestic violence - denied         Alcohol: Negative - As per [employer]      Social Determinants of Health     Financial Resource Strain: Not on file   Food Insecurity: Not on file   Transportation Needs: Not on file   Physical Activity: Not on file   Stress: Not on file   Social Connections: Not on file   Intimate Partner Violence: Not on file   Housing Stability: Not on file     Family History   Problem Relation Age of Onset   • Hyperlipidemia Mother    • Heart disease Mother    • Heart disease Father    • Hyperlipidemia Father    • No Known Problems Maternal Grandmother    • No Known Problems Maternal Grandfather    • No Known Problems Paternal Grandmother    • No Known Problems Paternal Grandfather    • Colon cancer Maternal Uncle 36   • Breast cancer Cousin 50   • Breast cancer Family    • Diabetes Family    • Heart disease Family    • Diabetes type II Family    • Hypertension Family    • No Known Problems Maternal Aunt    • No Known Problems Paternal Aunt      Patient has no known allergies    Current Outpatient Medications   Medication Sig Dispense Refill   • ALPRAZolam (XANAX) 0 25 mg tablet Take 1 tablet (0 25 mg total) by mouth daily at bedtime as needed for anxiety 30 tablet 0   • ascorbic acid (VITAMIN C) 500 mg tablet Take 500 mg by mouth daily     • buPROPion (Wellbutrin XL) 150 mg 24 hr tablet Take 1 tablet (150 mg total) by mouth every morning 30 tablet 0   • Multiple Vitamin (MULTI-VITAMIN DAILY) TABS Take by mouth daily after breakfast     • pantoprazole (PROTONIX) 40 mg tablet Take 1 tablet (40 mg total) by mouth daily 30 tablet 11   • phentermine 15 MG capsule Take 1 capsule (15 mg total) by mouth daily 30 capsule 0   • Probiotic Product (PROBIOTIC-10) CAPS Take by mouth daily • sucralfate (CARAFATE) 1 g tablet Take 1 tablet (1 g total) by mouth 4 (four) times a day 120 tablet 1   • ibuprofen (MOTRIN) 200 mg tablet Take 3 tablets (600 mg total) by mouth every 6 (six) hours as needed for cramping (Patient not taking: Reported on 11/9/2022)  0     No current facility-administered medications for this visit  Blood pressure 102/60, pulse 77, height 5' 6" (1 676 m), weight 79 8 kg (176 lb), SpO2 98 %, not currently breastfeeding  PHYSICAL EXAM: Physical Exam  Constitutional:       Appearance: Normal appearance  She is well-developed  HENT:      Head: Normocephalic and atraumatic  Nose: Nose normal    Eyes:      Conjunctiva/sclera: Conjunctivae normal    Neck:      Thyroid: No thyromegaly  Vascular: No JVD  Trachea: No tracheal deviation  Cardiovascular:      Rate and Rhythm: Normal rate and regular rhythm  Heart sounds: Normal heart sounds  No murmur heard  No friction rub  No gallop  Pulmonary:      Effort: Pulmonary effort is normal  No respiratory distress  Breath sounds: Normal breath sounds  No wheezing or rales  Abdominal:      General: Bowel sounds are normal  There is no distension  Palpations: Abdomen is soft  There is no mass  Tenderness: There is no abdominal tenderness  There is no guarding  Hernia: No hernia is present  Musculoskeletal:         General: No tenderness or deformity  Cervical back: Neck supple  Right lower leg: No edema  Left lower leg: No edema  Lymphadenopathy:      Cervical: No cervical adenopathy  Skin:     General: Skin is warm and dry  Findings: No erythema or rash  Neurological:      Mental Status: She is alert and oriented to person, place, and time  Psychiatric:         Mood and Affect: Mood normal          Behavior: Behavior normal          Thought Content:  Thought content normal           Lab Results   Component Value Date    WBC 8 4 06/07/2022    HGB 12 5 06/07/2022    HCT 37 7 06/07/2022    MCV 82 5 06/07/2022     (H) 06/07/2022     Lab Results   Component Value Date    CALCIUM 9 9 06/07/2022    K 4 8 06/07/2022    CO2 28 06/07/2022     06/07/2022    BUN 11 06/07/2022    CREATININE 0 71 06/07/2022     Lab Results   Component Value Date    ALT 15 06/07/2022    AST 19 06/07/2022    ALKPHOS 67 06/07/2022     No results found for: INR, PROTIME    No results found  ASSESSMENT & PLAN:    Gastroesophageal reflux disease without esophagitis  Gastroesophageal reflux disease - Patient has the symptoms of chronic acid reflux for a long time  Possible hiatal hernia or LES weakness  Should rule out Gonzalez's esophagus because of chronic symptoms  Patient reports having symptoms despite taking omeprazole 40 mg daily, probable developed some tolerance  Also consider functional symptoms from hypersensitive esophagus     -We will change omeprazole to pantoprazole 40 mg daily    -Patient was explained about the lifestyle and dietary modifications  Advised to avoid fatty foods, chocolates, caffeine, alcohol and any other triggering foods  Avoid eating for at least 3 hours before going to bed     -Discussed with patient in detail about the step up therapy with PPIs and also H2 blockers  If patient continues to have symptoms we can consider 24-hour pH monitoring     -Scheduled for EGD    -Patient was explained about  the risks and benefits of the procedure  Risks including but not limited to bleeding, infection, perforation were explained in detail  Also explained about less than 100% sensitivity with the exam and other alternatives

## 2023-01-20 NOTE — PATIENT INSTRUCTIONS
Scheduled date of EGD(as of today):  02/27/23  Physician performing EGD:  dr Wendy Rainey  Location of EGD:  [place] [id number]  Instructions reviewed with patient by: kael nogueira  Clearances:

## 2023-01-20 NOTE — ASSESSMENT & PLAN NOTE
Gastroesophageal reflux disease - Patient has the symptoms of chronic acid reflux for a long time  Possible hiatal hernia or LES weakness  Should rule out Gonzalez's esophagus because of chronic symptoms  Patient reports having symptoms despite taking omeprazole 40 mg daily, probable developed some tolerance  Also consider functional symptoms from hypersensitive esophagus     -We will change omeprazole to pantoprazole 40 mg daily    -Patient was explained about the lifestyle and dietary modifications  Advised to avoid fatty foods, chocolates, caffeine, alcohol and any other triggering foods  Avoid eating for at least 3 hours before going to bed     -Discussed with patient in detail about the step up therapy with PPIs and also H2 blockers  If patient continues to have symptoms we can consider 24-hour pH monitoring     -Scheduled for EGD    -Patient was explained about  the risks and benefits of the procedure  Risks including but not limited to bleeding, infection, perforation were explained in detail  Also explained about less than 100% sensitivity with the exam and other alternatives

## 2023-01-23 DIAGNOSIS — F34.1 DYSTHYMIC DISORDER: ICD-10-CM

## 2023-01-23 RX ORDER — BUPROPION HYDROCHLORIDE 150 MG/1
150 TABLET ORAL EVERY MORNING
Qty: 30 TABLET | Refills: 0 | Status: SHIPPED | OUTPATIENT
Start: 2023-01-23

## 2023-01-23 RX ORDER — PHENTERMINE HYDROCHLORIDE 15 MG/1
15 CAPSULE ORAL DAILY
Qty: 30 CAPSULE | Refills: 0 | Status: SHIPPED | OUTPATIENT
Start: 2023-01-23

## 2023-02-02 ENCOUNTER — CONSULT (OUTPATIENT)
Dept: SURGICAL ONCOLOGY | Facility: CLINIC | Age: 38
End: 2023-02-02

## 2023-02-02 ENCOUNTER — TELEPHONE (OUTPATIENT)
Dept: HEMATOLOGY ONCOLOGY | Facility: CLINIC | Age: 38
End: 2023-02-02

## 2023-02-02 VITALS
SYSTOLIC BLOOD PRESSURE: 110 MMHG | HEIGHT: 66 IN | WEIGHT: 176 LBS | OXYGEN SATURATION: 99 % | HEART RATE: 85 BPM | DIASTOLIC BLOOD PRESSURE: 70 MMHG | RESPIRATION RATE: 16 BRPM | BODY MASS INDEX: 28.28 KG/M2 | TEMPERATURE: 98.9 F

## 2023-02-02 DIAGNOSIS — N60.09 CYST OF BREAST, UNSPECIFIED LATERALITY: Primary | ICD-10-CM

## 2023-02-02 DIAGNOSIS — N64.4 BREAST PAIN: ICD-10-CM

## 2023-02-02 PROBLEM — N60.02 BILATERAL BREAST CYSTS: Status: ACTIVE | Noted: 2023-02-02

## 2023-02-02 PROBLEM — N60.01 BILATERAL BREAST CYSTS: Status: RESOLVED | Noted: 2023-02-02 | Resolved: 2023-02-02

## 2023-02-02 PROBLEM — N60.01 BILATERAL BREAST CYSTS: Status: ACTIVE | Noted: 2023-02-02

## 2023-02-02 PROBLEM — N60.02 BILATERAL BREAST CYSTS: Status: RESOLVED | Noted: 2023-02-02 | Resolved: 2023-02-02

## 2023-02-02 NOTE — PROGRESS NOTES
Surgical Oncology Follow Up       [facility] SURGICAL ONCOLOGY [place] [address]    Dean Wise  1985  [id number]  [facility] SURGICAL ONCOLOGY [facility]  [id number]    Chief Complaint   Patient presents with   • Consult       Assessment/Plan:  1  Cyst of breast, unspecified laterality  - 2 month follow up  - Ambulatory Referral to Surgical Oncology  - US BREAST CYST ASPIRATION LEFT INITIAL; Future    2  Breast pain  - EPO, warm compress, decrease salt and caffeine intake      Discussion/Summary: Patient is a 26-year-old female presenting for a consult for bilateral breast cysts and breast pain  She was referred by her OB/GYN  She had complaints of bilateral breast pain and was sent for diagnostic mammogram and ultrasound on 7/29/2022 which revealed a few benign cysts, this was BI-RADS 2 category 3 density  At the 2 o'clock position 8 cm from the nipple of the left breast there is a 10 x 10 x 6 mm oval simple cyst   Of note since last year patient stated that she is not having breast pain/chest pain/epigastric pain which is all been worked up and she is looking for more answers and resolutions for discomfort  She states she has tried warm compress, supportive bra and decreasing caffeine and salt intake  On clinical exam patient's breasts are cystic and tender on palpation  At the 12 o'clock position of the left breast 4 cm from the nipple I was able to palpate a large cyst  I attempted to visualize this area with bedside u/s however the machine was not working  I have recommended she undergo a cyst aspiration at this time and we will get this scheduled today  I recommend she try a 2 month course of EPO  I will plan to see her back in 2 months for reassessment of pain  She was instructed to call with any questions or concerns prior to this time   All questions were answered today          History of Present Illness:     Oncology History    No history exists         -Interval History: Patient is a 25-year-old female presenting for bilateral breast cysts and pain  She had diagnostic imaging last July which revealed multiple benign cysts in bilateral breasts  She has complaints of breast pain/chest pain/epigastric discomfort over the past year and is looking for resolution  She is currently perimenopausal   She notes that breast pain is a 1 out of 10 and tender on palpation daily however around the time of when her menstrual cycle would be she is approximately 5 out of 10  She states she has been decreasing her salt and caffeine intake, using warm compress and wearing a supportive bra  She seems as though she has tried a lot of modalities to decrease the breast pain  She is also following with GI for epigastric discomfort  Patient denies strenuous lifting or overuse of the chest muscles  Her age of first period was 14years old  She has had 1 pregnancy and no live births  She is currently perimenopausal   She has never been diagnosed with cancer never taken hormone replacement therapy  She has no family history of breast cancer  She is is not of Ashkenazi Sabianist descent and she does not have any genetic testing completed  Review of Systems:  Review of Systems   Constitutional: Positive for fatigue  Negative for activity change, appetite change and unexpected weight change  Respiratory: Negative for cough and shortness of breath  Cardiovascular: Negative for chest pain  Gastrointestinal: Negative for abdominal pain, diarrhea, nausea and vomiting  Endocrine: Negative for heat intolerance  Musculoskeletal: Positive for back pain, neck pain and neck stiffness  Negative for arthralgias and myalgias  Skin: Negative for rash  Allergic/Immunologic: Positive for environmental allergies  Neurological: Negative for weakness and headaches     Hematological: Negative for adenopathy  Psychiatric/Behavioral: Positive for agitation and sleep disturbance  The patient is nervous/anxious  Patient Active Problem List   Diagnosis   • Anxiety   • Premenstrual syndrome   • Endometrial polyp   • Abnormal uterine bleeding   • S/P dilation and curettage   • Gastroesophageal reflux disease without esophagitis   • Mixed hyperlipidemia   • Body mass index 28 0-28 9, adult   • Menorrhagia with regular cycle   • COVID-19 virus detected   • Dysthymic disorder   • Bilateral breast cysts     Past Medical History:   Diagnosis Date   • Abnormal Pap smear of cervix    • Allergic rhinitis    • Anxiety    • Anxiety and depression    • Chronic post-traumatic stress disorder (PTSD)    • GERD (gastroesophageal reflux disease)    • HPV (human papilloma virus) infection    • Hx of colposcopy with cervical biopsy    • Hyperlipidemia    • Migraine      Past Surgical History:   Procedure Laterality Date   • COLPOSCOPY      with biopsy with endocervical currettage    • CONTRACEPTIVE CAPSULE REMOVAL      Last assessed: 4/1/2014   • FL HYSTEROSCOPY BX ENDOMETRIUM&/POLYPC W/WO D&C N/A 01/06/2020    Procedure: DILATATION AND CURETTAGE (D&C) WITH HYSTEROSCOPY Polypectomy;  Surgeon: Vikas Arreola MD;  [facility];  Service: Gynecology   • FL HYSTEROSCOPY BX ENDOMETRIUM&/POLYPC W/WO D&C N/A 08/11/2022    Procedure: DILATATION AND CURETTAGE (D&C) WITH HYSTEROSCOPY W/ Tyree Lockhart;  Surgeon: Mulu Purvis DO;  [facility];  Service: Gynecology   • FL HYSTEROSCOPY ENDOMETRIAL ABLATION N/A 08/11/2022    Procedure: ABLATION ENDOMETRIAL Tyree Mirivette;  Surgeon: Mulu Purvis DO;  [facility];  Service: Gynecology   • FL REMOVAL INTRAUTERINE DEVICE IUD N/A 08/11/2022    Procedure: REMOVAL OF INTRAUTERINE DEVICE (IUD);   Surgeon: Mulu Purvis DO;  [facility];  Service: Gynecology   • TONSILLECTOMY     • UPPER GASTROINTESTINAL ENDOSCOPY       Family History   Problem Relation Age of Onset   • Hyperlipidemia Mother    • Heart disease Mother    • Heart disease Father    • Hyperlipidemia Father    • No Known Problems Maternal Grandmother    • No Known Problems Maternal Grandfather    • No Known Problems Paternal Grandmother    • No Known Problems Paternal Grandfather    • Colon cancer Maternal Uncle 36   • Breast cancer Cousin 50   • Breast cancer Family    • Diabetes Family    • Heart disease Family    • Diabetes type II Family    • Hypertension Family    • No Known Problems Maternal Aunt    • No Known Problems Paternal Aunt      Social History     Socioeconomic History   • Marital status: /Civil Union     Spouse name: Not on file   • Number of children: Not on file   • Years of education: Not on file   • Highest education level: Not on file   Occupational History   • Occupation: Pharmacist    Tobacco Use   • Smoking status: Never   • Smokeless tobacco: Never   Vaping Use   • Vaping Use: Never used   Substance and Sexual Activity   • Alcohol use: Yes     Alcohol/week: 1 0 standard drink     Types: 1 Glasses of wine per week     Comment: occ   • Drug use: Yes     Frequency: 7 0 times per week     Types: Marijuana     Comment: medical marijuana    • Sexual activity: Yes     Partners: Male     Birth control/protection: I U D     Other Topics Concern   • Not on file   Social History Narrative    Home environment domestic violence - denied         Alcohol: Negative - As per [employer]      Social Determinants of Health     Financial Resource Strain: Not on file   Food Insecurity: Not on file   Transportation Needs: Not on file   Physical Activity: Not on file   Stress: Not on file   Social Connections: Not on file   Intimate Partner Violence: Not on file   Housing Stability: Not on file       Current Outpatient Medications:   •  ALPRAZolam (XANAX) 0 25 mg tablet, Take 1 tablet (0 25 mg total) by mouth daily at bedtime as needed for anxiety, Disp: 30 tablet, Rfl: 0  •  ascorbic acid (VITAMIN C) 500 mg tablet, Take 500 mg by mouth daily, Disp: , Rfl:   •  buPROPion (Wellbutrin XL) 150 mg 24 hr tablet, Take 1 tablet (150 mg total) by mouth every morning, Disp: 30 tablet, Rfl: 0  •  ibuprofen (MOTRIN) 200 mg tablet, Take 3 tablets (600 mg total) by mouth every 6 (six) hours as needed for cramping, Disp: , Rfl: 0  •  Multiple Vitamin (MULTI-VITAMIN DAILY) TABS, Take by mouth daily after breakfast, Disp: , Rfl:   •  pantoprazole (PROTONIX) 40 mg tablet, Take 1 tablet (40 mg total) by mouth daily, Disp: 30 tablet, Rfl: 11  •  phentermine 15 MG capsule, Take 1 capsule (15 mg total) by mouth daily, Disp: 30 capsule, Rfl: 0  •  Probiotic Product (PROBIOTIC-10) CAPS, Take by mouth daily , Disp: , Rfl:   •  sucralfate (CARAFATE) 1 g tablet, Take 1 tablet (1 g total) by mouth 4 (four) times a day, Disp: 120 tablet, Rfl: 1  No Known Allergies  Vitals:    02/02/23 0854   BP: 110/70   Pulse: 85   Resp: 16   Temp: 98 9 °F (37 2 °C)   SpO2: 99%       Physical Exam  Constitutional:       General: She is not in acute distress  Appearance: Normal appearance  Cardiovascular:      Rate and Rhythm: Normal rate and regular rhythm  Pulses: Normal pulses  Heart sounds: Normal heart sounds  Pulmonary:      Effort: Pulmonary effort is normal       Breath sounds: Normal breath sounds  Chest:      Chest wall: No mass  Breasts:     Right: Tenderness present  No swelling, bleeding, inverted nipple, mass, nipple discharge or skin change  Left: Tenderness present  No swelling, bleeding, inverted nipple, mass, nipple discharge or skin change  Comments: Palpable tender cyst at 12 oclock 4 cm from nipple of left breast  No masses, nodularity, skin changes, nipple changes or discharge, or adenopathy appreciated on physical exam      Abdominal:      General: Abdomen is flat  Palpations: Abdomen is soft     Lymphadenopathy:      Upper Body:      Right upper body: No supraclavicular, axillary or pectoral adenopathy  Left upper body: No supraclavicular, axillary or pectoral adenopathy  Skin:     General: Skin is warm  Neurological:      General: No focal deficit present  Mental Status: She is alert and oriented to person, place, and time  Psychiatric:         Mood and Affect: Mood normal          Behavior: Behavior normal            Results:    Imaging  No results found  I reviewed the above imaging data  Advance Care Planning/Advance Directives:  Discussed disease status, cancer treatment plans and/or cancer treatment goals with the patient

## 2023-02-02 NOTE — PROGRESS NOTES
Spoke with patient after James Wise made recommendation for;      _____ RIGHT ___x___LEFT      __x___Ultrasound guided cyst aspiration ______Stereotactic  Breast biopsy  __x___Verbalized understanding  Blood thinners:  _____yes __x___no    Date stopped: ___n/a________    Biopsy teaching sheet given:  _______yes __x____no    Verbal review of procedure completed & pt verbalized understanding

## 2023-02-02 NOTE — TELEPHONE ENCOUNTER
Scheduling Appointment SEND TO [facility]    Person calling in Patient     If other than patient calling, are they listed on the communication consent form? N/A   Doctor Kate Sharpe, [facility]   Location [facility]   Appointment date and time 04/06/23 8:30AM   Reason for scheduling appointment 2 month f/u   Patient verbalized understanding?   Yes

## 2023-02-26 RX ORDER — SODIUM CHLORIDE, SODIUM LACTATE, POTASSIUM CHLORIDE, CALCIUM CHLORIDE 600; 310; 30; 20 MG/100ML; MG/100ML; MG/100ML; MG/100ML
125 INJECTION, SOLUTION INTRAVENOUS CONTINUOUS
Status: CANCELLED | OUTPATIENT
Start: 2023-02-26

## 2023-02-26 RX ORDER — LIDOCAINE HYDROCHLORIDE 10 MG/ML
0.5 INJECTION, SOLUTION EPIDURAL; INFILTRATION; INTRACAUDAL; PERINEURAL ONCE AS NEEDED
Status: CANCELLED | OUTPATIENT
Start: 2023-02-26

## 2023-02-27 ENCOUNTER — HOSPITAL ENCOUNTER (OUTPATIENT)
Dept: GASTROENTEROLOGY | Facility: AMBULARY SURGERY CENTER | Age: 38
Setting detail: OUTPATIENT SURGERY
Discharge: HOME | End: 2023-02-27
Attending: INTERNAL MEDICINE

## 2023-02-27 ENCOUNTER — ANESTHESIA EVENT (OUTPATIENT)
Dept: GASTROENTEROLOGY | Facility: AMBULARY SURGERY CENTER | Age: 38
End: 2023-02-27

## 2023-02-27 ENCOUNTER — ANESTHESIA (OUTPATIENT)
Dept: GASTROENTEROLOGY | Facility: AMBULARY SURGERY CENTER | Age: 38
End: 2023-02-27

## 2023-02-27 VITALS
TEMPERATURE: 98 F | BODY MASS INDEX: 28.25 KG/M2 | WEIGHT: 175 LBS | RESPIRATION RATE: 16 BRPM | DIASTOLIC BLOOD PRESSURE: 64 MMHG | SYSTOLIC BLOOD PRESSURE: 98 MMHG | HEART RATE: 66 BPM | OXYGEN SATURATION: 100 %

## 2023-02-27 DIAGNOSIS — K21.9 GASTROESOPHAGEAL REFLUX DISEASE WITHOUT ESOPHAGITIS: ICD-10-CM

## 2023-02-27 PROBLEM — Z79.899 MEDICAL MARIJUANA USE: Status: ACTIVE | Noted: 2023-02-27

## 2023-02-27 LAB
EXT PREGNANCY TEST URINE: NEGATIVE
EXT. CONTROL: NORMAL

## 2023-02-27 RX ORDER — LIDOCAINE HYDROCHLORIDE 10 MG/ML
INJECTION, SOLUTION EPIDURAL; INFILTRATION; INTRACAUDAL; PERINEURAL AS NEEDED
Status: DISCONTINUED | OUTPATIENT
Start: 2023-02-27 | End: 2023-02-27

## 2023-02-27 RX ORDER — PROPOFOL 10 MG/ML
INJECTION, EMULSION INTRAVENOUS AS NEEDED
Status: DISCONTINUED | OUTPATIENT
Start: 2023-02-27 | End: 2023-02-27

## 2023-02-27 RX ORDER — SODIUM CHLORIDE, SODIUM LACTATE, POTASSIUM CHLORIDE, CALCIUM CHLORIDE 600; 310; 30; 20 MG/100ML; MG/100ML; MG/100ML; MG/100ML
125 INJECTION, SOLUTION INTRAVENOUS CONTINUOUS
Status: DISCONTINUED | OUTPATIENT
Start: 2023-02-27 | End: 2023-03-03 | Stop reason: HOSPADM

## 2023-02-27 RX ORDER — LIDOCAINE HYDROCHLORIDE 10 MG/ML
0.5 INJECTION, SOLUTION EPIDURAL; INFILTRATION; INTRACAUDAL; PERINEURAL ONCE AS NEEDED
Status: DISCONTINUED | OUTPATIENT
Start: 2023-02-27 | End: 2023-03-03 | Stop reason: HOSPADM

## 2023-02-27 RX ORDER — SODIUM CHLORIDE, SODIUM LACTATE, POTASSIUM CHLORIDE, CALCIUM CHLORIDE 600; 310; 30; 20 MG/100ML; MG/100ML; MG/100ML; MG/100ML
INJECTION, SOLUTION INTRAVENOUS CONTINUOUS PRN
Status: DISCONTINUED | OUTPATIENT
Start: 2023-02-27 | End: 2023-02-27

## 2023-02-27 RX ADMIN — PROPOFOL 60 MG: 10 INJECTION, EMULSION INTRAVENOUS at 10:51

## 2023-02-27 RX ADMIN — PROPOFOL 150 MG: 10 INJECTION, EMULSION INTRAVENOUS at 10:49

## 2023-02-27 RX ADMIN — SODIUM CHLORIDE, SODIUM LACTATE, POTASSIUM CHLORIDE, AND CALCIUM CHLORIDE: .6; .31; .03; .02 INJECTION, SOLUTION INTRAVENOUS at 10:40

## 2023-02-27 RX ADMIN — LIDOCAINE HYDROCHLORIDE 50 MG: 10 INJECTION, SOLUTION EPIDURAL; INFILTRATION; INTRACAUDAL; PERINEURAL at 10:49

## 2023-02-27 NOTE — ANESTHESIA PREPROCEDURE EVALUATION
Procedure:  EGD    Relevant Problems   ANESTHESIA (within normal limits)      CARDIO   (+) Breast pain   (+) Mixed hyperlipidemia      GI/HEPATIC   (+) Gastroesophageal reflux disease without esophagitis      NEURO/PSYCH   (+) Anxiety   (+) Dysthymic disorder      Other   (+) Medical marijuana use        Physical Exam    Airway    Mallampati score: I  TM Distance: >3 FB  Neck ROM: full     Dental   No notable dental hx     Cardiovascular      Pulmonary      Other Findings        Anesthesia Plan  ASA Score- 2     Anesthesia Type- IV sedation with anesthesia with ASA Monitors  Additional Monitors:   Airway Plan:           Plan Factors-Exercise tolerance (METS): >4 METS  Chart reviewed  Existing labs reviewed  Patient summary reviewed  Patient is a current smoker (Vapes medical marijuana only)  Patient did not smoke on day of surgery  Induction-     Postoperative Plan-     Informed Consent- Anesthetic plan and risks discussed with patient  I personally reviewed this patient with the CRNA  Discussed and agreed on the Anesthesia Plan with the CRNA  Pallavi Jones

## 2023-02-27 NOTE — ANESTHESIA POSTPROCEDURE EVALUATION
Post-Op Assessment Note    CV Status:  Stable  Pain Score: 0    Pain management: adequate     Mental Status:  Awake   Hydration Status:  Stable   PONV Controlled:  None   Airway Patency:  Patent      Post Op Vitals Reviewed: Yes      Staff: CRNA         No notable events documented      BP   123/64   Temp      Pulse  70   Resp   12   SpO2   99

## 2023-02-27 NOTE — H&P
History and Physical - [facility]  Saeid Edwards 44 y o  female MRN: [id number]        HPI: 78-year-old female with history of GERD reports feeling better since omeprazole was changed to pantoprazole  Denies any difficulty swallowing  Historical Information   Past Medical History:   Diagnosis Date   • Abnormal Pap smear of cervix    • Allergic rhinitis    • Anxiety    • Anxiety and depression    • Chronic post-traumatic stress disorder (PTSD)    • GERD (gastroesophageal reflux disease)    • HPV (human papilloma virus) infection    • Hx of colposcopy with cervical biopsy    • Hyperlipidemia    • Migraine      Past Surgical History:   Procedure Laterality Date   • COLPOSCOPY      with biopsy with endocervical currettage    • CONTRACEPTIVE CAPSULE REMOVAL      Last assessed: 4/1/2014   • MD HYSTEROSCOPY BX ENDOMETRIUM&/POLYPC W/WO D&C N/A 01/06/2020    Procedure: DILATATION AND CURETTAGE (D&C) WITH HYSTEROSCOPY Polypectomy;  Surgeon: Angie Thomas MD;  [facility];  Service: Gynecology   • MD HYSTEROSCOPY BX ENDOMETRIUM&/POLYPC W/WO D&C N/A 08/11/2022    Procedure: DILATATION AND CURETTAGE (D&C) WITH HYSTEROSCOPY W/ Austin Natalie;  Surgeon: Violetta Elliott DO;  [facility];  Service: Gynecology   • MD HYSTEROSCOPY ENDOMETRIAL ABLATION N/A 08/11/2022    Procedure: ABLATION ENDOMETRIAL Austin Natalie;  Surgeon: Violetta Elliott DO;  [facility];  Service: Gynecology   • MD REMOVAL INTRAUTERINE DEVICE IUD N/A 08/11/2022    Procedure: REMOVAL OF INTRAUTERINE DEVICE (IUD);   Surgeon: Violetta Elliott DO;  [facility];  Service: Gynecology   • TONSILLECTOMY     • UPPER GASTROINTESTINAL ENDOSCOPY       Social History   Social History     Substance and Sexual Activity   Alcohol Use Yes   • Alcohol/week: 1 0 standard drink   • Types: 1 Glasses of wine per week    Comment: occ     Social History     Substance and Sexual Activity   Drug Use Yes   • Frequency: 7 0 times per week   • Types: Marijuana    Comment: medical marijuana      Social History     Tobacco Use   Smoking Status Never   Smokeless Tobacco Never     Family History   Problem Relation Age of Onset   • Hyperlipidemia Mother    • Heart disease Mother    • Heart disease Father    • Hyperlipidemia Father    • No Known Problems Maternal Aunt    • Colon cancer Maternal Uncle 29   • No Known Problems Paternal Aunt    • No Known Problems Maternal Grandmother    • No Known Problems Maternal Grandfather    • No Known Problems Paternal Grandmother    • No Known Problems Paternal Grandfather    • Breast cancer Cousin 50   • Breast cancer Family    • Diabetes Family    • Heart disease Family    • Diabetes type II Family    • Hypertension Family        Meds/Allergies     (Not in a hospital admission)      No Known Allergies    Objective     Blood pressure 107/68, pulse 67, temperature 98 °F (36 7 °C), temperature source Temporal, resp  rate 16, weight 79 4 kg (175 lb), SpO2 98 %, not currently breastfeeding      PHYSICAL EXAM:    Gen: NAD  CV: S1 & S2 normal, RRR  CHEST: Clear to auscultate  ABD: soft, NT/ND, good bowel sounds  EXT: no edema    ASSESSMENT:     History of GERD    PLAN:    EGD

## 2023-03-01 ENCOUNTER — RA CDI HCC (OUTPATIENT)
Dept: OTHER | Facility: HOSPITAL | Age: 38
End: 2023-03-01

## 2023-03-01 ENCOUNTER — HOSPITAL ENCOUNTER (OUTPATIENT)
Dept: ULTRASOUND IMAGING | Facility: CLINIC | Age: 38
Discharge: HOME | End: 2023-03-01

## 2023-03-01 ENCOUNTER — HOSPITAL ENCOUNTER (OUTPATIENT)
Dept: MAMMOGRAPHY | Facility: CLINIC | Age: 38
Discharge: HOME | End: 2023-03-01

## 2023-03-01 VITALS — BODY MASS INDEX: 28.12 KG/M2 | HEIGHT: 66 IN | WEIGHT: 175 LBS

## 2023-03-01 DIAGNOSIS — N63.0 BREAST LUMP: ICD-10-CM

## 2023-03-01 DIAGNOSIS — N60.09 CYST OF BREAST, UNSPECIFIED LATERALITY: ICD-10-CM

## 2023-03-01 RX ORDER — LIDOCAINE HYDROCHLORIDE 10 MG/ML
5 INJECTION, SOLUTION EPIDURAL; INFILTRATION; INTRACAUDAL; PERINEURAL ONCE
Status: COMPLETED | OUTPATIENT
Start: 2023-03-01 | End: 2023-03-01

## 2023-03-01 RX ADMIN — LIDOCAINE HYDROCHLORIDE 5 ML: 10 INJECTION, SOLUTION EPIDURAL; INFILTRATION; INTRACAUDAL; PERINEURAL at 16:05

## 2023-03-01 NOTE — PROGRESS NOTES
[facility] [place] [facility]  [facility]       Chart reviewed, no opportunity found: CHART REVIEWED, NO OPPORTUNITY FOUND        Patients Insurance        Commercial Insurance: [address] [place] [address]

## 2023-03-02 VITALS — DIASTOLIC BLOOD PRESSURE: 60 MMHG | SYSTOLIC BLOOD PRESSURE: 110 MMHG | HEART RATE: 64 BPM

## 2023-03-02 NOTE — PROGRESS NOTES
Procedure type:    __x___ultrasound guided cyst aspiration _____stereotactic    Breast:    __x___Left _____Right    Location: 1:00 retroareolar    Needle: 25g injection needle    # of passes: 1 - 1cc blood tinged fluid-discarded per Dr Solano Hides: none    Performed by: Dr Karly Seay held for 5 minutes by: Carlos Yi    Stervamsi Strips:    _____yes __x___no    Band aid:    __x___yes_____no    Tape and guaze:    _____yes __x___no    Tolerated procedure:    __x___yes _____no

## 2023-03-02 NOTE — PROGRESS NOTES
Ice pack given:    __x___yes _____no    Discharge instructions reviewed & given to patient:    __x___yes _____no    Discharged via:    __x___ambulatory    _____wheelchair    _____stretcher    Stable on discharge:    __x___yes ____no

## 2023-03-03 NOTE — PROGRESS NOTES
Post procedure call completed    Bleeding: _____yes __X___no (pt denies)    Pain: _____yes ___X___no (pt reports mild soreness with movement, using ice pack, denies OTC medication use)    Redness/Swelling: ______yes ___X___no (pt denies)    Band aid removed: __X___yes ____no (removed 3/2/23)    Pt with no questions at this time, adv to call with any questions or concerns, has name/# for contact

## 2023-03-06 ENCOUNTER — TELEPHONE (OUTPATIENT)
Dept: GASTROENTEROLOGY | Facility: AMBULARY SURGERY CENTER | Age: 38
End: 2023-03-06

## 2023-03-06 NOTE — TELEPHONE ENCOUNTER
informing patient apt has been made for 05/02 f/u as per dr Marquise Guthriet request   if not conveneient please call back to make a new apt

## 2023-03-06 NOTE — TELEPHONE ENCOUNTER
----- Message from Deepthi Arriaza MD sent at 3/2/2023  4:34 PM EST -----  Schedule office visit in 1-2 months please

## 2023-03-15 ENCOUNTER — OFFICE VISIT (OUTPATIENT)
Dept: INTERNAL MEDICINE CLINIC | Facility: CLINIC | Age: 38
End: 2023-03-15

## 2023-03-15 VITALS
WEIGHT: 171 LBS | HEART RATE: 70 BPM | OXYGEN SATURATION: 99 % | TEMPERATURE: 98.1 F | DIASTOLIC BLOOD PRESSURE: 70 MMHG | SYSTOLIC BLOOD PRESSURE: 128 MMHG | HEIGHT: 66 IN | BODY MASS INDEX: 27.48 KG/M2

## 2023-03-15 DIAGNOSIS — R73.01 IMPAIRED FASTING BLOOD SUGAR: ICD-10-CM

## 2023-03-15 DIAGNOSIS — Z00.00 ANNUAL PHYSICAL EXAM: Primary | ICD-10-CM

## 2023-03-15 DIAGNOSIS — E78.2 MIXED HYPERLIPIDEMIA: ICD-10-CM

## 2023-03-15 DIAGNOSIS — K21.9 GASTROESOPHAGEAL REFLUX DISEASE WITHOUT ESOPHAGITIS: ICD-10-CM

## 2023-03-15 DIAGNOSIS — F34.1 DYSTHYMIC DISORDER: ICD-10-CM

## 2023-03-15 NOTE — PROGRESS NOTES
ADULT ANNUAL [facility] INTERNAL MEDICINE    NAME: David Montgomery  AGE: 44 y o  SEX: female  : 1985     DATE: 3/15/2023     Assessment and Plan:     Problem List Items Addressed This Visit        Digestive    Gastroesophageal reflux disease without esophagitis       Endocrine    Impaired fasting blood sugar    Relevant Orders    CBC and differential    Hemoglobin A1C       Other    Annual physical exam - Primary    Mixed hyperlipidemia    Relevant Orders    Comprehensive metabolic panel    Lipid Panel with Direct LDL reflex    TSH, 3rd generation    Dysthymic disorder       Immunizations and preventive care screenings were discussed with patient today  Appropriate education was printed on patient's after visit summary  Counseling:  Exercise: the importance of regular exercise/physical activity was discussed  Recommend exercise 3-5 times per week for at least 30 minutes  Return in about 6 months (around 9/15/2023)  Chief Complaint:     Chief Complaint   Patient presents with   • Physical Exam      History of Present Illness:     Adult Annual Physical   Patient here for a comprehensive physical exam  The patient reports no problems  Diet and Physical Activity  Diet/Nutrition: well balanced diet  Exercise: moderate cardiovascular exercise        Depression Screening  PHQ-2/9 Depression Screening    Little interest or pleasure in doing things: 1 - several days  Feeling down, depressed, or hopeless: 1 - several days  Trouble falling or staying asleep, or sleeping too much: 1 - several days  Feeling tired or having little energy: 1 - several days  Poor appetite or overeatin - several days  Feeling bad about yourself - or that you are a failure or have let yourself or your family down: 2 - more than half the days  Trouble concentrating on things, such as reading the newspaper or watching television: 1 - several days  Moving or speaking so slowly that other people could have noticed  Or the opposite - being so fidgety or restless that you have been moving around a lot more than usual: 0 - not at all  Thoughts that you would be better off dead, or of hurting yourself in some way: 0 - not at all  PHQ-9 Score: 8   PHQ-9 Interpretation: Mild depression        General Health  Sleep: sleeps well  Hearing: normal - bilateral   Vision: no vision problems  Dental: regular dental visits  /GYN Health  Last menstrual period: -  Contraceptive method: -  History of STDs?: no      Review of Systems:     Review of Systems   Constitutional: Negative for chills and fever  HENT: Negative for congestion, ear pain and sore throat  Eyes: Negative for pain  Respiratory: Negative for cough and shortness of breath  Cardiovascular: Negative for chest pain and leg swelling  Gastrointestinal: Negative for abdominal pain, nausea and vomiting  Endocrine: Negative for polyuria  Genitourinary: Negative for difficulty urinating, frequency and urgency  Musculoskeletal: Positive for back pain  Negative for arthralgias  Skin: Negative for rash  Neurological: Negative for weakness and headaches  Psychiatric/Behavioral: Negative for sleep disturbance  The patient is not nervous/anxious         Past Medical History:     Past Medical History:   Diagnosis Date   • Abnormal Pap smear of cervix    • Allergic rhinitis    • Anxiety    • Anxiety and depression    • Chronic post-traumatic stress disorder (PTSD)    • GERD (gastroesophageal reflux disease)    • HPV (human papilloma virus) infection    • Hx of colposcopy with cervical biopsy    • Hyperlipidemia    • Migraine       Past Surgical History:     Past Surgical History:   Procedure Laterality Date   • COLPOSCOPY      with biopsy with endocervical currettage    • CONTRACEPTIVE CAPSULE REMOVAL      Last assessed: 4/1/2014   • [place] HYSTEROSCOPY BX ENDOMETRIUM&/POLYPC W/WO D&C N/A 01/06/2020 Procedure: DILATATION AND CURETTAGE (D&C) WITH HYSTEROSCOPY Polypectomy;  Surgeon: Bharati Ramos MD;  [facility];  Service: Gynecology   • WI HYSTEROSCOPY BX ENDOMETRIUM&/POLYPC W/WO D&C N/A 08/11/2022    Procedure: DILATATION AND CURETTAGE (D&C) WITH HYSTEROSCOPY W/ Waqas Plush;  Surgeon: Preeti Estrada DO;  [facility];  Service: Gynecology   • WI HYSTEROSCOPY ENDOMETRIAL ABLATION N/A 08/11/2022    Procedure: ABLATION ENDOMETRIAL Waqas Plush;  Surgeon: Preeti Estrada DO;  [facility];  Service: Gynecology   • WI REMOVAL INTRAUTERINE DEVICE IUD N/A 08/11/2022    Procedure: REMOVAL OF INTRAUTERINE DEVICE (IUD); Surgeon: Preeti Estrada DO;  [facility];  Service: Gynecology   • TONSILLECTOMY     • UPPER GASTROINTESTINAL ENDOSCOPY     • US BREAST CYST ASPIRATION LEFT INITIAL Left 3/1/2023      Social History:     Social History     Socioeconomic History   • Marital status: /Civil Union     Spouse name: None   • Number of children: None   • Years of education: None   • Highest education level: None   Occupational History   • Occupation: Pharmacist    Tobacco Use   • Smoking status: Never   • Smokeless tobacco: Never   Vaping Use   • Vaping Use: Never used   Substance and Sexual Activity   • Alcohol use: Yes     Alcohol/week: 1 0 standard drink     Types: 1 Glasses of wine per week     Comment: occ   • Drug use: Yes     Frequency: 7 0 times per week     Types: Marijuana     Comment: medical marijuana    • Sexual activity: Yes     Partners: Male     Birth control/protection: I U D     Other Topics Concern   • None   Social History Narrative    Home environment domestic violence - denied         Alcohol: Negative - As per [employer]      Social Determinants of Health     Financial Resource Strain: Not on file   Food Insecurity: Not on file   Transportation Needs: Not on file   Physical Activity: Not on file   Stress: Not on file   Social Connections: Not on file Intimate Partner Violence: Not on file   Housing Stability: Not on file      Family History:     Family History   Problem Relation Age of Onset   • Hyperlipidemia Mother    • Heart disease Mother    • Heart disease Father    • Hyperlipidemia Father    • No Known Problems Maternal Grandmother    • No Known Problems Maternal Grandfather    • No Known Problems Paternal Grandmother    • No Known Problems Paternal Grandfather    • No Known Problems Maternal Aunt    • Colon cancer Maternal Uncle 29   • No Known Problems Paternal Aunt    • Breast cancer Cousin 50      Current Medications:     Current Outpatient Medications   Medication Sig Dispense Refill   • ALPRAZolam (XANAX) 0 25 mg tablet Take 1 tablet (0 25 mg total) by mouth daily at bedtime as needed for anxiety 30 tablet 0   • ascorbic acid (VITAMIN C) 500 mg tablet Take 500 mg by mouth daily     • BL EVENING PRIMROSE OIL PO Take by mouth     • ibuprofen (MOTRIN) 200 mg tablet Take 3 tablets (600 mg total) by mouth every 6 (six) hours as needed for cramping  0   • Multiple Vitamin (MULTI-VITAMIN DAILY) TABS Take by mouth daily after breakfast     • pantoprazole (PROTONIX) 40 mg tablet Take 1 tablet (40 mg total) by mouth daily 30 tablet 11   • phentermine 15 MG capsule Take 1 capsule (15 mg total) by mouth daily 30 capsule 0   • Probiotic Product (PROBIOTIC-10) CAPS Take by mouth daily      • sucralfate (CARAFATE) 1 g tablet Take 1 tablet (1 g total) by mouth 4 (four) times a day (Patient taking differently: Take 1 g by mouth 4 (four) times a day as needed) 120 tablet 1   • vitamin E, tocopherol, 400 units capsule Take 400 Units by mouth daily     • buPROPion (Wellbutrin XL) 150 mg 24 hr tablet Take 1 tablet (150 mg total) by mouth every morning (Patient not taking: Reported on 3/15/2023) 30 tablet 3     No current facility-administered medications for this visit        Allergies:     No Known Allergies   Physical Exam:     /70 (BP Location: Left arm, Patient Position: Sitting, Cuff Size: Standard)   Pulse 70   Temp 98 1 °F (36 7 °C) (Temporal)   Ht 5' 6" (1 676 m)   Wt 77 6 kg (171 lb)   SpO2 99%   BMI 27 60 kg/m²     Physical Exam  Vitals and nursing note reviewed  Constitutional:       General: She is not in acute distress  Appearance: She is well-developed  HENT:      Head: Normocephalic and atraumatic  Right Ear: Tympanic membrane, ear canal and external ear normal       Left Ear: Tympanic membrane, ear canal and external ear normal       Mouth/Throat:      Pharynx: Oropharynx is clear  Eyes:      Extraocular Movements: Extraocular movements intact  Conjunctiva/sclera: Conjunctivae normal    Cardiovascular:      Rate and Rhythm: Normal rate and regular rhythm  Heart sounds: Normal heart sounds  No murmur heard  Pulmonary:      Effort: Pulmonary effort is normal  No respiratory distress  Breath sounds: Normal breath sounds  Abdominal:      General: Abdomen is flat  Palpations: Abdomen is soft  Tenderness: There is no abdominal tenderness  Musculoskeletal:         General: No swelling  Cervical back: Normal range of motion and neck supple  Right lower leg: No edema  Left lower leg: No edema  Skin:     General: Skin is warm and dry  Capillary Refill: Capillary refill takes less than 2 seconds  Neurological:      General: No focal deficit present  Mental Status: She is alert and oriented to person, place, and time     Psychiatric:         Mood and Affect: Mood normal           Jayesh Dill MD   [facility] INTERNAL MEDICINE

## 2023-03-17 DIAGNOSIS — K21.9 GASTROESOPHAGEAL REFLUX DISEASE WITHOUT ESOPHAGITIS: ICD-10-CM

## 2023-03-17 RX ORDER — PANTOPRAZOLE SODIUM 40 MG/1
TABLET, DELAYED RELEASE ORAL
Qty: 90 TABLET | Refills: 4 | Status: SHIPPED | OUTPATIENT
Start: 2023-03-17

## 2023-04-05 RX ORDER — PHENTERMINE HYDROCHLORIDE 15 MG/1
15 CAPSULE ORAL DAILY
Qty: 30 CAPSULE | Refills: 0 | Status: SHIPPED | OUTPATIENT
Start: 2023-04-05

## 2023-04-06 ENCOUNTER — OFFICE VISIT (OUTPATIENT)
Dept: SURGICAL ONCOLOGY | Facility: CLINIC | Age: 38
End: 2023-04-06

## 2023-04-06 VITALS
SYSTOLIC BLOOD PRESSURE: 122 MMHG | HEIGHT: 66 IN | DIASTOLIC BLOOD PRESSURE: 82 MMHG | RESPIRATION RATE: 16 BRPM | TEMPERATURE: 98.5 F | BODY MASS INDEX: 27.8 KG/M2 | WEIGHT: 173 LBS

## 2023-04-06 DIAGNOSIS — N64.4 BREAST PAIN: Primary | ICD-10-CM

## 2023-04-06 NOTE — PROGRESS NOTES
Surgical Oncology Follow Up       [facility] SURGICAL ONCOLOGY [place]  [facility] [id number]    [place]  1985  [id number]  [facility] SURGICAL ONCOLOGY [facility]  [address] [id number]    Chief Complaint   Patient presents with   • Follow-up       Assessment/Plan:  1  Breast pain  - PRN       Discussion/Summary: Patient is a 69-year-old female presenting today for a 2-month follow-up for breast pain secondary to breast cysts  At her last visit I recommended EPO, warm compress, supportive bra, reducing caffeine and salt intake and Aleve as needed  I sent her for a diagnostic ultrasound and mammogram as well as a cyst aspiration  This performed on 3/1/2023  Her mammogram was BI-RADS 2 category 3 density  There was a 10 mm minimally complicated cyst at the 1 o'clock position at the area of palpable concern  She underwent cyst aspiration at that time which was successful  Radiologist recommend she begin routine mammogram screening at the age of 35 for bilateral breast  There were no concerns on her clinical breast exam   Patient stated that her breast pain has much improved  She said it is still there but is not as severe as it was  She states she has been taking evening primrose oil, vitamin E, using warm compress and bought different bras  I informed her that she may continue to follow with her GYN annually and begin mammograms at 35  I instructed her to call in the future with any concerns or worsening breast pain  Patient understood and was appreciative  History of Present Illness:     Oncology History    No history exists         -Interval History: Patient is a 69-year-old female presenting today for a 2-month follow-up for breast pain secondary to breast cysts  She had a mammogram on 3/1 which was BI-RADS 2 category 3 density   She underwent cyst aspiration at that time which was successful  She notes improved breast pain  She denies further breast changes  Review of Systems:  Review of Systems   Constitutional: Negative for activity change, appetite change, fatigue and unexpected weight change  Respiratory: Negative for cough and shortness of breath  Cardiovascular: Negative for chest pain  Gastrointestinal: Negative for abdominal pain, diarrhea, nausea and vomiting  Endocrine: Negative for heat intolerance  Musculoskeletal: Negative for arthralgias, back pain and myalgias  Skin: Negative for rash  Neurological: Negative for weakness and headaches  Hematological: Negative for adenopathy         Patient Active Problem List   Diagnosis   • Anxiety   • Premenstrual syndrome   • Endometrial polyp   • Abnormal uterine bleeding   • S/P dilation and curettage   • Annual physical exam   • Gastroesophageal reflux disease without esophagitis   • Mixed hyperlipidemia   • Body mass index 28 0-28 9, adult   • Menorrhagia with regular cycle   • COVID-19 virus detected   • Dysthymic disorder   • Breast pain   • Medical marijuana use   • Impaired fasting blood sugar     Past Medical History:   Diagnosis Date   • Abnormal Pap smear of cervix    • Allergic rhinitis    • Anxiety    • Anxiety and depression    • Chronic post-traumatic stress disorder (PTSD)    • GERD (gastroesophageal reflux disease)    • HPV (human papilloma virus) infection    • Hx of colposcopy with cervical biopsy    • Hyperlipidemia    • Migraine      Past Surgical History:   Procedure Laterality Date   • COLPOSCOPY      with biopsy with endocervical currettage    • CONTRACEPTIVE CAPSULE REMOVAL      Last assessed: 4/1/2014   • [place] HYSTEROSCOPY BX ENDOMETRIUM&/POLYPC W/WO D&C N/A 01/06/2020    Procedure: DILATATION AND CURETTAGE (D&C) WITH HYSTEROSCOPY Polypectomy;  Surgeon: Meg Chung MD;  [facility];  Service: Gynecology   • [place] HYSTEROSCOPY BX ENDOMETRIUM&/POLYPC W/WO D&C N/A 08/11/2022    Procedure: DILATATION AND CURETTAGE (D&C) WITH HYSTEROSCOPY W/ Mat Freeze;  Surgeon: Salvatore Toribio DO;  [facility];  Service: Gynecology   • [place] HYSTEROSCOPY ENDOMETRIAL ABLATION N/A 08/11/2022    Procedure: ABLATION ENDOMETRIAL Mat Freeze;  Surgeon: Salvatore Toribio DO;  [facility];  Service: Gynecology   • [place] REMOVAL INTRAUTERINE DEVICE IUD N/A 08/11/2022    Procedure: REMOVAL OF INTRAUTERINE DEVICE (IUD); Surgeon: Salvatore Toribio DO;  [facility];  Service: Gynecology   • TONSILLECTOMY     • UPPER GASTROINTESTINAL ENDOSCOPY     • US BREAST CYST ASPIRATION LEFT INITIAL Left 3/1/2023     Family History   Problem Relation Age of Onset   • Hyperlipidemia Mother    • Heart disease Mother    • Heart disease Father    • Hyperlipidemia Father    • No Known Problems Maternal Grandmother    • No Known Problems Maternal Grandfather    • No Known Problems Paternal Grandmother    • No Known Problems Paternal Grandfather    • No Known Problems Maternal Aunt    • Colon cancer Maternal Uncle 29   • No Known Problems Paternal Aunt    • Breast cancer Cousin 50     Social History     Socioeconomic History   • Marital status: /Civil Union     Spouse name: Not on file   • Number of children: Not on file   • Years of education: Not on file   • Highest education level: Not on file   Occupational History   • Occupation: Pharmacist    Tobacco Use   • Smoking status: Never   • Smokeless tobacco: Never   Vaping Use   • Vaping Use: Never used   Substance and Sexual Activity   • Alcohol use: Yes     Alcohol/week: 1 0 standard drink     Types: 1 Glasses of wine per week     Comment: occ   • Drug use: Yes     Frequency: 7 0 times per week     Types: Marijuana     Comment: medical marijuana    • Sexual activity: Yes     Partners: Male     Birth control/protection: I U D     Other Topics Concern   • Not on file   Social History Narrative    Home environment domestic violence - denied         Alcohol: Negative - As per [employer]      Social Determinants of Health     Financial Resource Strain: Not on file   Food Insecurity: Not on file   Transportation Needs: Not on file   Physical Activity: Not on file   Stress: Not on file   Social Connections: Not on file   Intimate Partner Violence: Not on file   Housing Stability: Not on file       Current Outpatient Medications:   •  ALPRAZolam (XANAX) 0 25 mg tablet, Take 1 tablet (0 25 mg total) by mouth daily at bedtime as needed for anxiety, Disp: 30 tablet, Rfl: 0  •  ascorbic acid (VITAMIN C) 500 mg tablet, Take 500 mg by mouth daily, Disp: , Rfl:   •  BL EVENING PRIMROSE OIL PO, Take by mouth, Disp: , Rfl:   •  ibuprofen (MOTRIN) 200 mg tablet, Take 3 tablets (600 mg total) by mouth every 6 (six) hours as needed for cramping, Disp: , Rfl: 0  •  Multiple Vitamin (MULTI-VITAMIN DAILY) TABS, Take by mouth daily after breakfast, Disp: , Rfl:   •  pantoprazole (PROTONIX) 40 mg tablet, TAKE 1 TABLET BY MOUTH EVERY DAY, Disp: 90 tablet, Rfl: 4  •  phentermine 15 MG capsule, Take 1 capsule (15 mg total) by mouth daily, Disp: 30 capsule, Rfl: 0  •  Probiotic Product (PROBIOTIC-10) CAPS, Take by mouth daily , Disp: , Rfl:   •  sucralfate (CARAFATE) 1 g tablet, Take 1 tablet (1 g total) by mouth 4 (four) times a day (Patient taking differently: Take 1 g by mouth 4 (four) times a day as needed), Disp: 120 tablet, Rfl: 1  •  vitamin E, tocopherol, 400 units capsule, Take 400 Units by mouth daily, Disp: , Rfl:   No Known Allergies  Vitals:    04/06/23 0825   BP: 122/82   Resp: 16   Temp: 98 5 °F (36 9 °C)       Physical Exam  Constitutional:       General: She is not in acute distress  Appearance: Normal appearance  Cardiovascular:      Rate and Rhythm: Normal rate and regular rhythm  Pulses: Normal pulses  Heart sounds: Normal heart sounds  Pulmonary:      Effort: Pulmonary effort is normal       Breath sounds: Normal breath sounds  Chest:      Chest wall: No mass  Breasts:     Right: No swelling, bleeding, inverted nipple, mass, nipple discharge, skin change or tenderness  Left: No swelling, bleeding, inverted nipple, mass, nipple discharge, skin change or tenderness  Abdominal:      General: Abdomen is flat  Palpations: Abdomen is soft  Lymphadenopathy:      Upper Body:      Right upper body: No supraclavicular, axillary or pectoral adenopathy  Left upper body: No supraclavicular, axillary or pectoral adenopathy  Skin:     General: Skin is warm  Neurological:      General: No focal deficit present  Mental Status: She is alert and oriented to person, place, and time  Psychiatric:         Mood and Affect: Mood normal          Behavior: Behavior normal            Results:    Imaging  No results found  I reviewed the above imaging data  Advance Care Planning/Advance Directives:  Discussed disease status, cancer treatment plans and/or cancer treatment goals with the patient

## 2023-05-02 ENCOUNTER — OFFICE VISIT (OUTPATIENT)
Dept: GASTROENTEROLOGY | Facility: AMBULARY SURGERY CENTER | Age: 38
End: 2023-05-02

## 2023-05-02 VITALS
BODY MASS INDEX: 23.62 KG/M2 | DIASTOLIC BLOOD PRESSURE: 72 MMHG | SYSTOLIC BLOOD PRESSURE: 110 MMHG | HEIGHT: 72 IN | WEIGHT: 174.4 LBS | HEART RATE: 78 BPM | OXYGEN SATURATION: 100 %

## 2023-05-02 DIAGNOSIS — K21.9 GASTROESOPHAGEAL REFLUX DISEASE WITHOUT ESOPHAGITIS: Primary | ICD-10-CM

## 2023-05-02 NOTE — PROGRESS NOTES
Follow-up Note -  [facility]  Angie Ayush 1985 44 y o  female         Reason: Follow-up; GERD    HPI: 39-year-old female with history of anxiety and depression, PTSD, GERD who presents for follow-up, she was seen in our office with Dr Elli Abdalla a few months ago in January with complaint of worsening GERD symptoms, had actually been seen in the hospital with atypical chest pain in May 2022, at the time of office visit reporting no significant benefit with omeprazole 40 mg daily  Her PPI was changed to pantoprazole 40 mg daily and she was planned for EGD, which was carried out 2/27/2023, which appeared unremarkable  Esophageal biopsies were also negative for any findings of EOE or Gonzalez's  She was recommended to follow-up in a couple of months, hence the present visit  At this time, the patient says that most of her GERD symptoms have largely improved, particularly the episodes of chest pain which seem to have resolved entirely  Still has occasional indigestion, nausea, gassiness, which she reports are not highly disruptive for her, and she suspects these may be hormonally mediated - she has history of endometrial ablation but she believes her symptoms correlate to some degree with where her menstrual cycles would normally be  Otherwise she denies any unintentional weight loss, reports her appetite has been good, denies any swallowing difficulty or swallowing discomfort, denies any disturbances to her bowel habits, any blood or mucus in her stools  She says her uncle had colon cancer  She does not smoke, drinks alcohol moderately 2 to 3 days a week  REVIEW OF SYSTEMS:      CONSTITUTIONAL: Denies any fever, chills, or rigors  Good appetite, and no recent weight loss  HEENT: No earache or tinnitus  Denies hearing loss or visual disturbances  CARDIOVASCULAR: No chest pain or palpitations     RESPIRATORY: Denies any cough, hemoptysis, shortness of breath or dyspnea on exertion  GASTROINTESTINAL: As noted in the History of Present Illness  GENITOURINARY: No problems with urination  Denies any hematuria or dysuria  NEUROLOGIC: No dizziness or vertigo, denies headaches  MUSCULOSKELETAL: Denies any muscle or joint pain  SKIN: Denies skin rashes or itching  ENDOCRINE: Denies excessive thirst  Denies intolerance to heat or cold  PSYCHOSOCIAL: Denies depression or anxiety  Denies any recent memory loss  Past Medical History:   Diagnosis Date    Abnormal Pap smear of cervix     Allergic rhinitis     Anxiety     Anxiety and depression     Chronic post-traumatic stress disorder (PTSD)     GERD (gastroesophageal reflux disease)     HPV (human papilloma virus) infection     Hx of colposcopy with cervical biopsy     Hyperlipidemia     Migraine       Past Surgical History:   Procedure Laterality Date    COLPOSCOPY      with biopsy with endocervical currettage     CONTRACEPTIVE CAPSULE REMOVAL      Last assessed: 4/1/2014    NE HYSTEROSCOPY BX ENDOMETRIUM&/POLYPC W/WO D&C N/A 01/06/2020    Procedure: DILATATION AND CURETTAGE (D&C) WITH HYSTEROSCOPY Polypectomy;  Surgeon: Dave Powers MD;  [facility];  Service: Gynecology    NE HYSTEROSCOPY BX ENDOMETRIUM&/POLYPC W/WO D&C N/A 08/11/2022    Procedure: DILATATION AND CURETTAGE (D&C) WITH HYSTEROSCOPY W/ Oj Carrasquillo;  Surgeon: Rodríguez Franks DO;  [facility];  Service: Gynecology    NE HYSTEROSCOPY ENDOMETRIAL ABLATION N/A 08/11/2022    Procedure: ABLATION ENDOMETRIAL [place] Luelaine;  Surgeon: Rodríguez Franks DO;  [facility];  Service: Gynecology    NE REMOVAL INTRAUTERINE DEVICE IUD N/A 08/11/2022    Procedure: REMOVAL OF INTRAUTERINE DEVICE (IUD);   Surgeon: Rodríguez Franks DO;  [facility];  Service: Gynecology    TONSILLECTOMY      UPPER GASTROINTESTINAL ENDOSCOPY      US BREAST CYST ASPIRATION LEFT INITIAL Left 3/1/2023     Social History     Socioeconomic History    Marital status: /Civil Union     Spouse name: Not on file    Number of children: Not on file    Years of education: Not on file    Highest education level: Not on file   Occupational History    Occupation: Pharmacist    Tobacco Use    Smoking status: Never    Smokeless tobacco: Never   Vaping Use    Vaping Use: Never used   Substance and Sexual Activity    Alcohol use: Yes     Alcohol/week: 1 0 standard drink     Types: 1 Glasses of wine per week     Comment: occ    Drug use: Yes     Frequency: 7 0 times per week     Types: Marijuana     Comment: medical marijuana     Sexual activity: Yes     Partners: Male     Birth control/protection: I U D  Other Topics Concern    Not on file   Social History Narrative    Home environment domestic violence - denied         Alcohol: Negative - As per [employer]      Social Determinants of Health     Financial Resource Strain: Not on file   Food Insecurity: Not on file   Transportation Needs: Not on file   Physical Activity: Not on file   Stress: Not on file   Social Connections: Not on file   Intimate Partner Violence: Not on file   Housing Stability: Not on file     Family History   Problem Relation Age of Onset    Hyperlipidemia Mother     Heart disease Mother     Heart disease Father     Hyperlipidemia Father     No Known Problems Maternal Grandmother     No Known Problems Maternal Grandfather     No Known Problems Paternal Grandmother     No Known Problems Paternal Grandfather     No Known Problems Maternal Aunt     Colon cancer Maternal Uncle 29    No Known Problems Paternal Aunt     Breast cancer Cousin 50     Patient has no known allergies    Current Outpatient Medications   Medication Sig Dispense Refill    ALPRAZolam (XANAX) 0 25 mg tablet Take 1 tablet (0 25 mg total) by mouth daily at bedtime as needed for anxiety 30 tablet 0    ascorbic acid (VITAMIN C) 500 mg tablet Take 500 mg by mouth daily      BL EVENING PRIMROSE OIL PO Take by mouth      ibuprofen (MOTRIN) 200 mg tablet Take 3 tablets (600 mg total) by mouth every 6 (six) hours as needed for cramping  0    Multiple Vitamin (MULTI-VITAMIN DAILY) TABS Take by mouth daily after breakfast      pantoprazole (PROTONIX) 40 mg tablet TAKE 1 TABLET BY MOUTH EVERY DAY 90 tablet 4    phentermine 15 MG capsule Take 1 capsule (15 mg total) by mouth daily 30 capsule 0    Probiotic Product (PROBIOTIC-10) CAPS Take by mouth daily       sucralfate (CARAFATE) 1 g tablet Take 1 tablet (1 g total) by mouth 4 (four) times a day (Patient taking differently: Take 1 g by mouth 4 (four) times a day as needed) 120 tablet 1    vitamin E, tocopherol, 400 units capsule Take 400 Units by mouth daily       No current facility-administered medications for this visit  Blood pressure 110/72, pulse 78, height 6' (1 829 m), weight 79 1 kg (174 lb 6 4 oz), SpO2 100 %, not currently breastfeeding  PHYSICAL EXAM:      General Appearance:   Alert, cooperative, no distress, appears stated age    HEENT:   Normocephalic, atraumatic, anicteric      Neck:  Supple, symmetrical, trachea midline, no adenopathy;    thyroid: no enlargement/tenderness/nodules; no carotid  bruit or JVD    Lungs:   Clear to auscultation bilaterally; no rales, rhonchi or wheezing; respirations unlabored    Heart[de-identified]   S1 and S2 normal; regular rate and rhythm; no murmur, rub, or gallop     Abdomen:   Soft, non-tender, non-distended; normal bowel sounds; no masses, no organomegaly    Extremities: No edema, erythema, wounds, rashes   Rectal:   Deferred                      Lab Results   Component Value Date    WBC 8 4 06/07/2022    HGB 12 5 06/07/2022    HCT 37 7 06/07/2022    MCV 82 5 06/07/2022     (H) 06/07/2022     Lab Results   Component Value Date    CALCIUM 9 9 06/07/2022    K 4 8 06/07/2022    CO2 28 06/07/2022     06/07/2022    BUN 11 06/07/2022    CREATININE 0 71 06/07/2022     Lab Results   Component Value Date    ALT 15 06/07/2022    AST 19 06/07/2022    ALKPHOS 67 06/07/2022     No results found for: INR, PROTIME    US breast left limited (diagnostic)    Result Date: 3/1/2023  Impression: 10 mm minimally complicated cyst at the left breast 1 o'clock position at the area of pain/palpable concern  No suspicious finding or evidence of malignancy  Patient requests cyst aspiration due to discomfort at this area  ASSESSMENT/BI-RADS CATEGORY: Left: 2 - Benign Overall: 2 - Benign RECOMMENDATION:      - Routine screening mammogram at age 35 for the left breast  Workstation ID: [id number]     Mammo diagnostic left w 3d & cad    Result Date: 3/1/2023  Impression: 10 mm minimally complicated cyst at the left breast 1 o'clock position at the area of pain/palpable concern  No suspicious finding or evidence of malignancy  Patient requests cyst aspiration due to discomfort at this area  ASSESSMENT/BI-RADS CATEGORY: Left: 2 - Benign Overall: 2 - Benign RECOMMENDATION:      - Routine screening mammogram at age 35 for the left breast  Workstation ID: [id number]     US BREAST CYST ASPIRATION LEFT INITIAL    Result Date: 3/1/2023  Impression:  Technically successful left breast cyst aspiration  RECOMMENDATION:      - Routine screening mammogram at age 35 for the left breast  Workstation ID: [id number]       ASSESSMENT & PLAN:    Gastroesophageal reflux disease without esophagitis  Patient reports she has been on PPI therapy for about 10 years, had suboptimal symptomatic control with omeprazole as of late but this was changed to pantoprazole and she appears to be having good control of symptoms at this time  Recent EGD shows no evidence of hiatal hernia, Gonzalez's esophagus or EOE      -Advised patient about dietary and lifestyle modification strategies for the mitigation of GERD, patient says she will continue to work on these    -Continue pantoprazole 40 mg daily, advised patient on risks and benefits of long-term PPI therapy, would  that benefits outweigh risks for this particular patient, she says she has tried to taper PPI in the past without success    -May also continue to use Carafate on an as-needed basis, patient says she is only needing this a few times a month    -Will be due for colon cancer screening at age 38    -We will follow-up in 1 year, or sooner as needed

## 2023-05-02 NOTE — ASSESSMENT & PLAN NOTE
Patient reports she has been on PPI therapy for about 10 years, had suboptimal symptomatic control with omeprazole as of late but this was changed to pantoprazole and she appears to be having good control of symptoms at this time  Recent EGD shows no evidence of hiatal hernia, Gonzalez's esophagus or EOE      -Advised patient about dietary and lifestyle modification strategies for the mitigation of GERD, patient says she will continue to work on these    -Continue pantoprazole 40 mg daily, advised patient on risks and benefits of long-term PPI therapy, would  that benefits outweigh risks for this particular patient, she says she has tried to taper PPI in the past without success    -May also continue to use Carafate on an as-needed basis, patient says she is only needing this a few times a month    -Will be due for colon cancer screening at age 38    -We will follow-up in 1 year, or sooner as needed

## 2023-05-08 RX ORDER — PHENTERMINE HYDROCHLORIDE 15 MG/1
15 CAPSULE ORAL DAILY
Qty: 30 CAPSULE | Refills: 0 | Status: SHIPPED | OUTPATIENT
Start: 2023-05-08

## 2023-06-05 RX ORDER — PHENTERMINE HYDROCHLORIDE 15 MG/1
15 CAPSULE ORAL DAILY
Qty: 30 CAPSULE | Refills: 0 | Status: SHIPPED | OUTPATIENT
Start: 2023-06-05

## 2023-06-09 ENCOUNTER — ANNUAL EXAM (OUTPATIENT)
Dept: OBGYN CLINIC | Facility: CLINIC | Age: 38
End: 2023-06-09
Payer: COMMERCIAL

## 2023-06-09 VITALS
BODY MASS INDEX: 27.68 KG/M2 | SYSTOLIC BLOOD PRESSURE: 102 MMHG | DIASTOLIC BLOOD PRESSURE: 62 MMHG | WEIGHT: 172.2 LBS | HEIGHT: 66 IN

## 2023-06-09 DIAGNOSIS — Z01.419 PAP SMEAR, AS PART OF ROUTINE GYNECOLOGICAL EXAMINATION: ICD-10-CM

## 2023-06-09 DIAGNOSIS — Z01.411 ENCOUNTER FOR GYNECOLOGICAL EXAMINATION WITH ABNORMAL FINDING: Primary | ICD-10-CM

## 2023-06-09 PROCEDURE — G0476 HPV COMBO ASSAY CA SCREEN: HCPCS | Performed by: OBSTETRICS & GYNECOLOGY

## 2023-06-09 PROCEDURE — G0124 SCREEN C/V THIN LAYER BY MD: HCPCS | Performed by: PATHOLOGY

## 2023-06-09 PROCEDURE — G0145 SCR C/V CYTO,THINLAYER,RESCR: HCPCS | Performed by: PATHOLOGY

## 2023-06-09 PROCEDURE — S0612 ANNUAL GYNECOLOGICAL EXAMINA: HCPCS | Performed by: OBSTETRICS & GYNECOLOGY

## 2023-06-09 NOTE — PATIENT INSTRUCTIONS
Normal gynecological physical examination  Self-breast examination stressed  Pap smear completed in office today  Discussed regular exercise, healthy diet, importance of vitamin D and calcium supplements  Discussed importance of sun block use during periods of prolonged sun exposure  All patient's questions answered in office today  Patient will be seen in 1 year for routine gynecologic and medical examination  Patient will call office for any problems, concerns, or issues which may arise during the interim     Has had mammogram in the past and due for her next 1 at age 35

## 2023-06-09 NOTE — PROGRESS NOTES
Assessment/Plan:    No problem-specific Assessment & Plan notes found for this encounter  Diagnoses and all orders for this visit:    Encounter for gynecological examination with abnormal finding  -     Liquid-based pap, screening    Pap smear, as part of routine gynecological examination          Normal gynecological physical examination  Self-breast examination stressed  Discussed regular exercise, healthy diet, importance of vitamin D and calcium supplements  Discussed importance of sun block use during periods of prolonged sun exposure  Patient will be seen in 1 year for routine gynecologic and medical examination  Patient will call office for any problems, concerns, or issues which may arise during the interim  Subjective:          Brandon Crump is a 19-year-old female coming in for annual exam   She has no concerns and states that she feels well overall  She performs self breast exams at home and denies noticing any masses, discoloration, nipple changes  She has had breast cysts in the past and had a mammogram completed last August which showed they were all benign  She has been using primrose oil which has helped  On exam today no masses were noted, but on the left breast at the 1-3 o'clock area there was some tenderness which she states was the location of the cyst in the past   She does not need another mammogram until age 35  She states that she got an ablation done last August and has not had a period since  She denies any intermittent bleeding, unusual vaginal discharge, bladder pressure, abdominal pain, flank pain, urinary symptoms, chest pain, and shortness of breath  She is currently sexually active with only 1 partner, her , and is not currently interested in STI testing  Pap smear completed in office today  We will see her back in 1 year for yearly appointment        Patient ID: [id number] Walsh is a 44 y o  female who presents today for her annual gynecologic and medical "examination    Menstrual status: Ablation completed in August, has not had a period since  Vasomotor symptoms: None    Patient reports normal appetite    Patient reports normal bowel and bladder habits    Patient denies any significant pelvic or abdominal pain    Patient denies any headaches, chest pain, shortness of breath fever shakes or chills    Patient denies any COVID 19 symptoms including cough or loss of taste or smell    COVID vaccine status: Fully vaccinated    Medical problems: None    Colonoscopy status: Not applicable at this time    Mammogram status: Mammogram completed in August for breast cysts  She does not need other mammogram until age 35  The following portions of the patient's history were reviewed and updated as appropriate: allergies, current medications, past family history, past medical history, past social history, past surgical history and problem list     Review of Systems   Constitutional: Negative  Negative for appetite change, diaphoresis, fatigue, fever and unexpected weight change  HENT: Negative  Eyes: Negative  Respiratory: Negative  Cardiovascular: Negative  Gastrointestinal: Negative  Negative for abdominal pain, blood in stool, constipation, diarrhea, nausea and vomiting  Endocrine: Negative  Negative for cold intolerance and heat intolerance  Genitourinary: Negative  Negative for dysuria, frequency, hematuria, urgency, vaginal bleeding, vaginal discharge and vaginal pain  Musculoskeletal: Negative  Skin: Negative  Allergic/Immunologic: Negative  Neurological: Negative  Hematological: Negative  Negative for adenopathy  Psychiatric/Behavioral: Negative  Objective:      /62   Ht 5' 6\" (1 676 m)   Wt 78 1 kg (172 lb 3 2 oz)   BMI 27 79 kg/m²          Physical Exam  Constitutional:       General: She is not in acute distress  Appearance: Normal appearance  She is well-developed  She is not diaphoretic     HENT:      " Head: Normocephalic and atraumatic  Eyes:      Pupils: Pupils are equal, round, and reactive to light  Cardiovascular:      Rate and Rhythm: Normal rate and regular rhythm  Heart sounds: Normal heart sounds  No murmur heard  No friction rub  No gallop  Pulmonary:      Effort: Pulmonary effort is normal       Breath sounds: Normal breath sounds  Chest:   Breasts:     Breasts are symmetrical       Right: No inverted nipple, mass, nipple discharge, skin change or tenderness  Left: No inverted nipple, mass, nipple discharge, skin change or tenderness  Abdominal:      General: Bowel sounds are normal       Palpations: Abdomen is soft  Genitourinary:     General: Normal vulva  Exam position: Supine  Labia:         Right: No rash or lesion  Left: No rash or lesion  Vagina: Normal  No vaginal discharge, erythema, tenderness or bleeding  Cervix: No discharge or friability  Uterus: Not enlarged and not tender  Adnexa:         Right: No mass, tenderness or fullness  Left: No mass, tenderness or fullness  Musculoskeletal:         General: Normal range of motion  Cervical back: Normal range of motion and neck supple  Lymphadenopathy:      Cervical: No cervical adenopathy  Upper Body:      Right upper body: No supraclavicular adenopathy  Left upper body: No supraclavicular adenopathy  Skin:     General: Skin is warm and dry  Findings: No rash  Neurological:      General: No focal deficit present  Mental Status: She is alert and oriented to person, place, and time  Psychiatric:         Mood and Affect: Mood normal          Speech: Speech normal          Behavior: Behavior normal          Thought Content:  Thought content normal          Judgment: Judgment normal

## 2023-06-15 LAB
LAB AP GYN PRIMARY INTERPRETATION: NORMAL
Lab: NORMAL
PATH INTERP SPEC-IMP: NORMAL

## 2023-06-22 LAB
ALBUMIN SERPL-MCNC: 4.2 G/DL (ref 3.6–5.1)
ALBUMIN/GLOB SERPL: 1.5 (CALC) (ref 1–2.5)
ALP SERPL-CCNC: 64 U/L (ref 31–125)
ALT SERPL-CCNC: 11 U/L (ref 6–29)
AST SERPL-CCNC: 11 U/L (ref 10–30)
BASOPHILS # BLD AUTO: 77 CELLS/UL (ref 0–200)
BASOPHILS NFR BLD AUTO: 0.8 %
BILIRUB SERPL-MCNC: 0.3 MG/DL (ref 0.2–1.2)
BUN SERPL-MCNC: 15 MG/DL (ref 7–25)
BUN/CREAT SERPL: NORMAL (CALC) (ref 6–22)
CALCIUM SERPL-MCNC: 10.1 MG/DL (ref 8.6–10.2)
CHLORIDE SERPL-SCNC: 101 MMOL/L (ref 98–110)
CHOLEST SERPL-MCNC: 213 MG/DL
CHOLEST/HDLC SERPL: 3.9 (CALC)
CO2 SERPL-SCNC: 29 MMOL/L (ref 20–32)
CREAT SERPL-MCNC: 0.78 MG/DL (ref 0.5–0.97)
EOSINOPHIL # BLD AUTO: 307 CELLS/UL (ref 15–500)
EOSINOPHIL NFR BLD AUTO: 3.2 %
ERYTHROCYTE [DISTWIDTH] IN BLOOD BY AUTOMATED COUNT: 12.5 % (ref 11–15)
GFR/BSA.PRED SERPLBLD CYS-BASED-ARV: 100 ML/MIN/1.73M2
GLOBULIN SER CALC-MCNC: 2.8 G/DL (CALC) (ref 1.9–3.7)
GLUCOSE SERPL-MCNC: 90 MG/DL (ref 65–99)
HBA1C MFR BLD: 5.2 % OF TOTAL HGB
HCT VFR BLD AUTO: 41.1 % (ref 35–45)
HDLC SERPL-MCNC: 55 MG/DL
HGB BLD-MCNC: 13.2 G/DL (ref 11.7–15.5)
LDLC SERPL CALC-MCNC: 136 MG/DL (CALC)
LYMPHOCYTES # BLD AUTO: 2890 CELLS/UL (ref 850–3900)
LYMPHOCYTES NFR BLD AUTO: 30.1 %
MCH RBC QN AUTO: 27 PG (ref 27–33)
MCHC RBC AUTO-ENTMCNC: 32.1 G/DL (ref 32–36)
MCV RBC AUTO: 84.2 FL (ref 80–100)
MONOCYTES # BLD AUTO: 826 CELLS/UL (ref 200–950)
MONOCYTES NFR BLD AUTO: 8.6 %
NEUTROPHILS # BLD AUTO: 5501 CELLS/UL (ref 1500–7800)
NEUTROPHILS NFR BLD AUTO: 57.3 %
NONHDLC SERPL-MCNC: 158 MG/DL (CALC)
PLATELET # BLD AUTO: 432 THOUSAND/UL (ref 140–400)
PMV BLD REES-ECKER: 9.9 FL (ref 7.5–12.5)
POTASSIUM SERPL-SCNC: 5 MMOL/L (ref 3.5–5.3)
PROT SERPL-MCNC: 7 G/DL (ref 6.1–8.1)
RBC # BLD AUTO: 4.88 MILLION/UL (ref 3.8–5.1)
SODIUM SERPL-SCNC: 138 MMOL/L (ref 135–146)
TRIGL SERPL-MCNC: 116 MG/DL
TSH SERPL-ACNC: 0.61 MIU/L
WBC # BLD AUTO: 9.6 THOUSAND/UL (ref 3.8–10.8)

## 2023-07-07 ENCOUNTER — OFFICE VISIT (OUTPATIENT)
Dept: INTERNAL MEDICINE CLINIC | Facility: CLINIC | Age: 38
End: 2023-07-07
Payer: COMMERCIAL

## 2023-07-07 VITALS
HEIGHT: 66 IN | TEMPERATURE: 98.9 F | WEIGHT: 175.6 LBS | BODY MASS INDEX: 28.22 KG/M2 | OXYGEN SATURATION: 99 % | HEART RATE: 74 BPM | SYSTOLIC BLOOD PRESSURE: 116 MMHG | DIASTOLIC BLOOD PRESSURE: 72 MMHG

## 2023-07-07 DIAGNOSIS — M54.50 LOW BACK PAIN AT MULTIPLE SITES: ICD-10-CM

## 2023-07-07 DIAGNOSIS — E78.2 MIXED HYPERLIPIDEMIA: ICD-10-CM

## 2023-07-07 DIAGNOSIS — F41.9 ANXIETY: Primary | ICD-10-CM

## 2023-07-07 DIAGNOSIS — F41.9 ANXIETY: ICD-10-CM

## 2023-07-07 DIAGNOSIS — F34.1 DYSTHYMIC DISORDER: ICD-10-CM

## 2023-07-07 DIAGNOSIS — R73.01 IMPAIRED FASTING BLOOD SUGAR: ICD-10-CM

## 2023-07-07 DIAGNOSIS — K21.9 GASTROESOPHAGEAL REFLUX DISEASE WITHOUT ESOPHAGITIS: ICD-10-CM

## 2023-07-07 PROCEDURE — 99214 OFFICE O/P EST MOD 30 MIN: CPT | Performed by: INTERNAL MEDICINE

## 2023-07-07 RX ORDER — ALPRAZOLAM 0.5 MG/1
0.5 TABLET ORAL
COMMUNITY
End: 2023-07-07 | Stop reason: SDUPTHER

## 2023-07-07 RX ORDER — PHENTERMINE HYDROCHLORIDE 15 MG/1
15 CAPSULE ORAL DAILY
Qty: 30 CAPSULE | Refills: 0 | Status: SHIPPED | OUTPATIENT
Start: 2023-07-07

## 2023-07-07 RX ORDER — PROMETHAZINE HYDROCHLORIDE 25 MG/1
25 TABLET ORAL EVERY 6 HOURS PRN
Qty: 30 TABLET | Refills: 0 | Status: SHIPPED | OUTPATIENT
Start: 2023-07-07

## 2023-07-07 RX ORDER — CYCLOBENZAPRINE HCL 10 MG
10 TABLET ORAL 3 TIMES DAILY PRN
Qty: 30 TABLET | Refills: 0 | Status: SHIPPED | OUTPATIENT
Start: 2023-07-07

## 2023-07-07 RX ORDER — ALPRAZOLAM 0.5 MG/1
0.5 TABLET ORAL
Qty: 30 TABLET | Refills: 0 | Status: SHIPPED | OUTPATIENT
Start: 2023-07-07

## 2023-07-07 NOTE — PROGRESS NOTES
Assessment/Plan:             1. Anxiety  -     ALPRAZolam (XANAX) 0.5 mg tablet; Take 1 tablet (0.5 mg total) by mouth daily at bedtime as needed for anxiety    2. Mixed hyperlipidemia    3. Dysthymic disorder    4. Gastroesophageal reflux disease without esophagitis  -     promethazine (PHENERGAN) 25 mg tablet; Take 1 tablet (25 mg total) by mouth every 6 (six) hours as needed for nausea or vomiting    5. Impaired fasting blood sugar    6. Body mass index 28.0-28.9, adult  -     phentermine 15 MG capsule; Take 1 capsule (15 mg total) by mouth daily    7. Low back pain at multiple sites  -     cyclobenzaprine (FLEXERIL) 10 mg tablet; Take 1 tablet (10 mg total) by mouth 3 (three) times a day as needed for muscle spasms           Subjective:      Patient ID: Opal Sierra is a 44 y.o. female. Follow-up on multiple medical problems to ensure they are stable on current medication, needs her medications      The following portions of the patient's history were reviewed and updated as appropriate: She  has a past medical history of Abnormal Pap smear of cervix, Allergic, Allergic rhinitis, Anxiety, Anxiety and depression, Chronic post-traumatic stress disorder (PTSD), GERD (gastroesophageal reflux disease), HPV (human papilloma virus) infection, colposcopy with cervical biopsy, Hyperlipidemia, and Migraine.   She   Patient Active Problem List    Diagnosis Date Noted   • Low back pain at multiple sites 07/07/2023   • Impaired fasting blood sugar 03/15/2023   • Medical marijuana use 02/27/2023   • Breast pain 02/02/2023   • Dysthymic disorder 11/09/2022   • COVID-19 virus detected 08/18/2022   • Menorrhagia with regular cycle 08/11/2022   • Body mass index 28.0-28.9, adult 02/22/2021   • Gastroesophageal reflux disease without esophagitis    • Mixed hyperlipidemia    • Annual physical exam 06/29/2020   • S/P dilation and curettage 01/06/2020   • Endometrial polyp 11/20/2019   • Abnormal uterine bleeding 11/20/2019 • Anxiety 04/05/2019   • Premenstrual syndrome 03/24/2017     She  has a past surgical history that includes Colposcopy; Contraceptive capsule removal; pr hysteroscopy bx endometrium&/polypc w/wo d&c (N/A, 01/06/2020); Tonsillectomy; pr hysteroscopy bx endometrium&/polypc w/wo d&c (N/A, 08/11/2022); pr hysteroscopy endometrial ablation (N/A, 08/11/2022); pr removal intrauterine device iud (N/A, 08/11/2022); Upper gastrointestinal endoscopy; and US breast cyst aspiration left initial (Left, 3/1/2023). Her family history includes Alcohol abuse in her father; Arthritis in her maternal grandmother and paternal grandmother; Breast cancer in her cousin; Colon cancer in her maternal uncle; Dementia in her paternal grandmother; Depression in her father; Diabetes in her maternal grandfather, maternal grandmother, and paternal grandfather; Glaucoma in her paternal grandmother; Hearing loss in her paternal grandfather; Heart disease in her father, maternal grandmother, mother, and paternal grandmother; Hyperlipidemia in her father and mother; Hypertension in her maternal grandfather, maternal grandmother, paternal grandfather, and paternal grandmother; Mental illness in her maternal grandfather; No Known Problems in her maternal aunt and paternal aunt. She  reports that she has never smoked. She has never used smokeless tobacco. She reports current alcohol use of about 6.0 standard drinks of alcohol per week. She reports current drug use. Frequency: 7.00 times per week. Drug: Marijuana.   Current Outpatient Medications   Medication Sig Dispense Refill   • ALPRAZolam (XANAX) 0.5 mg tablet Take 1 tablet (0.5 mg total) by mouth daily at bedtime as needed for anxiety 30 tablet 0   • ascorbic acid (VITAMIN C) 500 mg tablet Take 500 mg by mouth daily     • BL EVENING PRIMROSE OIL PO Take by mouth     • cyclobenzaprine (FLEXERIL) 10 mg tablet Take 1 tablet (10 mg total) by mouth 3 (three) times a day as needed for muscle spasms 30 tablet 0   • ibuprofen (MOTRIN) 200 mg tablet Take 3 tablets (600 mg total) by mouth every 6 (six) hours as needed for cramping  0   • Multiple Vitamin (MULTI-VITAMIN DAILY) TABS Take by mouth daily after breakfast     • pantoprazole (PROTONIX) 40 mg tablet TAKE 1 TABLET BY MOUTH EVERY DAY 90 tablet 4   • phentermine 15 MG capsule Take 1 capsule (15 mg total) by mouth daily 30 capsule 0   • Probiotic Product (PROBIOTIC-10) CAPS Take by mouth daily      • promethazine (PHENERGAN) 25 mg tablet Take 1 tablet (25 mg total) by mouth every 6 (six) hours as needed for nausea or vomiting 30 tablet 0   • sucralfate (CARAFATE) 1 g tablet Take 1 tablet (1 g total) by mouth 4 (four) times a day (Patient taking differently: Take 1 g by mouth 4 (four) times a day as needed) 120 tablet 1   • vitamin E, tocopherol, 400 units capsule Take 400 Units by mouth daily       No current facility-administered medications for this visit.      Current Outpatient Medications on File Prior to Visit   Medication Sig   • ascorbic acid (VITAMIN C) 500 mg tablet Take 500 mg by mouth daily   • BL EVENING PRIMROSE OIL PO Take by mouth   • ibuprofen (MOTRIN) 200 mg tablet Take 3 tablets (600 mg total) by mouth every 6 (six) hours as needed for cramping   • Multiple Vitamin (MULTI-VITAMIN DAILY) TABS Take by mouth daily after breakfast   • pantoprazole (PROTONIX) 40 mg tablet TAKE 1 TABLET BY MOUTH EVERY DAY   • Probiotic Product (PROBIOTIC-10) CAPS Take by mouth daily    • sucralfate (CARAFATE) 1 g tablet Take 1 tablet (1 g total) by mouth 4 (four) times a day (Patient taking differently: Take 1 g by mouth 4 (four) times a day as needed)   • vitamin E, tocopherol, 400 units capsule Take 400 Units by mouth daily   • [DISCONTINUED] ALPRAZolam (XANAX) 0.5 mg tablet Take 0.5 mg by mouth daily at bedtime as needed for anxiety   • [DISCONTINUED] phentermine 15 MG capsule Take 1 capsule (15 mg total) by mouth daily   • [DISCONTINUED] ALPRAZolam (XANAX) 0.25 mg tablet Take 1 tablet (0.25 mg total) by mouth daily at bedtime as needed for anxiety (Patient not taking: Reported on 7/7/2023)     No current facility-administered medications on file prior to visit. She has No Known Allergies. .    Review of Systems   Constitutional: Negative for chills and fever. HENT: Negative for congestion, ear pain and sore throat. Eyes: Negative for pain. Respiratory: Negative for cough and shortness of breath. Cardiovascular: Negative for chest pain and leg swelling. Gastrointestinal: Negative for abdominal pain, nausea and vomiting. Endocrine: Negative for polyuria. Genitourinary: Negative for difficulty urinating, frequency and urgency. Musculoskeletal: Negative for arthralgias and back pain. Skin: Negative for rash. Neurological: Negative for weakness and headaches. Psychiatric/Behavioral: Negative for sleep disturbance. The patient is not nervous/anxious.           Objective:      /72 (BP Location: Left arm, Patient Position: Sitting, Cuff Size: Standard)   Pulse 74   Temp 98.9 °F (37.2 °C) (Temporal)   Ht 5' 6" (1.676 m)   Wt 79.7 kg (175 lb 9.6 oz)   SpO2 99%   BMI 28.34 kg/m²     Recent Results (from the past 1344 hour(s))   Liquid-based pap, screening    Collection Time: 06/09/23  8:46 AM   Result Value Ref Range    Case Report       Gynecologic Cytology Report                       Case: [id number]                                  Authorizing Provider:  Caleb Betancourt MD   Collected:           06/09/2023 0846              Ordering Location:     [facility] Received:            06/09/2023 0846                                     GYN                                                                          First Screen:          Marino Selby                                                               Pathologist:           Rui Cortez MD                                                             Specimen: LIQUID-BASED PAP, SCREENING, Cervix                                                        Primary Interpretation Negative for intraepithelial lesion or malignancy     Interpretation Reactive cellular changes associated with repair     Specimen Adequacy       Satisfactory for evaluation. Absence of endocervical/transformation zone component. Note       Intradepartmental consultation was obtained. Additional Information       [employer]'s FDA approved ,  and ThinPrep Imaging Duo System are utilized with strict adherence to the 's instruction manual to prepare gynecologic and non-gynecologic cytology specimens for the production of ThinPrep slides as well as for gynecologic ThinPrep imaging. These processes have been validated by our laboratory and/or by the . The Pap test is not a diagnostic procedure and should not be used as the sole means to detect cervical cancer. It is only a screening procedure to aid in the detection of cervical cancer and its precursors. Both false-negative and false-positive results have been experienced. Your patient's test result should be interpreted in this context together with the history and clinical findings. HPV High Risk    Collection Time: 06/09/23  8:46 AM    Specimen: Cervix;  Thin-Prep Vial   Result Value Ref Range    HPV Other HR Negative Negative    HPV16 Negative Negative    HPV18 Negative Negative   Lipid Panel with Direct LDL reflex    Collection Time: 06/21/23  8:38 AM   Result Value Ref Range    Total Cholesterol 213 (H) <200 mg/dL    HDL 55 > OR = 50 mg/dL    Triglycerides 116 <150 mg/dL    LDL Calculated 136 (H) mg/dL (calc)    Chol HDLC Ratio 3.9 <5.0 (calc)    Non-HDL Cholesterol 158 (H) <130 mg/dL (calc)   Comprehensive metabolic panel    Collection Time: 06/21/23  8:38 AM   Result Value Ref Range    Glucose, Random 90 65 - 99 mg/dL    BUN 15 7 - 25 mg/dL    Creatinine 0.78 0.50 - 0.97 mg/dL    eGFR 100 > OR = 60 mL/min/1.73m2    SL AMB BUN/CREATININE RATIO NOT APPLICABLE 6 - 22 (calc)    Sodium 138 135 - 146 mmol/L    Potassium 5.0 3.5 - 5.3 mmol/L    Chloride 101 98 - 110 mmol/L    CO2 29 20 - 32 mmol/L    Calcium 10.1 8.6 - 10.2 mg/dL    Protein, Total 7.0 6.1 - 8.1 g/dL    Albumin 4.2 3.6 - 5.1 g/dL    Globulin 2.8 1.9 - 3.7 g/dL (calc)    Albumin/Globulin Ratio 1.5 1.0 - 2.5 (calc)    TOTAL BILIRUBIN 0.3 0.2 - 1.2 mg/dL    Alkaline Phosphatase 64 31 - 125 U/L    AST 11 10 - 30 U/L    ALT 11 6 - 29 U/L   CBC and differential    Collection Time: 06/21/23  8:38 AM   Result Value Ref Range    White Blood Cell Count 9.6 3.8 - 10.8 Thousand/uL    Red Blood Cell Count 4.88 3.80 - 5.10 Million/uL    Hemoglobin 13.2 11.7 - 15.5 g/dL    HCT 41.1 35.0 - 45.0 %    MCV 84.2 80.0 - 100.0 fL    MCH 27.0 27.0 - 33.0 pg    MCHC 32.1 32.0 - 36.0 g/dL    RDW 12.5 11.0 - 15.0 %    Platelet Count 457 (H) 140 - 400 Thousand/uL    SL AMB MPV 9.9 7.5 - 12.5 fL    Neutrophils (Absolute) 5,501 1,500 - 7,800 cells/uL    Lymphocytes (Absolute) 2,890 850 - 3,900 cells/uL    Monocytes (Absolute) 826 200 - 950 cells/uL    Eosinophils (Absolute) 307 15 - 500 cells/uL    Basophils ABS 77 0 - 200 cells/uL    Neutrophils 57.3 %    Lymphocytes 30.1 %    Monocytes 8.6 %    Eosinophils 3.2 %    Basophils PCT 0.8 %   TSH, 3rd generation    Collection Time: 06/21/23  8:38 AM   Result Value Ref Range    TSH 0.61 mIU/L   Hemoglobin A1c (w/out EAG)    Collection Time: 06/21/23  8:38 AM   Result Value Ref Range    Hemoglobin A1C 5.2 <5.7 % of total Hgb        Physical Exam  Constitutional:       Appearance: Normal appearance. HENT:      Head: Normocephalic. Right Ear: External ear normal.      Left Ear: External ear normal.      Nose: Nose normal. No congestion. Mouth/Throat:      Mouth: Mucous membranes are moist.      Pharynx: Oropharynx is clear. No oropharyngeal exudate or posterior oropharyngeal erythema.    Eyes:      Extraocular Movements: Extraocular movements intact. Conjunctiva/sclera: Conjunctivae normal.   Cardiovascular:      Rate and Rhythm: Normal rate and regular rhythm. Heart sounds: Normal heart sounds. No murmur heard. Pulmonary:      Effort: Pulmonary effort is normal.      Breath sounds: Normal breath sounds. No wheezing or rales. Abdominal:      General: Bowel sounds are normal. There is no distension. Palpations: Abdomen is soft. Tenderness: There is no abdominal tenderness. Musculoskeletal:         General: Normal range of motion. Cervical back: Normal range of motion and neck supple. Right lower leg: No edema. Left lower leg: No edema. Lymphadenopathy:      Cervical: No cervical adenopathy. Skin:     General: Skin is warm. Neurological:      General: No focal deficit present. Mental Status: She is alert and oriented to person, place, and time.

## 2023-08-21 RX ORDER — PHENTERMINE HYDROCHLORIDE 15 MG/1
15 CAPSULE ORAL DAILY
Qty: 30 CAPSULE | Refills: 0 | Status: SHIPPED | OUTPATIENT
Start: 2023-08-21

## 2023-09-12 ENCOUNTER — OFFICE VISIT (OUTPATIENT)
Dept: INTERNAL MEDICINE CLINIC | Facility: CLINIC | Age: 38
End: 2023-09-12
Payer: COMMERCIAL

## 2023-09-12 VITALS
BODY MASS INDEX: 27.74 KG/M2 | HEIGHT: 66 IN | HEART RATE: 76 BPM | WEIGHT: 172.6 LBS | SYSTOLIC BLOOD PRESSURE: 114 MMHG | TEMPERATURE: 98.2 F | DIASTOLIC BLOOD PRESSURE: 68 MMHG | OXYGEN SATURATION: 98 %

## 2023-09-12 DIAGNOSIS — E78.2 MIXED HYPERLIPIDEMIA: Primary | ICD-10-CM

## 2023-09-12 DIAGNOSIS — F34.1 DYSTHYMIC DISORDER: ICD-10-CM

## 2023-09-12 DIAGNOSIS — K21.9 GASTROESOPHAGEAL REFLUX DISEASE WITHOUT ESOPHAGITIS: ICD-10-CM

## 2023-09-12 PROCEDURE — 99214 OFFICE O/P EST MOD 30 MIN: CPT | Performed by: INTERNAL MEDICINE

## 2023-09-12 RX ORDER — PHENTERMINE HYDROCHLORIDE 15 MG/1
15 CAPSULE ORAL DAILY
Qty: 90 CAPSULE | Refills: 0 | Status: SHIPPED | OUTPATIENT
Start: 2023-09-12

## 2023-09-12 NOTE — PROGRESS NOTES
Assessment/Plan:             1. Mixed hyperlipidemia    2. Dysthymic disorder    3. Gastroesophageal reflux disease without esophagitis    4. Body mass index 28.0-28.9, adult  -     phentermine 15 MG capsule; Take 1 capsule (15 mg total) by mouth daily           Subjective:      Patient ID: Amador Nowak is a 43 y.o. female. Follow-up on multiple medical problems to ensure they are stable on current medications      The following portions of the patient's history were reviewed and updated as appropriate: She  has a past medical history of Abnormal Pap smear of cervix, Allergic, Allergic rhinitis, Anxiety, Anxiety and depression, Chronic post-traumatic stress disorder (PTSD), GERD (gastroesophageal reflux disease), HPV (human papilloma virus) infection, colposcopy with cervical biopsy, Hyperlipidemia, and Migraine. She   Patient Active Problem List    Diagnosis Date Noted   • Low back pain at multiple sites 07/07/2023   • Impaired fasting blood sugar 03/15/2023   • Medical marijuana use 02/27/2023   • Breast pain 02/02/2023   • Dysthymic disorder 11/09/2022   • COVID-19 virus detected 08/18/2022   • Menorrhagia with regular cycle 08/11/2022   • Body mass index 28.0-28.9, adult 02/22/2021   • Gastroesophageal reflux disease without esophagitis    • Mixed hyperlipidemia    • Annual physical exam 06/29/2020   • S/P dilation and curettage 01/06/2020   • Endometrial polyp 11/20/2019   • Abnormal uterine bleeding 11/20/2019   • Anxiety 04/05/2019   • Premenstrual syndrome 03/24/2017     She  has a past surgical history that includes Colposcopy; Contraceptive capsule removal; pr hysteroscopy bx endometrium&/polypc w/wo d&c (N/A, 01/06/2020); Tonsillectomy; pr hysteroscopy bx endometrium&/polypc w/wo d&c (N/A, 08/11/2022); pr hysteroscopy endometrial ablation (N/A, 08/11/2022); pr removal intrauterine device iud (N/A, 08/11/2022);  Upper gastrointestinal endoscopy; and US breast cyst aspiration left initial (Left, 3/1/2023). Her family history includes Alcohol abuse in her father; Arthritis in her maternal grandmother and paternal grandmother; Breast cancer in her cousin; Colon cancer in her maternal uncle; Dementia in her paternal grandmother; Depression in her father; Diabetes in her maternal grandfather, maternal grandmother, and paternal grandfather; Glaucoma in her paternal grandmother; Hearing loss in her paternal grandfather; Heart disease in her father, maternal grandmother, mother, and paternal grandmother; Hyperlipidemia in her father and mother; Hypertension in her maternal grandfather, maternal grandmother, paternal grandfather, and paternal grandmother; Mental illness in her maternal grandfather; No Known Problems in her maternal aunt and paternal aunt. She  reports that she has never smoked. She has never used smokeless tobacco. She reports current alcohol use of about 6.0 standard drinks of alcohol per week. She reports current drug use. Frequency: 7.00 times per week. Drug: Marijuana.   Current Outpatient Medications   Medication Sig Dispense Refill   • ALPRAZolam (XANAX) 0.5 mg tablet Take 1 tablet (0.5 mg total) by mouth daily at bedtime as needed for anxiety 30 tablet 0   • ascorbic acid (VITAMIN C) 500 mg tablet Take 500 mg by mouth daily     • BL EVENING PRIMROSE OIL PO Take by mouth     • cyclobenzaprine (FLEXERIL) 10 mg tablet Take 1 tablet (10 mg total) by mouth 3 (three) times a day as needed for muscle spasms 30 tablet 0   • ibuprofen (MOTRIN) 200 mg tablet Take 3 tablets (600 mg total) by mouth every 6 (six) hours as needed for cramping  0   • Multiple Vitamin (MULTI-VITAMIN DAILY) TABS Take by mouth daily after breakfast     • pantoprazole (PROTONIX) 40 mg tablet TAKE 1 TABLET BY MOUTH EVERY DAY 90 tablet 4   • phentermine 15 MG capsule Take 1 capsule (15 mg total) by mouth daily 90 capsule 0   • Probiotic Product (PROBIOTIC-10) CAPS Take by mouth daily      • promethazine (PHENERGAN) 25 mg tablet Take 1 tablet (25 mg total) by mouth every 6 (six) hours as needed for nausea or vomiting 30 tablet 0   • sucralfate (CARAFATE) 1 g tablet Take 1 tablet (1 g total) by mouth 4 (four) times a day (Patient taking differently: Take 1 g by mouth 4 (four) times a day as needed) 120 tablet 1     No current facility-administered medications for this visit. Current Outpatient Medications on File Prior to Visit   Medication Sig   • ALPRAZolam (XANAX) 0.5 mg tablet Take 1 tablet (0.5 mg total) by mouth daily at bedtime as needed for anxiety   • ascorbic acid (VITAMIN C) 500 mg tablet Take 500 mg by mouth daily   • BL EVENING PRIMROSE OIL PO Take by mouth   • cyclobenzaprine (FLEXERIL) 10 mg tablet Take 1 tablet (10 mg total) by mouth 3 (three) times a day as needed for muscle spasms   • ibuprofen (MOTRIN) 200 mg tablet Take 3 tablets (600 mg total) by mouth every 6 (six) hours as needed for cramping   • Multiple Vitamin (MULTI-VITAMIN DAILY) TABS Take by mouth daily after breakfast   • pantoprazole (PROTONIX) 40 mg tablet TAKE 1 TABLET BY MOUTH EVERY DAY   • Probiotic Product (PROBIOTIC-10) CAPS Take by mouth daily    • promethazine (PHENERGAN) 25 mg tablet Take 1 tablet (25 mg total) by mouth every 6 (six) hours as needed for nausea or vomiting   • sucralfate (CARAFATE) 1 g tablet Take 1 tablet (1 g total) by mouth 4 (four) times a day (Patient taking differently: Take 1 g by mouth 4 (four) times a day as needed)   • [DISCONTINUED] phentermine 15 MG capsule Take 1 capsule (15 mg total) by mouth daily   • [DISCONTINUED] vitamin E, tocopherol, 400 units capsule Take 400 Units by mouth daily (Patient not taking: Reported on 9/12/2023)     No current facility-administered medications on file prior to visit. She has No Known Allergies. .    Review of Systems   Constitutional: Negative for chills and fever. HENT: Negative for congestion, ear pain and sore throat. Eyes: Negative for pain.    Respiratory: Negative for cough and shortness of breath. Cardiovascular: Negative for chest pain and leg swelling. Gastrointestinal: Negative for abdominal pain, nausea and vomiting. Endocrine: Negative for polyuria. Genitourinary: Negative for difficulty urinating, frequency and urgency. Musculoskeletal: Negative for arthralgias and back pain. Skin: Negative for rash. Neurological: Negative for weakness and headaches. Psychiatric/Behavioral: Negative for sleep disturbance. The patient is not nervous/anxious. Objective:      /68 (BP Location: Right arm, Patient Position: Sitting, Cuff Size: Standard)   Pulse 76   Temp 98.2 °F (36.8 °C)   Ht 5' 6" (1.676 m)   Wt 78.3 kg (172 lb 9.6 oz)   SpO2 98%   BMI 27.86 kg/m²     No results found for this or any previous visit (from the past 1344 hour(s)). Physical Exam  Constitutional:       Appearance: Normal appearance. HENT:      Head: Normocephalic. Right Ear: External ear normal.      Left Ear: External ear normal.      Nose: Nose normal. No congestion. Mouth/Throat:      Mouth: Mucous membranes are moist.      Pharynx: Oropharynx is clear. No oropharyngeal exudate or posterior oropharyngeal erythema. Eyes:      Extraocular Movements: Extraocular movements intact. Conjunctiva/sclera: Conjunctivae normal.   Cardiovascular:      Rate and Rhythm: Normal rate and regular rhythm. Heart sounds: Normal heart sounds. No murmur heard. Pulmonary:      Effort: Pulmonary effort is normal.      Breath sounds: Normal breath sounds. No wheezing or rales. Abdominal:      General: Bowel sounds are normal. There is no distension. Palpations: Abdomen is soft. Tenderness: There is no abdominal tenderness. Musculoskeletal:         General: Normal range of motion. Cervical back: Normal range of motion and neck supple. Right lower leg: No edema. Left lower leg: No edema.    Lymphadenopathy:      Cervical: No cervical adenopathy. Skin:     General: Skin is warm. Neurological:      General: No focal deficit present. Mental Status: She is alert and oriented to person, place, and time.

## 2023-12-11 RX ORDER — PHENTERMINE HYDROCHLORIDE 15 MG/1
15 CAPSULE ORAL DAILY
Qty: 90 CAPSULE | Refills: 0 | Status: SHIPPED | OUTPATIENT
Start: 2023-12-11

## 2024-01-16 ENCOUNTER — OFFICE VISIT (OUTPATIENT)
Dept: INTERNAL MEDICINE CLINIC | Facility: CLINIC | Age: 39
End: 2024-01-16
Payer: COMMERCIAL

## 2024-01-16 VITALS
BODY MASS INDEX: 27.64 KG/M2 | HEIGHT: 66 IN | HEART RATE: 72 BPM | TEMPERATURE: 97.8 F | OXYGEN SATURATION: 97 % | SYSTOLIC BLOOD PRESSURE: 112 MMHG | DIASTOLIC BLOOD PRESSURE: 70 MMHG | WEIGHT: 172 LBS

## 2024-01-16 DIAGNOSIS — R73.01 IMPAIRED FASTING BLOOD SUGAR: ICD-10-CM

## 2024-01-16 DIAGNOSIS — E78.2 MIXED HYPERLIPIDEMIA: Primary | ICD-10-CM

## 2024-01-16 DIAGNOSIS — K21.9 GASTROESOPHAGEAL REFLUX DISEASE WITHOUT ESOPHAGITIS: ICD-10-CM

## 2024-01-16 PROCEDURE — 99214 OFFICE O/P EST MOD 30 MIN: CPT | Performed by: INTERNAL MEDICINE

## 2024-01-16 RX ORDER — PANTOPRAZOLE SODIUM 40 MG/1
40 TABLET, DELAYED RELEASE ORAL DAILY
Qty: 90 TABLET | Refills: 3 | Status: SHIPPED | OUTPATIENT
Start: 2024-01-16

## 2024-01-16 RX ORDER — PHENTERMINE HYDROCHLORIDE 15 MG/1
15 CAPSULE ORAL DAILY
Qty: 90 CAPSULE | Refills: 1 | Status: SHIPPED | OUTPATIENT
Start: 2024-01-16

## 2024-01-16 NOTE — PROGRESS NOTES
Assessment/Plan:             1. Mixed hyperlipidemia  -     Comprehensive metabolic panel; Future  -     Lipid Panel with Direct LDL reflex; Future  -     TSH, 3rd generation; Future    2. Gastroesophageal reflux disease without esophagitis  -     pantoprazole (PROTONIX) 40 mg tablet; Take 1 tablet (40 mg total) by mouth daily    3. Body mass index 28.0-28.9, adult  -     phentermine 15 MG capsule; Take 1 capsule (15 mg total) by mouth daily    4. Impaired fasting blood sugar  -     CBC and differential; Future  -     Hemoglobin A1C; Future           Subjective:      Patient ID: Cierra Ramírez is a 38 y.o. female.    Follow-up on multiple medical problems to ensure they are stable on current medications        The following portions of the patient's history were reviewed and updated as appropriate: She  has a past medical history of Abnormal Pap smear of cervix, Allergic, Allergic rhinitis, Anxiety, Anxiety and depression, Chronic post-traumatic stress disorder (PTSD), GERD (gastroesophageal reflux disease), HPV (human papilloma virus) infection, colposcopy with cervical biopsy, Hyperlipidemia, and Migraine.  She   Patient Active Problem List    Diagnosis Date Noted    Low back pain at multiple sites 07/07/2023    Impaired fasting blood sugar 03/15/2023    Medical marijuana use 02/27/2023    Breast pain 02/02/2023    Dysthymic disorder 11/09/2022    COVID-19 virus detected 08/18/2022    Menorrhagia with regular cycle 08/11/2022    Body mass index 28.0-28.9, adult 02/22/2021    Gastroesophageal reflux disease without esophagitis     Mixed hyperlipidemia     Annual physical exam 06/29/2020    S/P dilation and curettage 01/06/2020    Endometrial polyp 11/20/2019    Abnormal uterine bleeding 11/20/2019    Anxiety 04/05/2019    Premenstrual syndrome 03/24/2017     She  has a past surgical history that includes Colposcopy; Contraceptive capsule removal; pr hysteroscopy bx endometrium&/polypc w/wo d&c (N/A,  01/06/2020); Tonsillectomy; pr hysteroscopy bx endometrium&/polypc w/wo d&c (N/A, 08/11/2022); pr hysteroscopy endometrial ablation (N/A, 08/11/2022); pr removal intrauterine device iud (N/A, 08/11/2022); Upper gastrointestinal endoscopy; and US breast cyst aspiration left initial (Left, 3/1/2023).  Her family history includes Alcohol abuse in her father; Arthritis in her maternal grandmother and paternal grandmother; Breast cancer (age of onset: 48) in her cousin; Colon cancer (age of onset: 34) in her maternal uncle; Dementia in her paternal grandmother; Depression in her father; Diabetes in her maternal grandfather, maternal grandmother, and paternal grandfather; Glaucoma in her paternal grandmother; Hearing loss in her paternal grandfather; Heart disease in her father, maternal grandmother, mother, and paternal grandmother; Hyperlipidemia in her father and mother; Hypertension in her maternal grandfather, maternal grandmother, paternal grandfather, and paternal grandmother; Mental illness in her maternal grandfather; No Known Problems in her maternal aunt and paternal aunt.  She  reports that she has never smoked. She has never used smokeless tobacco. She reports current alcohol use of about 6.0 standard drinks of alcohol per week. She reports current drug use. Frequency: 7.00 times per week. Drug: Marijuana.  Current Outpatient Medications   Medication Sig Dispense Refill    ALPRAZolam (XANAX) 0.5 mg tablet Take 1 tablet (0.5 mg total) by mouth daily at bedtime as needed for anxiety 30 tablet 0    ascorbic acid (VITAMIN C) 500 mg tablet Take 500 mg by mouth daily      BL EVENING PRIMROSE OIL PO Take by mouth      cyclobenzaprine (FLEXERIL) 10 mg tablet Take 1 tablet (10 mg total) by mouth 3 (three) times a day as needed for muscle spasms 30 tablet 0    ibuprofen (MOTRIN) 200 mg tablet Take 3 tablets (600 mg total) by mouth every 6 (six) hours as needed for cramping  0    Multiple Vitamin (MULTI-VITAMIN DAILY)  TABS Take by mouth daily after breakfast      pantoprazole (PROTONIX) 40 mg tablet Take 1 tablet (40 mg total) by mouth daily 90 tablet 3    phentermine 15 MG capsule Take 1 capsule (15 mg total) by mouth daily 90 capsule 1    Probiotic Product (PROBIOTIC-10) CAPS Take by mouth daily       promethazine (PHENERGAN) 25 mg tablet Take 1 tablet (25 mg total) by mouth every 6 (six) hours as needed for nausea or vomiting 30 tablet 0    sucralfate (CARAFATE) 1 g tablet Take 1 tablet (1 g total) by mouth 4 (four) times a day (Patient taking differently: Take 1 g by mouth 4 (four) times a day as needed) 120 tablet 1     No current facility-administered medications for this visit.     Current Outpatient Medications on File Prior to Visit   Medication Sig    ALPRAZolam (XANAX) 0.5 mg tablet Take 1 tablet (0.5 mg total) by mouth daily at bedtime as needed for anxiety    ascorbic acid (VITAMIN C) 500 mg tablet Take 500 mg by mouth daily    BL EVENING PRIMROSE OIL PO Take by mouth    cyclobenzaprine (FLEXERIL) 10 mg tablet Take 1 tablet (10 mg total) by mouth 3 (three) times a day as needed for muscle spasms    ibuprofen (MOTRIN) 200 mg tablet Take 3 tablets (600 mg total) by mouth every 6 (six) hours as needed for cramping    Multiple Vitamin (MULTI-VITAMIN DAILY) TABS Take by mouth daily after breakfast    Probiotic Product (PROBIOTIC-10) CAPS Take by mouth daily     promethazine (PHENERGAN) 25 mg tablet Take 1 tablet (25 mg total) by mouth every 6 (six) hours as needed for nausea or vomiting    sucralfate (CARAFATE) 1 g tablet Take 1 tablet (1 g total) by mouth 4 (four) times a day (Patient taking differently: Take 1 g by mouth 4 (four) times a day as needed)    [DISCONTINUED] pantoprazole (PROTONIX) 40 mg tablet TAKE 1 TABLET BY MOUTH EVERY DAY    [DISCONTINUED] phentermine 15 MG capsule Take 1 capsule (15 mg total) by mouth daily     No current facility-administered medications on file prior to visit.     She has No Known  "Allergies..    Review of Systems   Constitutional:  Negative for chills and fever.   HENT:  Negative for congestion, ear pain and sore throat.    Eyes:  Negative for pain.   Respiratory:  Negative for cough and shortness of breath.    Cardiovascular:  Negative for chest pain and leg swelling.   Gastrointestinal:  Negative for abdominal pain, nausea and vomiting.   Endocrine: Negative for polyuria.   Genitourinary:  Negative for difficulty urinating, frequency and urgency.   Musculoskeletal:  Negative for arthralgias and back pain.   Skin:  Negative for rash.   Neurological:  Negative for weakness and headaches.   Psychiatric/Behavioral:  Negative for sleep disturbance. The patient is not nervous/anxious.          Objective:      /70 (BP Location: Left arm, Patient Position: Sitting, Cuff Size: Standard)   Pulse 72   Temp 97.8 °F (36.6 °C) (Temporal)   Ht 5' 6\" (1.676 m)   Wt 78 kg (172 lb)   SpO2 97%   BMI 27.76 kg/m²     No results found for this or any previous visit (from the past 1344 hour(s)).     Physical Exam  Constitutional:       Appearance: Normal appearance.   HENT:      Head: Normocephalic.      Right Ear: External ear normal.      Left Ear: External ear normal.      Nose: Nose normal. No congestion.      Mouth/Throat:      Mouth: Mucous membranes are moist.      Pharynx: Oropharynx is clear. No oropharyngeal exudate or posterior oropharyngeal erythema.   Eyes:      Extraocular Movements: Extraocular movements intact.      Conjunctiva/sclera: Conjunctivae normal.   Cardiovascular:      Rate and Rhythm: Normal rate and regular rhythm.      Heart sounds: Normal heart sounds. No murmur heard.  Pulmonary:      Effort: Pulmonary effort is normal.      Breath sounds: Normal breath sounds. No wheezing or rales.   Abdominal:      General: There is no distension.      Palpations: Abdomen is soft.      Tenderness: There is no abdominal tenderness.   Musculoskeletal:         General: Normal range of " motion.      Cervical back: Normal range of motion and neck supple.      Right lower leg: No edema.      Left lower leg: No edema.   Lymphadenopathy:      Cervical: No cervical adenopathy.   Skin:     General: Skin is warm.   Neurological:      General: No focal deficit present.      Mental Status: She is alert and oriented to person, place, and time.

## 2024-03-03 DIAGNOSIS — K21.9 GASTROESOPHAGEAL REFLUX DISEASE WITHOUT ESOPHAGITIS: ICD-10-CM

## 2024-03-04 RX ORDER — SUCRALFATE 1 G/1
1 TABLET ORAL 4 TIMES DAILY PRN
Qty: 120 TABLET | Refills: 0 | Status: SHIPPED | OUTPATIENT
Start: 2024-03-04

## 2024-04-09 DIAGNOSIS — Z00.6 ENCOUNTER FOR EXAMINATION FOR NORMAL COMPARISON OR CONTROL IN CLINICAL RESEARCH PROGRAM: ICD-10-CM

## 2024-04-11 ENCOUNTER — TELEPHONE (OUTPATIENT)
Dept: GASTROENTEROLOGY | Facility: CLINIC | Age: 39
End: 2024-04-11

## 2024-04-12 ENCOUNTER — APPOINTMENT (OUTPATIENT)
Dept: LAB | Age: 39
End: 2024-04-12

## 2024-04-12 DIAGNOSIS — Z00.6 ENCOUNTER FOR EXAMINATION FOR NORMAL COMPARISON OR CONTROL IN CLINICAL RESEARCH PROGRAM: ICD-10-CM

## 2024-04-12 PROCEDURE — 36415 COLL VENOUS BLD VENIPUNCTURE: CPT

## 2024-04-22 DIAGNOSIS — M54.50 LOW BACK PAIN AT MULTIPLE SITES: ICD-10-CM

## 2024-04-22 RX ORDER — CYCLOBENZAPRINE HCL 10 MG
10 TABLET ORAL 3 TIMES DAILY PRN
Qty: 30 TABLET | Refills: 0 | Status: SHIPPED | OUTPATIENT
Start: 2024-04-22

## 2024-05-22 LAB
APOB+LDLR+PCSK9 GENE MUT ANL BLD/T: NOT DETECTED
BRCA1+BRCA2 DEL+DUP + FULL MUT ANL BLD/T: NOT DETECTED
MLH1+MSH2+MSH6+PMS2 GN DEL+DUP+FUL M: NOT DETECTED

## 2024-06-10 ENCOUNTER — ANNUAL EXAM (OUTPATIENT)
Dept: OBGYN CLINIC | Facility: CLINIC | Age: 39
End: 2024-06-10
Payer: COMMERCIAL

## 2024-06-10 VITALS — BODY MASS INDEX: 28.57 KG/M2 | SYSTOLIC BLOOD PRESSURE: 118 MMHG | DIASTOLIC BLOOD PRESSURE: 82 MMHG | WEIGHT: 177 LBS

## 2024-06-10 DIAGNOSIS — Z12.31 ENCOUNTER FOR SCREENING MAMMOGRAM FOR MALIGNANT NEOPLASM OF BREAST: ICD-10-CM

## 2024-06-10 DIAGNOSIS — Z01.411 ENCOUNTER FOR GYNECOLOGICAL EXAMINATION WITH ABNORMAL FINDING: Primary | ICD-10-CM

## 2024-06-10 DIAGNOSIS — Z01.419 PAP SMEAR, AS PART OF ROUTINE GYNECOLOGICAL EXAMINATION: ICD-10-CM

## 2024-06-10 PROCEDURE — S0612 ANNUAL GYNECOLOGICAL EXAMINA: HCPCS | Performed by: OBSTETRICS & GYNECOLOGY

## 2024-06-10 NOTE — PROGRESS NOTES
Assessment/Plan:    No problem-specific Assessment & Plan notes found for this encounter.       Diagnoses and all orders for this visit:    Encounter for gynecological examination with abnormal finding  -     Thinprep Tis Pap Reflex HPV mRNA E6/E7; Future    Pap smear, as part of routine gynecological examination    Encounter for screening mammogram for malignant neoplasm of breast  -     Mammo screening bilateral w 3d & cad; Future          Normal gynecological physical examination.  Self-breast examination stressed.  Mammogram ordered.  Discussed regular exercise, healthy diet, importance of vitamin D and calcium supplements.  Discussed importance of sun block use during periods of prolonged sun exposure.  Patient will be seen in 1 year for routine gynecologic and medical examination.  Patient will call office for any problems, concerns, or issues which may arise during the interim.     Subjective:          HPI    Patient ID: Cierra Ramírez is a 38 y.o. female who presents today for her annual gynecologic and medical examination    Menstrual status: Patient no longer having any menstrual cycles status post endometrial ablation    Vasomotor symptoms: Denies significant vasomotor symptoms    Patient reports normal appetite    Patient reports normal bowel and bladder habits    Patient denies any significant pelvic or abdominal pain    Patient denies any headaches, chest pain, shortness of breath fever shakes or chills    Patient denies any COVID 19 symptoms including cough or loss of taste or smell    COVID vaccine status: Aware of COVID vaccination protocols    Medical problems: No significant medical problems    Colonoscopy status: Not eligible    Mammogram status: Importance of self breast examination stressed to the patient at today's visit.  Appropriate arrangements for her initial screening mammogram were placed into the EMR system at today's visit.    The following portions of the patient's history were  reviewed and updated as appropriate: allergies, current medications, past family history, past medical history, past social history, past surgical history and problem list.    Review of Systems   Constitutional: Negative.  Negative for appetite change, diaphoresis, fatigue, fever and unexpected weight change.   HENT: Negative.     Eyes: Negative.    Respiratory: Negative.     Cardiovascular: Negative.    Gastrointestinal: Negative.  Negative for abdominal pain, blood in stool, constipation, diarrhea, nausea and vomiting.   Endocrine: Negative.  Negative for cold intolerance and heat intolerance.   Genitourinary: Negative.  Negative for dysuria, frequency, hematuria, urgency, vaginal bleeding, vaginal discharge and vaginal pain.   Musculoskeletal: Negative.    Skin: Negative.    Allergic/Immunologic: Negative.    Neurological: Negative.    Hematological: Negative.  Negative for adenopathy.   Psychiatric/Behavioral: Negative.           Objective:      /82   Wt 80.3 kg (177 lb)   BMI 28.57 kg/m²          Physical Exam  Constitutional:       General: She is not in acute distress.     Appearance: Normal appearance. She is well-developed. She is not diaphoretic.   HENT:      Head: Normocephalic and atraumatic.   Eyes:      Pupils: Pupils are equal, round, and reactive to light.   Cardiovascular:      Rate and Rhythm: Normal rate and regular rhythm.      Heart sounds: Normal heart sounds. No murmur heard.     No friction rub. No gallop.   Pulmonary:      Effort: Pulmonary effort is normal.      Breath sounds: Normal breath sounds.   Chest:   Breasts:     Breasts are symmetrical.      Right: No inverted nipple, mass, nipple discharge, skin change or tenderness.      Left: No inverted nipple, mass, nipple discharge, skin change or tenderness.   Abdominal:      General: Bowel sounds are normal.      Palpations: Abdomen is soft.   Genitourinary:     General: Normal vulva.      Exam position: Supine.      Labia:          Right: No rash or lesion.         Left: No rash or lesion.       Vagina: Normal. No vaginal discharge, erythema, tenderness or bleeding.      Cervix: No discharge or friability.      Uterus: Not enlarged and not tender.       Adnexa:         Right: No mass, tenderness or fullness.          Left: No mass, tenderness or fullness.     Musculoskeletal:         General: Normal range of motion.      Cervical back: Normal range of motion and neck supple.   Lymphadenopathy:      Cervical: No cervical adenopathy.      Upper Body:      Right upper body: No supraclavicular adenopathy.      Left upper body: No supraclavicular adenopathy.   Skin:     General: Skin is warm and dry.      Findings: No rash.   Neurological:      General: No focal deficit present.      Mental Status: She is alert and oriented to person, place, and time.   Psychiatric:         Mood and Affect: Mood normal.         Speech: Speech normal.         Behavior: Behavior normal.         Thought Content: Thought content normal.         Judgment: Judgment normal.

## 2024-06-12 LAB
CLINICAL INFO: NORMAL
CYTO CVX: NORMAL
CYTOLOGY CMNT CVX/VAG CYTO-IMP: NORMAL
DATE PREVIOUS BX: NORMAL
LMP START DATE: NORMAL
SL AMB PREV. PAP:: NORMAL
SPECIMEN SOURCE CVX/VAG CYTO: NORMAL

## 2024-07-18 RX ORDER — PHENTERMINE HYDROCHLORIDE 15 MG/1
15 CAPSULE ORAL DAILY
Qty: 90 CAPSULE | Refills: 0 | Status: SHIPPED | OUTPATIENT
Start: 2024-07-18

## 2024-07-21 RX ORDER — PHENTERMINE HYDROCHLORIDE 15 MG/1
15 CAPSULE ORAL DAILY
Qty: 90 CAPSULE | Refills: 0 | Status: SHIPPED | OUTPATIENT
Start: 2024-07-21 | End: 2024-08-02

## 2024-07-23 LAB
ALBUMIN SERPL-MCNC: 4.1 G/DL (ref 3.6–5.1)
ALBUMIN/GLOB SERPL: 1.5 (CALC) (ref 1–2.5)
ALP SERPL-CCNC: 70 U/L (ref 31–125)
ALT SERPL-CCNC: 18 U/L (ref 6–29)
AST SERPL-CCNC: 13 U/L (ref 10–30)
BASOPHILS # BLD AUTO: 87 CELLS/UL (ref 0–200)
BASOPHILS NFR BLD AUTO: 1.1 %
BILIRUB SERPL-MCNC: 0.5 MG/DL (ref 0.2–1.2)
BUN SERPL-MCNC: 9 MG/DL (ref 7–25)
BUN/CREAT SERPL: NORMAL (CALC) (ref 6–22)
CALCIUM SERPL-MCNC: 9.6 MG/DL (ref 8.6–10.2)
CHLORIDE SERPL-SCNC: 104 MMOL/L (ref 98–110)
CHOLEST SERPL-MCNC: 221 MG/DL
CHOLEST/HDLC SERPL: 4.7 (CALC)
CO2 SERPL-SCNC: 28 MMOL/L (ref 20–32)
CREAT SERPL-MCNC: 0.74 MG/DL (ref 0.5–0.97)
EOSINOPHIL # BLD AUTO: 253 CELLS/UL (ref 15–500)
EOSINOPHIL NFR BLD AUTO: 3.2 %
ERYTHROCYTE [DISTWIDTH] IN BLOOD BY AUTOMATED COUNT: 13.1 % (ref 11–15)
GFR/BSA.PRED SERPLBLD CYS-BASED-ARV: 105 ML/MIN/1.73M2
GLOBULIN SER CALC-MCNC: 2.7 G/DL (CALC) (ref 1.9–3.7)
GLUCOSE SERPL-MCNC: 91 MG/DL (ref 65–99)
HBA1C MFR BLD: 5.7 % OF TOTAL HGB
HCT VFR BLD AUTO: 41 % (ref 35–45)
HDLC SERPL-MCNC: 47 MG/DL
HGB BLD-MCNC: 13.2 G/DL (ref 11.7–15.5)
LDLC SERPL CALC-MCNC: 150 MG/DL (CALC)
LYMPHOCYTES # BLD AUTO: 2805 CELLS/UL (ref 850–3900)
LYMPHOCYTES NFR BLD AUTO: 35.5 %
MCH RBC QN AUTO: 26.7 PG (ref 27–33)
MCHC RBC AUTO-ENTMCNC: 32.2 G/DL (ref 32–36)
MCV RBC AUTO: 83 FL (ref 80–100)
MONOCYTES # BLD AUTO: 735 CELLS/UL (ref 200–950)
MONOCYTES NFR BLD AUTO: 9.3 %
NEUTROPHILS # BLD AUTO: 4021 CELLS/UL (ref 1500–7800)
NEUTROPHILS NFR BLD AUTO: 50.9 %
NONHDLC SERPL-MCNC: 174 MG/DL (CALC)
PLATELET # BLD AUTO: 465 THOUSAND/UL (ref 140–400)
PMV BLD REES-ECKER: 9.5 FL (ref 7.5–12.5)
POTASSIUM SERPL-SCNC: 4.9 MMOL/L (ref 3.5–5.3)
PROT SERPL-MCNC: 6.8 G/DL (ref 6.1–8.1)
RBC # BLD AUTO: 4.94 MILLION/UL (ref 3.8–5.1)
SODIUM SERPL-SCNC: 138 MMOL/L (ref 135–146)
TRIGL SERPL-MCNC: 119 MG/DL
TSH SERPL-ACNC: 0.63 MIU/L
WBC # BLD AUTO: 7.9 THOUSAND/UL (ref 3.8–10.8)

## 2024-08-02 ENCOUNTER — OFFICE VISIT (OUTPATIENT)
Dept: INTERNAL MEDICINE CLINIC | Facility: CLINIC | Age: 39
End: 2024-08-02
Payer: COMMERCIAL

## 2024-08-02 VITALS
WEIGHT: 177 LBS | HEART RATE: 75 BPM | DIASTOLIC BLOOD PRESSURE: 74 MMHG | TEMPERATURE: 98.2 F | SYSTOLIC BLOOD PRESSURE: 122 MMHG | HEIGHT: 66 IN | BODY MASS INDEX: 28.45 KG/M2 | OXYGEN SATURATION: 100 %

## 2024-08-02 DIAGNOSIS — E78.2 MIXED HYPERLIPIDEMIA: ICD-10-CM

## 2024-08-02 DIAGNOSIS — R73.01 IMPAIRED FASTING BLOOD SUGAR: Primary | ICD-10-CM

## 2024-08-02 DIAGNOSIS — F34.1 DYSTHYMIC DISORDER: ICD-10-CM

## 2024-08-02 DIAGNOSIS — K21.9 GASTROESOPHAGEAL REFLUX DISEASE WITHOUT ESOPHAGITIS: ICD-10-CM

## 2024-08-02 PROCEDURE — 99214 OFFICE O/P EST MOD 30 MIN: CPT | Performed by: INTERNAL MEDICINE

## 2024-08-02 RX ORDER — PHENTERMINE HYDROCHLORIDE 30 MG/1
30 CAPSULE ORAL EVERY MORNING
Qty: 90 CAPSULE | Refills: 0 | Status: SHIPPED | OUTPATIENT
Start: 2024-08-02

## 2024-08-02 NOTE — PROGRESS NOTES
Assessment/Plan:             1. Impaired fasting blood sugar  Comments:  Diet and exercise discussed  2. Body mass index 28.0-28.9, adult  -     phentermine 30 MG capsule; Take 1 capsule (30 mg total) by mouth every morning  3. Dysthymic disorder  4. Gastroesophageal reflux disease without esophagitis  Comments:  continue same med  5. Mixed hyperlipidemia  Comments:  Diet and exercise discussed         Subjective:      Patient ID: Cierra Ramírez is a 39 y.o. female.    Follow-up on blood test done on 7/22/2024 test discussed with her        The following portions of the patient's history were reviewed and updated as appropriate: She  has a past medical history of Abnormal Pap smear of cervix, Allergic, Allergic rhinitis, Anxiety, Anxiety and depression, Chronic post-traumatic stress disorder (PTSD), GERD (gastroesophageal reflux disease), HPV (human papilloma virus) infection, colposcopy with cervical biopsy, Hyperlipidemia, and Migraine.  She   Patient Active Problem List    Diagnosis Date Noted    Low back pain at multiple sites 07/07/2023    Impaired fasting blood sugar 03/15/2023    Medical marijuana use 02/27/2023    Breast pain 02/02/2023    Dysthymic disorder 11/09/2022    COVID-19 virus detected 08/18/2022    Menorrhagia with regular cycle 08/11/2022    Body mass index 28.0-28.9, adult 02/22/2021    Gastroesophageal reflux disease without esophagitis     Mixed hyperlipidemia     Annual physical exam 06/29/2020    S/P dilation and curettage 01/06/2020    Endometrial polyp 11/20/2019    Abnormal uterine bleeding 11/20/2019    Anxiety 04/05/2019    Premenstrual syndrome 03/24/2017     She  has a past surgical history that includes Colposcopy; Contraceptive capsule removal; pr hysteroscopy bx endometrium&/polypc w/wo d&c (N/A, 01/06/2020); Tonsillectomy; pr hysteroscopy bx endometrium&/polypc w/wo d&c (N/A, 08/11/2022); pr hysteroscopy endometrial ablation (N/A, 08/11/2022); pr removal intrauterine device  iud (N/A, 08/11/2022); Upper gastrointestinal endoscopy; and US breast cyst aspiration left initial (Left, 3/1/2023).  Her family history includes Alcohol abuse in her father; Arthritis in her maternal grandmother and paternal grandmother; Breast cancer (age of onset: 48) in her cousin; Colon cancer (age of onset: 34) in her maternal uncle; Dementia in her paternal grandmother; Depression in her father; Diabetes in her maternal grandfather, maternal grandmother, and paternal grandfather; Glaucoma in her paternal grandmother; Hearing loss in her paternal grandfather; Heart disease in her father, maternal grandmother, mother, and paternal grandmother; Hyperlipidemia in her father and mother; Hypertension in her maternal grandfather, maternal grandmother, paternal grandfather, and paternal grandmother; Mental illness in her maternal grandfather; No Known Problems in her maternal aunt and paternal aunt.  She  reports that she has never smoked. She has never used smokeless tobacco. She reports current alcohol use of about 6.0 standard drinks of alcohol per week. She reports current drug use. Frequency: 7.00 times per week. Drug: Marijuana.  Current Outpatient Medications   Medication Sig Dispense Refill    ALPRAZolam (XANAX) 0.5 mg tablet Take 1 tablet (0.5 mg total) by mouth daily at bedtime as needed for anxiety 30 tablet 0    ascorbic acid (VITAMIN C) 500 mg tablet Take 500 mg by mouth daily      BL EVENING PRIMROSE OIL PO Take by mouth      cyclobenzaprine (FLEXERIL) 10 mg tablet Take 1 tablet (10 mg total) by mouth 3 (three) times a day as needed for muscle spasms 30 tablet 0    ibuprofen (MOTRIN) 200 mg tablet Take 3 tablets (600 mg total) by mouth every 6 (six) hours as needed for cramping  0    Multiple Vitamin (MULTI-VITAMIN DAILY) TABS Take by mouth daily after breakfast      pantoprazole (PROTONIX) 40 mg tablet Take 1 tablet (40 mg total) by mouth daily 90 tablet 3    phentermine 30 MG capsule Take 1 capsule  (30 mg total) by mouth every morning 90 capsule 0    Probiotic Product (PROBIOTIC-10) CAPS Take by mouth daily       promethazine (PHENERGAN) 25 mg tablet Take 1 tablet (25 mg total) by mouth every 6 (six) hours as needed for nausea or vomiting 30 tablet 0    sucralfate (CARAFATE) 1 g tablet Take 1 tablet (1 g total) by mouth 4 (four) times a day as needed (stomach pain) (Patient not taking: Reported on 8/2/2024) 120 tablet 0     No current facility-administered medications for this visit.     Current Outpatient Medications on File Prior to Visit   Medication Sig    ALPRAZolam (XANAX) 0.5 mg tablet Take 1 tablet (0.5 mg total) by mouth daily at bedtime as needed for anxiety    ascorbic acid (VITAMIN C) 500 mg tablet Take 500 mg by mouth daily    BL EVENING PRIMROSE OIL PO Take by mouth    cyclobenzaprine (FLEXERIL) 10 mg tablet Take 1 tablet (10 mg total) by mouth 3 (three) times a day as needed for muscle spasms    ibuprofen (MOTRIN) 200 mg tablet Take 3 tablets (600 mg total) by mouth every 6 (six) hours as needed for cramping    Multiple Vitamin (MULTI-VITAMIN DAILY) TABS Take by mouth daily after breakfast    pantoprazole (PROTONIX) 40 mg tablet Take 1 tablet (40 mg total) by mouth daily    Probiotic Product (PROBIOTIC-10) CAPS Take by mouth daily     promethazine (PHENERGAN) 25 mg tablet Take 1 tablet (25 mg total) by mouth every 6 (six) hours as needed for nausea or vomiting    [DISCONTINUED] phentermine 15 MG capsule TAKE 1 CAPSULE BY MOUTH EVERY DAY    [DISCONTINUED] phentermine 15 MG capsule Take 1 capsule (15 mg total) by mouth daily    sucralfate (CARAFATE) 1 g tablet Take 1 tablet (1 g total) by mouth 4 (four) times a day as needed (stomach pain) (Patient not taking: Reported on 8/2/2024)     No current facility-administered medications on file prior to visit.     She has No Known Allergies..    Review of Systems   Constitutional:  Negative for chills and fever.   HENT:  Negative for congestion, ear pain  "and sore throat.    Eyes:  Negative for pain.   Respiratory:  Negative for cough and shortness of breath.    Cardiovascular:  Negative for chest pain and leg swelling.   Gastrointestinal:  Negative for abdominal pain, nausea and vomiting.   Endocrine: Negative for polyuria.   Genitourinary:  Negative for difficulty urinating, frequency and urgency.   Musculoskeletal:  Negative for arthralgias and back pain.   Skin:  Negative for rash.   Neurological:  Negative for weakness and headaches.   Psychiatric/Behavioral:  Negative for sleep disturbance. The patient is not nervous/anxious.          Objective:      /74 (BP Location: Left arm, Patient Position: Sitting, Cuff Size: Large)   Pulse 75   Temp 98.2 °F (36.8 °C) (Temporal)   Ht 5' 6\" (1.676 m)   Wt 80.3 kg (177 lb)   SpO2 100%   BMI 28.57 kg/m²     Recent Results (from the past 1344 hour(s))   Thinprep Tis Pap Reflex HPV mRNA E6/E7    Collection Time: 06/10/24  9:03 AM   Result Value Ref Range    [facility] CLINICAL INFORMATION None given     LMP None given     Prev. PAP None given     Prev. BX None given     Source Cervix     Statement of Adequacy      Interpretation/Result       Cytology Results: Negative for intraepithelial lesion or malignancy.    Comment       This Pap test has been evaluated with computer assisted technology.    [facility] CYTOTECHNOLOGIST:      Comment     Lipid Panel with Direct LDL reflex    Collection Time: 07/22/24  7:08 AM   Result Value Ref Range    Total Cholesterol 221 (H) <200 mg/dL    HDL 47 (L) > OR = 50 mg/dL    Triglycerides 119 <150 mg/dL    LDL Calculated 150 (H) mg/dL (calc)    Chol HDLC Ratio 4.7 <5.0 (calc)    Non-HDL Cholesterol 174 (H) <130 mg/dL (calc)   Comprehensive metabolic panel    Collection Time: 07/22/24  7:08 AM   Result Value Ref Range    Glucose, Random 91 65 - 99 mg/dL    BUN 9 7 - 25 mg/dL    Creatinine 0.74 0.50 - 0.97 mg/dL    eGFR 105 > OR = 60 mL/min/1.73m2    [facility] BUN/CREATININE RATIO SEE NOTE: 6 - " 22 (calc)    Sodium 138 135 - 146 mmol/L    Potassium 4.9 3.5 - 5.3 mmol/L    Chloride 104 98 - 110 mmol/L    CO2 28 20 - 32 mmol/L    Calcium 9.6 8.6 - 10.2 mg/dL    Protein, Total 6.8 6.1 - 8.1 g/dL    Albumin 4.1 3.6 - 5.1 g/dL    Globulin 2.7 1.9 - 3.7 g/dL (calc)    Albumin/Globulin Ratio 1.5 1.0 - 2.5 (calc)    TOTAL BILIRUBIN 0.5 0.2 - 1.2 mg/dL    Alkaline Phosphatase 70 31 - 125 U/L    AST 13 10 - 30 U/L    ALT 18 6 - 29 U/L   CBC and differential    Collection Time: 07/22/24  7:08 AM   Result Value Ref Range    White Blood Cell Count 7.9 3.8 - 10.8 Thousand/uL    Red Blood Cell Count 4.94 3.80 - 5.10 Million/uL    Hemoglobin 13.2 11.7 - 15.5 g/dL    HCT 41.0 35.0 - 45.0 %    MCV 83.0 80.0 - 100.0 fL    MCH 26.7 (L) 27.0 - 33.0 pg    MCHC 32.2 32.0 - 36.0 g/dL    RDW 13.1 11.0 - 15.0 %    Platelet Count 465 (H) 140 - 400 Thousand/uL    SL AMB MPV 9.5 7.5 - 12.5 fL    Neutrophils (Absolute) 4,021 1,500 - 7,800 cells/uL    Lymphocytes (Absolute) 2,805 850 - 3,900 cells/uL    Monocytes (Absolute) 735 200 - 950 cells/uL    Eosinophils (Absolute) 253 15 - 500 cells/uL    Basophils ABS 87 0 - 200 cells/uL    Neutrophils 50.9 %    Lymphocytes 35.5 %    Monocytes 9.3 %    Eosinophils 3.2 %    Basophils PCT 1.1 %   TSH, 3rd generation    Collection Time: 07/22/24  7:08 AM   Result Value Ref Range    TSH 0.63 mIU/L   Hemoglobin A1c (w/out EAG)    Collection Time: 07/22/24  7:08 AM   Result Value Ref Range    Hemoglobin A1C 5.7 (H) <5.7 % of total Hgb        Physical Exam  Constitutional:       Appearance: Normal appearance.   HENT:      Head: Normocephalic.      Right Ear: Tympanic membrane, ear canal and external ear normal.      Left Ear: Tympanic membrane, ear canal and external ear normal.      Nose: Nose normal. No congestion.      Mouth/Throat:      Mouth: Mucous membranes are moist.      Pharynx: Oropharynx is clear. No oropharyngeal exudate or posterior oropharyngeal erythema.   Eyes:      Extraocular  Movements: Extraocular movements intact.      Conjunctiva/sclera: Conjunctivae normal.   Cardiovascular:      Rate and Rhythm: Normal rate and regular rhythm.      Heart sounds: Normal heart sounds. No murmur heard.  Pulmonary:      Effort: Pulmonary effort is normal.      Breath sounds: Normal breath sounds. No wheezing or rales.   Abdominal:      General: Abdomen is flat. There is no distension.      Palpations: Abdomen is soft.      Tenderness: There is no abdominal tenderness.   Musculoskeletal:         General: Normal range of motion.      Cervical back: Normal range of motion and neck supple.      Right lower leg: No edema.      Left lower leg: No edema.   Lymphadenopathy:      Cervical: No cervical adenopathy.   Skin:     General: Skin is warm.   Neurological:      General: No focal deficit present.      Mental Status: She is alert and oriented to person, place, and time.          none of the above

## 2024-08-26 ENCOUNTER — OFFICE VISIT (OUTPATIENT)
Dept: GASTROENTEROLOGY | Facility: AMBULARY SURGERY CENTER | Age: 39
End: 2024-08-26
Payer: COMMERCIAL

## 2024-08-26 VITALS
SYSTOLIC BLOOD PRESSURE: 116 MMHG | HEIGHT: 66 IN | WEIGHT: 177 LBS | HEART RATE: 78 BPM | OXYGEN SATURATION: 99 % | BODY MASS INDEX: 28.45 KG/M2 | DIASTOLIC BLOOD PRESSURE: 82 MMHG

## 2024-08-26 DIAGNOSIS — K21.9 GASTROESOPHAGEAL REFLUX DISEASE WITHOUT ESOPHAGITIS: ICD-10-CM

## 2024-08-26 PROCEDURE — 99213 OFFICE O/P EST LOW 20 MIN: CPT | Performed by: PHYSICIAN ASSISTANT

## 2024-08-26 RX ORDER — SUCRALFATE 1 G/1
1 TABLET ORAL 4 TIMES DAILY PRN
Qty: 120 TABLET | Refills: 1 | Status: SHIPPED | OUTPATIENT
Start: 2024-08-26

## 2024-08-26 RX ORDER — PANTOPRAZOLE SODIUM 40 MG/1
40 TABLET, DELAYED RELEASE ORAL DAILY
Qty: 90 TABLET | Refills: 3 | Status: SHIPPED | OUTPATIENT
Start: 2024-08-26

## 2024-08-26 NOTE — PROGRESS NOTES
Follow-up Note -  [facility]  Cierra Ramírez 1985 39 y.o. female         ASSESSMENT & PLAN:    Gastroesophageal reflux disease without esophagitis  Symptomatically relatively well-managed with PPI therapy at this time, no alarm symptoms currently, no evidence of Gonzalez's on last EGD in 2023    -Continue PPI therapy for now, Protonix once daily, advised patient about risks and benefits of long-term PPI therapy, would  that benefits outweigh risks in her case but I advised her that she can gradually taper off over the course of 3 to 6 weeks if she is doing well    -Advised patient about dietary and lifestyle modification strategies for the mitigation of GERD    -Follow-up in 1 year or sooner if needed      Reason: Follow-up; GERD    HPI: 39-year-old female with history of endometrial ablation who presents for follow-up, I last seen her in the office for follow-up of chronic GERD about a year ago in April 2023, her last EGD in February 2023 was unremarkable and showed no evidence of Gonzalez's.  At this time the patient says she continues to take Protonix once daily in the morning, and will take an additional Carafate on an as-needed basis, this is less frequent than it was in the past but currently amounts approximately once a week.  She correlates this with when she takes Aleve for back pain which is also about once a week.  Symptoms that prompted taking Carafate generally include burning sensation in the chest/heartburn.  She reports some weight gain lately, still on phentermine.  Denies any rectal bleeding or melena.  Denies any disturbances in her bowel habits or stool appearance.  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    REVIEW OF SYSTEMS:      CONSTITUTIONAL: Denies any fever, chills, or rigors. Good appetite, and no recent weight loss.  HEENT: No earache or tinnitus.  Denies hearing loss or visual disturbances.  CARDIOVASCULAR: No chest pain or palpitations.   RESPIRATORY: Denies any cough, hemoptysis, shortness of breath or dyspnea on exertion.  GASTROINTESTINAL: As noted in the History of Present Illness.   GENITOURINARY: No problems with urination. Denies any hematuria or dysuria.  NEUROLOGIC: No dizziness or vertigo, denies headaches.   MUSCULOSKELETAL: Denies any muscle or joint pain.   SKIN: Denies skin rashes or itching.   ENDOCRINE: Denies excessive thirst. Denies intolerance to heat or cold.  PSYCHOSOCIAL: Denies depression or anxiety. Denies any recent memory loss.     Past Medical History:   Diagnosis Date    Abnormal Pap smear of cervix     Allergic     Allergic rhinitis     Anxiety     Anxiety and depression     Chronic post-traumatic stress disorder (PTSD)     GERD (gastroesophageal reflux disease)     HPV (human papilloma virus) infection     Hx of colposcopy with cervical biopsy     Hyperlipidemia     Migraine       Past Surgical History:   Procedure Laterality Date    COLPOSCOPY      with biopsy with endocervical currettage     CONTRACEPTIVE CAPSULE REMOVAL      Last assessed: 4/1/2014    AR HYSTEROSCOPY BX ENDOMETRIUM&/POLYPC W/WO D&C N/A 01/06/2020    Procedure: DILATATION AND CURETTAGE (D&C) WITH HYSTEROSCOPY Polypectomy;  Surgeon: Karen Anthony MD;  Location: [facility];  Service: Gynecology    AR HYSTEROSCOPY BX ENDOMETRIUM&/POLYPC W/WO D&C N/A 08/11/2022    Procedure: DILATATION AND CURETTAGE (D&C) WITH HYSTEROSCOPY W/ NOVASURE;  Surgeon: Liudmila Daniels DO;  Location: [facility];  Service: Gynecology    AR HYSTEROSCOPY ENDOMETRIAL ABLATION N/A 08/11/2022    Procedure: ABLATION ENDOMETRIAL NOVASURE;  Surgeon: Liudmila Daniels DO;  Location: [facility];  Service: Gynecology    AR REMOVAL INTRAUTERINE DEVICE IUD N/A 08/11/2022    Procedure: REMOVAL OF INTRAUTERINE DEVICE (IUD);  Surgeon: Liudmila Daniels DO;  Location: [facility];  Service: Gynecology     TONSILLECTOMY      UPPER GASTROINTESTINAL ENDOSCOPY      US BREAST CYST ASPIRATION LEFT INITIAL Left 3/1/2023     Social History     Socioeconomic History    Marital status: /Civil Union     Spouse name: Not on file    Number of children: Not on file    Years of education: Not on file    Highest education level: Not on file   Occupational History    Occupation: Pharmacist    Tobacco Use    Smoking status: Never    Smokeless tobacco: Never   Vaping Use    Vaping status: Never Used   Substance and Sexual Activity    Alcohol use: Yes     Alcohol/week: 6.0 standard drinks of alcohol     Types: 3 Glasses of wine, 3 Cans of beer per week     Comment: occ    Drug use: Yes     Frequency: 7.0 times per week     Types: Marijuana     Comment: Medical Marijuana Patient    Sexual activity: Yes     Partners: Male     Birth control/protection: Condom Male   Other Topics Concern    Not on file   Social History Narrative    Home environment domestic violence - denied         Alcohol: Negative - As per [employer]      Social Determinants of Health     Financial Resource Strain: Not on file   Food Insecurity: Not on file   Transportation Needs: Not on file   Physical Activity: Not on file   Stress: Not on file   Social Connections: Not on file   Intimate Partner Violence: Not on file   Housing Stability: Not on file     Family History   Problem Relation Age of Onset    Hyperlipidemia Mother     Heart disease Mother     Heart disease Father     Hyperlipidemia Father     Alcohol abuse Father         10 years sober    Depression Father     Hypertension Maternal Grandmother     Heart disease Maternal Grandmother     Diabetes Maternal Grandmother     Arthritis Maternal Grandmother         Osteo    Mental illness Maternal Grandfather         PTSD - night terrors    Hypertension Maternal Grandfather     Diabetes Maternal Grandfather     Dementia Paternal Grandmother     Hypertension Paternal Grandmother     Heart disease Paternal  "Grandmother     Arthritis Paternal Grandmother         Rheumatoid    Glaucoma Paternal Grandmother     Hypertension Paternal Grandfather     Diabetes Paternal Grandfather     Hearing loss Paternal Grandfather     No Known Problems Maternal Aunt     Colon cancer Maternal Uncle 34    No Known Problems Paternal Aunt     Breast cancer Cousin 48     Patient has no known allergies.  Current Outpatient Medications   Medication Sig Dispense Refill    ALPRAZolam (XANAX) 0.5 mg tablet Take 1 tablet (0.5 mg total) by mouth daily at bedtime as needed for anxiety 30 tablet 0    ascorbic acid (VITAMIN C) 500 mg tablet Take 500 mg by mouth daily      BL EVENING PRIMROSE OIL PO Take by mouth      cyclobenzaprine (FLEXERIL) 10 mg tablet Take 1 tablet (10 mg total) by mouth 3 (three) times a day as needed for muscle spasms 30 tablet 0    ibuprofen (MOTRIN) 200 mg tablet Take 3 tablets (600 mg total) by mouth every 6 (six) hours as needed for cramping  0    Multiple Vitamin (MULTI-VITAMIN DAILY) TABS Take by mouth daily after breakfast      pantoprazole (PROTONIX) 40 mg tablet Take 1 tablet (40 mg total) by mouth daily 90 tablet 3    phentermine 30 MG capsule Take 1 capsule (30 mg total) by mouth every morning 90 capsule 0    Probiotic Product (PROBIOTIC-10) CAPS Take by mouth daily       promethazine (PHENERGAN) 25 mg tablet Take 1 tablet (25 mg total) by mouth every 6 (six) hours as needed for nausea or vomiting 30 tablet 0    sucralfate (CARAFATE) 1 g tablet Take 1 tablet (1 g total) by mouth 4 (four) times a day as needed (stomach pain) 120 tablet 1     No current facility-administered medications for this visit.       Blood pressure 116/82, pulse 78, height 5' 6\" (1.676 m), weight 80.3 kg (177 lb), SpO2 99%, not currently breastfeeding.    PHYSICAL EXAM:      General Appearance:   Alert, cooperative, no distress, appears stated age    HEENT:   Normocephalic, atraumatic, anicteric.     Neck:  Supple, symmetrical, trachea midline, " "no adenopathy;    thyroid: no enlargement/tenderness/nodules; no carotid  bruit or JVD    Lungs:   Clear to auscultation bilaterally; no rales, rhonchi or wheezing; respirations unlabored    Heart::   S1 and S2 normal; regular rate and rhythm; no murmur, rub, or gallop.   Abdomen:   Soft, non-tender, non-distended; normal bowel sounds; no masses, no organomegaly    Extremities: No edema, erythema, wounds, rashes   Rectal:   Deferred                      Lab Results   Component Value Date    WBC 7.9 07/22/2024    HGB 13.2 07/22/2024    HCT 41.0 07/22/2024    MCV 83.0 07/22/2024     (H) 07/22/2024     Lab Results   Component Value Date    CALCIUM 9.6 07/22/2024    K 4.9 07/22/2024    CO2 28 07/22/2024     07/22/2024    BUN 9 07/22/2024    CREATININE 0.74 07/22/2024     Lab Results   Component Value Date    ALT 18 07/22/2024    AST 13 07/22/2024    ALKPHOS 70 07/22/2024     No results found for: \"INR\", \"PROTIME\"    US breast left limited (diagnostic)    Result Date: 3/1/2023  Impression: 10 mm minimally complicated cyst at the left breast 1 o'clock position at the area of pain/palpable concern.  No suspicious finding or evidence of malignancy.  Patient requests cyst aspiration due to discomfort at this area. ASSESSMENT/BI-RADS CATEGORY: Left: 2 - Benign Overall: 2 - Benign RECOMMENDATION:      - Routine screening mammogram at age 39 for the left breast. Workstation ID: [id number]     Mammo diagnostic left w 3d & cad    Result Date: 3/1/2023  Impression: 10 mm minimally complicated cyst at the left breast 1 o'clock position at the area of pain/palpable concern.  No suspicious finding or evidence of malignancy.  Patient requests cyst aspiration due to discomfort at this area. ASSESSMENT/BI-RADS CATEGORY: Left: 2 - Benign Overall: 2 - Benign RECOMMENDATION:      - Routine screening mammogram at age 39 for the left breast. Workstation ID: [id number]     US BREAST CYST ASPIRATION LEFT INITIAL    Result " Date: 3/1/2023  Impression:  Technically successful left breast cyst aspiration. RECOMMENDATION:      - Routine screening mammogram at age 39 for the left breast. Workstation ID: [id number]

## 2024-08-26 NOTE — ASSESSMENT & PLAN NOTE
Symptomatically relatively well-managed with PPI therapy at this time, no alarm symptoms currently, no evidence of Gonzalez's on last EGD in 2023    -Continue PPI therapy for now, Protonix once daily, advised patient about risks and benefits of long-term PPI therapy, would  that benefits outweigh risks in her case but I advised her that she can gradually taper off over the course of 3 to 6 weeks if she is doing well    -Advised patient about dietary and lifestyle modification strategies for the mitigation of GERD    -Follow-up in 1 year or sooner if needed

## 2024-09-30 ENCOUNTER — HOSPITAL ENCOUNTER (OUTPATIENT)
Dept: RADIOLOGY | Age: 39
Discharge: HOME | End: 2024-09-30
Payer: COMMERCIAL

## 2024-09-30 VITALS — BODY MASS INDEX: 27.32 KG/M2 | WEIGHT: 170 LBS | HEIGHT: 66 IN

## 2024-09-30 DIAGNOSIS — Z12.31 ENCOUNTER FOR SCREENING MAMMOGRAM FOR MALIGNANT NEOPLASM OF BREAST: ICD-10-CM

## 2024-09-30 PROCEDURE — 77063 BREAST TOMOSYNTHESIS BI: CPT

## 2024-09-30 PROCEDURE — 77067 SCR MAMMO BI INCL CAD: CPT

## 2024-11-22 DIAGNOSIS — K21.9 GASTROESOPHAGEAL REFLUX DISEASE WITHOUT ESOPHAGITIS: ICD-10-CM

## 2024-11-25 RX ORDER — SUCRALFATE 1 G/1
TABLET ORAL
Qty: 360 TABLET | Refills: 1 | Status: SHIPPED | OUTPATIENT
Start: 2024-11-25

## 2024-12-10 RX ORDER — PHENTERMINE HYDROCHLORIDE 30 MG/1
30 CAPSULE ORAL EVERY MORNING
Qty: 90 CAPSULE | Refills: 0 | Status: SHIPPED | OUTPATIENT
Start: 2024-12-10

## 2025-01-12 DIAGNOSIS — M54.50 LOW BACK PAIN AT MULTIPLE SITES: ICD-10-CM

## 2025-01-13 RX ORDER — CYCLOBENZAPRINE HCL 10 MG
10 TABLET ORAL 3 TIMES DAILY PRN
Qty: 30 TABLET | Refills: 0 | Status: SHIPPED | OUTPATIENT
Start: 2025-01-13

## 2025-02-03 ENCOUNTER — OFFICE VISIT (OUTPATIENT)
Dept: INTERNAL MEDICINE CLINIC | Facility: CLINIC | Age: 40
End: 2025-02-03
Payer: COMMERCIAL

## 2025-02-03 VITALS
WEIGHT: 176 LBS | DIASTOLIC BLOOD PRESSURE: 76 MMHG | SYSTOLIC BLOOD PRESSURE: 118 MMHG | HEIGHT: 66 IN | BODY MASS INDEX: 28.28 KG/M2 | HEART RATE: 76 BPM | OXYGEN SATURATION: 98 % | TEMPERATURE: 98.3 F

## 2025-02-03 DIAGNOSIS — K21.9 GASTROESOPHAGEAL REFLUX DISEASE WITHOUT ESOPHAGITIS: Primary | ICD-10-CM

## 2025-02-03 DIAGNOSIS — R73.01 IMPAIRED FASTING BLOOD SUGAR: ICD-10-CM

## 2025-02-03 DIAGNOSIS — M54.50 LOW BACK PAIN AT MULTIPLE SITES: ICD-10-CM

## 2025-02-03 DIAGNOSIS — Z00.00 ANNUAL PHYSICAL EXAM: ICD-10-CM

## 2025-02-03 PROCEDURE — 99214 OFFICE O/P EST MOD 30 MIN: CPT | Performed by: INTERNAL MEDICINE

## 2025-02-03 PROCEDURE — 99396 PREV VISIT EST AGE 40-64: CPT | Performed by: INTERNAL MEDICINE

## 2025-02-03 NOTE — PATIENT INSTRUCTIONS
"Patient Education     Routine physical for adults   The Basics   Written by the doctors and editors at [facility]   What is a physical? -- A physical is a routine visit, or \"check-up,\" with your doctor. You might also hear it called a \"wellness visit\" or \"preventive visit.\"  During each visit, the doctor will:   Ask about your physical and mental health   Ask about your habits, behaviors, and lifestyle   Do an exam   Give you vaccines if needed   Talk to you about any medicines you take   Give advice about your health   Answer your questions  Getting regular check-ups is an important part of taking care of your health. It can help your doctor find and treat any problems you have. But it's also important for preventing health problems.  A routine physical is different from a \"sick visit.\" A sick visit is when you see a doctor because of a health concern or problem. Since physicals are scheduled ahead of time, you can think about what you want to ask the doctor.  How often should I get a physical? -- It depends on your age and health. In general, for people age 21 years and older:   If you are younger than 50 years, you might be able to get a physical every 3 years.   If you are 50 years or older, your doctor might recommend a physical every year.  If you have an ongoing health condition, like diabetes or high blood pressure, your doctor will probably want to see you more often.  What happens during a physical? -- In general, each visit will include:   Physical exam - The doctor or nurse will check your height, weight, heart rate, and blood pressure. They will also look at your eyes and ears. They will ask about how you are feeling and whether you have any symptoms that bother you.   Medicines - It's a good idea to bring a list of all the medicines you take to each doctor visit. Your doctor will talk to you about your medicines and answer any questions. Tell them if you are having any side effects that bother you. You " "should also tell them if you are having trouble paying for any of your medicines.   Habits and behaviors - This includes:   Your diet   Your exercise habits   Whether you smoke, drink alcohol, or use drugs   Whether you are sexually active   Whether you feel safe at home  Your doctor will talk to you about things you can do to improve your health and lower your risk of health problems. They will also offer help and support. For example, if you want to quit smoking, they can give you advice and might prescribe medicines. If you want to improve your diet or get more physical activity, they can help you with this, too.   Lab tests, if needed - The tests you get will depend on your age and situation. For example, your doctor might want to check your:   Cholesterol   Blood sugar   Iron level   Vaccines - The recommended vaccines will depend on your age, health, and what vaccines you already had. Vaccines are very important because they can prevent certain serious or deadly infections.   Discussion of screening - \"Screening\" means checking for diseases or other health problems before they cause symptoms. Your doctor can recommend screening based on your age, risk, and preferences. This might include tests to check for:   Cancer, such as breast, prostate, cervical, ovarian, colorectal, prostate, lung, or skin cancer   Sexually transmitted infections, such as chlamydia and gonorrhea   Mental health conditions like depression and anxiety  Your doctor will talk to you about the different types of screening tests. They can help you decide which screenings to have. They can also explain what the results might mean.   Answering questions - The physical is a good time to ask the doctor or nurse questions about your health. If needed, they can refer you to other doctors or specialists, too.  Adults older than 65 years often need other care, too. As you get older, your doctor will talk to you about:   How to prevent falling at " home   Hearing or vision tests   Memory testing   How to take your medicines safely   Making sure that you have the help and support you need at home  All topics are updated as new evidence becomes available and our peer review process is complete.  This topic retrieved from [employer] on: May 02, 2024.  Topic 352370 Version 1.0  Release: 32.4.3 - C32.122  © 2024 UpToDate, Inc. and/or its affiliates. All rights reserved.  Consumer Information Use and Disclaimer   Disclaimer: This generalized information is a limited summary of diagnosis, treatment, and/or medication information. It is not meant to be comprehensive and should be used as a tool to help the user understand and/or assess potential diagnostic and treatment options. It does NOT include all information about conditions, treatments, medications, side effects, or risks that may apply to a specific patient. It is not intended to be medical advice or a substitute for the medical advice, diagnosis, or treatment of a health care provider based on the health care provider's examination and assessment of a patient's specific and unique circumstances. Patients must speak with a health care provider for complete information about their health, medical questions, and treatment options, including any risks or benefits regarding use of medications. This information does not endorse any treatments or medications as safe, effective, or approved for treating a specific patient. UpToDate, Inc. and its affiliates disclaim any warranty or liability relating to this information or the use thereof.The use of this information is governed by the Terms of Use, available at https://www.[employer]ters[employer] Technologiesuwer.com/en/know/clinical-effectiveness-terms. 2024© UpToDate, Inc. and its affiliates and/or licensors. All rights reserved.  Copyright   © 2024 UpToDate, Inc. and/or its affiliates. All rights reserved.

## 2025-02-03 NOTE — PROGRESS NOTES
Adult Annual Physical  Name: Cierra Ramírez      : 1985      MRN: [id number]  Encounter Provider: Jayesh Dill MD  Encounter Date: 2/3/2025   Encounter department: [facility] INTER[facility]    Assessment & Plan  Gastroesophageal reflux disease without esophagitis         Impaired fasting blood sugar         Low back pain at multiple sites         Annual physical exam         Body mass index 28.0-28.9, adult           Immunizations and preventive care screenings were discussed with patient today. Appropriate education was printed on patient's after visit summary.    Counseling:  Exercise: the importance of regular exercise/physical activity was discussed. Recommend exercise 3-5 times per week for at least 30 minutes.          History of Present Illness     Adult Annual Physical:  Patient presents for annual physical. physical.     Diet and Physical Activity:  - Diet/Nutrition: well balanced diet.  - Exercise: moderate cardiovascular exercise.    Depression Screening:    - PHQ-9 Score: 8    General Health:  - Sleep: sleeps poorly.  - Hearing: normal hearing bilateral ears.  - Vision: no vision problems.  - Dental: regular dental visits.    /GYN Health:  - Follows with GYN: yes.   - History of STDs: no    Advanced Care Planning:  - Has an advanced directive?: no    - Has a durable medical POA?: yes    - ACP document given to patient?: yes      Review of Systems   Constitutional:  Negative for chills and fever.   HENT:  Negative for congestion, ear pain and sore throat.    Eyes:  Negative for pain.   Respiratory:  Negative for cough and shortness of breath.    Cardiovascular:  Negative for chest pain and leg swelling.   Gastrointestinal:  Negative for abdominal pain, nausea and vomiting.   Endocrine: Negative for polyuria.   Genitourinary:  Negative for difficulty urinating, frequency and urgency.   Musculoskeletal:  Negative for arthralgias and back pain.   Skin:  Negative  for rash.   Neurological:  Negative for weakness and headaches.   Psychiatric/Behavioral:  Negative for sleep disturbance. The patient is not nervous/anxious.      Current Outpatient Medications on File Prior to Visit   Medication Sig Dispense Refill    ALPRAZolam (XANAX) 0.5 mg tablet Take 1 tablet (0.5 mg total) by mouth daily at bedtime as needed for anxiety 30 tablet 0    ascorbic acid (VITAMIN C) 500 mg tablet Take 500 mg by mouth daily      BL EVENING PRIMROSE OIL PO Take by mouth      cyclobenzaprine (FLEXERIL) 10 mg tablet Take 1 tablet (10 mg total) by mouth 3 (three) times a day as needed for muscle spasms 30 tablet 0    ibuprofen (MOTRIN) 200 mg tablet Take 3 tablets (600 mg total) by mouth every 6 (six) hours as needed for cramping  0    Multiple Vitamin (MULTI-VITAMIN DAILY) TABS Take by mouth daily after breakfast      pantoprazole (PROTONIX) 40 mg tablet Take 1 tablet (40 mg total) by mouth daily 90 tablet 3    phentermine 30 MG capsule Take 1 capsule (30 mg total) by mouth every morning 90 capsule 0    Probiotic Product (PROBIOTIC-10) CAPS Take by mouth daily       promethazine (PHENERGAN) 25 mg tablet Take 1 tablet (25 mg total) by mouth every 6 (six) hours as needed for nausea or vomiting 30 tablet 0    sucralfate (CARAFATE) 1 g tablet TAKE 1 TABLET BY MOUTH FOUR TIMES A DAY AS NEEDED FOR STOMACH PAIN 360 tablet 1     No current facility-administered medications on file prior to visit.      Social History     Tobacco Use    Smoking status: Never    Smokeless tobacco: Never   Vaping Use    Vaping status: Never Used   Substance and Sexual Activity    Alcohol use: Yes     Alcohol/week: 6.0 standard drinks of alcohol     Types: 3 Glasses of wine, 3 Cans of beer per week     Comment: occ    Drug use: Yes     Frequency: 7.0 times per week     Types: Marijuana     Comment: Medical Marijuana Patient    Sexual activity: Yes     Partners: Male     Birth control/protection: Condom Male       Objective   BP  "118/76 (BP Location: Left arm, Patient Position: Sitting, Cuff Size: Standard)   Pulse 76   Temp 98.3 °F (36.8 °C) (Temporal)   Ht 5' 6\" (1.676 m)   Wt 79.8 kg (176 lb)   SpO2 98%   BMI 28.41 kg/m²     Physical Exam  Vitals and nursing note reviewed.   Constitutional:       General: She is not in acute distress.     Appearance: She is well-developed.   HENT:      Head: Normocephalic and atraumatic.   Eyes:      Conjunctiva/sclera: Conjunctivae normal.   Cardiovascular:      Rate and Rhythm: Normal rate and regular rhythm.      Heart sounds: No murmur heard.  Pulmonary:      Effort: Pulmonary effort is normal. No respiratory distress.      Breath sounds: Normal breath sounds.   Abdominal:      Palpations: Abdomen is soft.      Tenderness: There is no abdominal tenderness.   Musculoskeletal:         General: No swelling.      Cervical back: Neck supple.   Skin:     General: Skin is warm and dry.      Capillary Refill: Capillary refill takes less than 2 seconds.   Neurological:      Mental Status: She is alert.   Psychiatric:         Mood and Affect: Mood normal.         "

## 2025-02-03 NOTE — PROGRESS NOTES
Assessment/Plan:      Depression Screening and Follow-up Plan: Patient's depression screening was positive with a PHQ-9 score of 8.   Patient with underlying depression and was advised to continue current medications as prescribed.           1. Gastroesophageal reflux disease without esophagitis  Comments:  continue same med  Assessment & Plan:         2. Impaired fasting blood sugar  Assessment & Plan:         3. Low back pain at multiple sites  Comments:  continue same med  Assessment & Plan:         4. Annual physical exam  Assessment & Plan:         5. Body mass index 28.0-28.9, adult  Comments:  continue same med  Assessment & Plan:                Subjective:      Patient ID: Cierra Ramírez is a 39 y.o. female.    Follow-up on multiple medical problems ensure they are stable on current medication, also physical        The following portions of the patient's history were reviewed and updated as appropriate: She  has a past medical history of Abnormal Pap smear of cervix, Allergic, Allergic rhinitis, Anxiety, Anxiety and depression, Chronic post-traumatic stress disorder (PTSD), GERD (gastroesophageal reflux disease), HPV (human papilloma virus) infection, colposcopy with cervical biopsy, Hyperlipidemia, and Migraine.  She   Patient Active Problem List    Diagnosis Date Noted    Low back pain at multiple sites 07/07/2023    Impaired fasting blood sugar 03/15/2023    Medical marijuana use 02/27/2023    Breast pain 02/02/2023    Dysthymic disorder 11/09/2022    COVID-19 virus detected 08/18/2022    Menorrhagia with regular cycle 08/11/2022    Body mass index 28.0-28.9, adult 02/22/2021    Gastroesophageal reflux disease without esophagitis     Mixed hyperlipidemia     Annual physical exam 06/29/2020    S/P dilation and curettage 01/06/2020    Endometrial polyp 11/20/2019    Abnormal uterine bleeding 11/20/2019    Anxiety 04/05/2019    Premenstrual syndrome 03/24/2017     She  has a past surgical history that  includes Colposcopy; Contraceptive capsule removal; pr hysteroscopy bx endometrium&/polypc w/wo d&c (N/A, 01/06/2020); Tonsillectomy; pr hysteroscopy bx endometrium&/polypc w/wo d&c (N/A, 08/11/2022); pr hysteroscopy endometrial ablation (N/A, 08/11/2022); pr removal intrauterine device iud (N/A, 08/11/2022); Upper gastrointestinal endoscopy; and US breast cyst aspiration left initial (Left, 3/1/2023).  Her family history includes Alcohol abuse in her father; Arthritis in her maternal grandmother and paternal grandmother; Breast cancer (age of onset: 48) in her cousin; Colon cancer (age of onset: 34) in her maternal uncle; Dementia in her paternal grandmother; Depression in her father; Diabetes in her maternal grandfather, maternal grandmother, and paternal grandfather; Glaucoma in her paternal grandmother; Hearing loss in her paternal grandfather; Heart disease in her father, maternal grandmother, mother, and paternal grandmother; Hyperlipidemia in her father and mother; Hypertension in her maternal grandfather, maternal grandmother, paternal grandfather, and paternal grandmother; Mental illness in her maternal grandfather; No Known Problems in her maternal aunt and paternal aunt.  She  reports that she has never smoked. She has never used smokeless tobacco. She reports current alcohol use of about 6.0 standard drinks of alcohol per week. She reports current drug use. Frequency: 7.00 times per week. Drug: Marijuana.  Current Outpatient Medications   Medication Sig Dispense Refill    ALPRAZolam (XANAX) 0.5 mg tablet Take 1 tablet (0.5 mg total) by mouth daily at bedtime as needed for anxiety 30 tablet 0    ascorbic acid (VITAMIN C) 500 mg tablet Take 500 mg by mouth daily      BL EVENING PRIMROSE OIL PO Take by mouth      cyclobenzaprine (FLEXERIL) 10 mg tablet Take 1 tablet (10 mg total) by mouth 3 (three) times a day as needed for muscle spasms 30 tablet 0    ibuprofen (MOTRIN) 200 mg tablet Take 3 tablets (600 mg  total) by mouth every 6 (six) hours as needed for cramping  0    Multiple Vitamin (MULTI-VITAMIN DAILY) TABS Take by mouth daily after breakfast      pantoprazole (PROTONIX) 40 mg tablet Take 1 tablet (40 mg total) by mouth daily 90 tablet 3    phentermine 30 MG capsule Take 1 capsule (30 mg total) by mouth every morning 90 capsule 0    Probiotic Product (PROBIOTIC-10) CAPS Take by mouth daily       promethazine (PHENERGAN) 25 mg tablet Take 1 tablet (25 mg total) by mouth every 6 (six) hours as needed for nausea or vomiting 30 tablet 0    sucralfate (CARAFATE) 1 g tablet TAKE 1 TABLET BY MOUTH FOUR TIMES A DAY AS NEEDED FOR STOMACH PAIN 360 tablet 1     No current facility-administered medications for this visit.     Current Outpatient Medications on File Prior to Visit   Medication Sig    ALPRAZolam (XANAX) 0.5 mg tablet Take 1 tablet (0.5 mg total) by mouth daily at bedtime as needed for anxiety    ascorbic acid (VITAMIN C) 500 mg tablet Take 500 mg by mouth daily    BL EVENING PRIMROSE OIL PO Take by mouth    cyclobenzaprine (FLEXERIL) 10 mg tablet Take 1 tablet (10 mg total) by mouth 3 (three) times a day as needed for muscle spasms    ibuprofen (MOTRIN) 200 mg tablet Take 3 tablets (600 mg total) by mouth every 6 (six) hours as needed for cramping    Multiple Vitamin (MULTI-VITAMIN DAILY) TABS Take by mouth daily after breakfast    pantoprazole (PROTONIX) 40 mg tablet Take 1 tablet (40 mg total) by mouth daily    phentermine 30 MG capsule Take 1 capsule (30 mg total) by mouth every morning    Probiotic Product (PROBIOTIC-10) CAPS Take by mouth daily     promethazine (PHENERGAN) 25 mg tablet Take 1 tablet (25 mg total) by mouth every 6 (six) hours as needed for nausea or vomiting    sucralfate (CARAFATE) 1 g tablet TAKE 1 TABLET BY MOUTH FOUR TIMES A DAY AS NEEDED FOR STOMACH PAIN     No current facility-administered medications on file prior to visit.     She has no known allergies..    Review of Systems  "  Constitutional:  Negative for chills and fever.   HENT:  Negative for congestion, ear pain and sore throat.    Eyes:  Negative for pain.   Respiratory:  Negative for cough and shortness of breath.    Cardiovascular:  Negative for chest pain and leg swelling.   Gastrointestinal:  Negative for abdominal pain, nausea and vomiting.   Endocrine: Negative for polyuria.   Genitourinary:  Negative for difficulty urinating, frequency and urgency.   Musculoskeletal:  Negative for arthralgias and back pain.   Skin:  Negative for rash.   Neurological:  Negative for weakness and headaches.   Psychiatric/Behavioral:  Negative for sleep disturbance. The patient is not nervous/anxious.          Objective:      /76 (BP Location: Left arm, Patient Position: Sitting, Cuff Size: Standard)   Pulse 76   Temp 98.3 °F (36.8 °C) (Temporal)   Ht 5' 6\" (1.676 m)   Wt 79.8 kg (176 lb)   SpO2 98%   BMI 28.41 kg/m²     No results found for this or any previous visit (from the past 8 weeks).     Physical Exam  Constitutional:       Appearance: Normal appearance.   HENT:      Head: Normocephalic.      Right Ear: Tympanic membrane, ear canal and external ear normal.      Left Ear: Tympanic membrane, ear canal and external ear normal.      Nose: Nose normal. No congestion.      Mouth/Throat:      Mouth: Mucous membranes are moist.      Pharynx: Oropharynx is clear. No oropharyngeal exudate or posterior oropharyngeal erythema.   Eyes:      Extraocular Movements: Extraocular movements intact.      Conjunctiva/sclera: Conjunctivae normal.   Cardiovascular:      Rate and Rhythm: Normal rate and regular rhythm.      Heart sounds: Normal heart sounds. No murmur heard.  Pulmonary:      Effort: Pulmonary effort is normal.      Breath sounds: Normal breath sounds. No wheezing or rales.   Abdominal:      General: Abdomen is flat. There is no distension.      Palpations: Abdomen is soft.      Tenderness: There is no abdominal tenderness. "   Musculoskeletal:      Cervical back: Normal range of motion and neck supple.      Right lower leg: No edema.      Left lower leg: No edema.   Lymphadenopathy:      Cervical: No cervical adenopathy.   Skin:     General: Skin is warm.   Neurological:      General: No focal deficit present.      Mental Status: She is alert and oriented to person, place, and time.

## 2025-03-20 DIAGNOSIS — M54.50 LOW BACK PAIN AT MULTIPLE SITES: ICD-10-CM

## 2025-03-21 RX ORDER — PHENTERMINE HYDROCHLORIDE 30 MG/1
30 CAPSULE ORAL EVERY MORNING
Qty: 90 CAPSULE | Refills: 0 | Status: SHIPPED | OUTPATIENT
Start: 2025-03-21

## 2025-03-21 RX ORDER — CYCLOBENZAPRINE HCL 10 MG
10 TABLET ORAL 3 TIMES DAILY PRN
Qty: 30 TABLET | Refills: 0 | Status: SHIPPED | OUTPATIENT
Start: 2025-03-21

## 2025-06-02 ENCOUNTER — OFFICE VISIT (OUTPATIENT)
Dept: INTERNAL MEDICINE CLINIC | Facility: CLINIC | Age: 40
End: 2025-06-02
Payer: COMMERCIAL

## 2025-06-02 VITALS
DIASTOLIC BLOOD PRESSURE: 70 MMHG | SYSTOLIC BLOOD PRESSURE: 122 MMHG | TEMPERATURE: 98.6 F | WEIGHT: 175 LBS | BODY MASS INDEX: 28.12 KG/M2 | HEIGHT: 66 IN | HEART RATE: 84 BPM | OXYGEN SATURATION: 98 %

## 2025-06-02 DIAGNOSIS — K21.9 GASTROESOPHAGEAL REFLUX DISEASE WITHOUT ESOPHAGITIS: ICD-10-CM

## 2025-06-02 DIAGNOSIS — F32.2 SEVERE MAJOR DEPRESSIVE DISORDER (HCC): ICD-10-CM

## 2025-06-02 DIAGNOSIS — R73.01 IMPAIRED FASTING BLOOD SUGAR: Primary | ICD-10-CM

## 2025-06-02 DIAGNOSIS — E78.2 MIXED HYPERLIPIDEMIA: ICD-10-CM

## 2025-06-02 PROCEDURE — 99214 OFFICE O/P EST MOD 30 MIN: CPT | Performed by: INTERNAL MEDICINE

## 2025-06-02 NOTE — ASSESSMENT & PLAN NOTE
Diet and exercise discussed  Orders:  •  Comprehensive metabolic panel; Future  •  Lipid Panel with Direct LDL reflex; Future  •  TSH, 3rd generation; Future

## 2025-06-02 NOTE — PROGRESS NOTES
"Name: Cierra Ramírez      : 1985      MRN: [id number]  Encounter Provider: Jayesh Dill MD  Encounter Date: 2025   Encounter department: [facility] [address]  :  Assessment & Plan  Impaired fasting blood sugar  Diet and exercise discussed  Orders:  •  CBC and differential; Future  •  Comprehensive metabolic panel; Future  •  Lipid Panel with Direct LDL reflex; Future  •  TSH, 3rd generation; Future  •  Hemoglobin A1C; Future    Mixed hyperlipidemia  Diet and exercise discussed  Orders:  •  Comprehensive metabolic panel; Future  •  Lipid Panel with Direct LDL reflex; Future  •  TSH, 3rd generation; Future    Gastroesophageal reflux disease without esophagitis  Continue same med  Orders:  •  CBC and differential; Future    Body mass index 28.0-28.9, adult  Continue same med       Severe major depressive disorder (HCC)                  History of Present Illness   Follow-up on multiple medical problems which are stable on current medications      Review of Systems   Constitutional:  Negative for chills and fever.   HENT:  Negative for congestion, ear pain and sore throat.    Eyes:  Negative for pain.   Respiratory:  Negative for cough and shortness of breath.    Cardiovascular:  Negative for chest pain and leg swelling.   Gastrointestinal:  Negative for abdominal pain, nausea and vomiting.   Endocrine: Negative for polyuria.   Genitourinary:  Negative for difficulty urinating, frequency and urgency.   Musculoskeletal:  Negative for arthralgias and back pain.   Skin:  Negative for rash.   Neurological:  Negative for weakness and headaches.   Psychiatric/Behavioral:  Negative for sleep disturbance. The patient is not nervous/anxious.        Objective   /70 (BP Location: Right arm, Patient Position: Sitting, Cuff Size: Standard)   Pulse 84   Temp 98.6 °F (37 °C) (Temporal)   Ht 5' 6\" (1.676 m)   Wt 79.4 kg (175 lb)   SpO2 98%   BMI 28.25 kg/m²      Physical " Exam  Vitals and nursing note reviewed.   Constitutional:       General: She is not in acute distress.     Appearance: She is well-developed.   HENT:      Head: Normocephalic and atraumatic.      Right Ear: Tympanic membrane, ear canal and external ear normal.      Left Ear: Tympanic membrane, ear canal and external ear normal.      Mouth/Throat:      Mouth: Mucous membranes are moist.      Pharynx: Oropharynx is clear.     Eyes:      Extraocular Movements: Extraocular movements intact.      Conjunctiva/sclera: Conjunctivae normal.       Cardiovascular:      Rate and Rhythm: Normal rate and regular rhythm.      Heart sounds: Normal heart sounds. No murmur heard.  Pulmonary:      Effort: Pulmonary effort is normal. No respiratory distress.      Breath sounds: Normal breath sounds. No wheezing or rales.   Abdominal:      General: Abdomen is flat. There is no distension.      Palpations: Abdomen is soft.      Tenderness: There is no abdominal tenderness.     Musculoskeletal:         General: No swelling.      Cervical back: Neck supple.      Right lower leg: No edema.      Left lower leg: No edema.     Skin:     General: Skin is warm and dry.      Capillary Refill: Capillary refill takes less than 2 seconds.     Neurological:      General: No focal deficit present.      Mental Status: She is alert and oriented to person, place, and time.     Psychiatric:         Mood and Affect: Mood normal.

## 2025-06-02 NOTE — ASSESSMENT & PLAN NOTE
Diet and exercise discussed  Orders:  •  CBC and differential; Future  •  Comprehensive metabolic panel; Future  •  Lipid Panel with Direct LDL reflex; Future  •  TSH, 3rd generation; Future  •  Hemoglobin A1C; Future

## 2025-06-16 ENCOUNTER — ANNUAL EXAM (OUTPATIENT)
Dept: OBGYN CLINIC | Facility: CLINIC | Age: 40
End: 2025-06-16
Payer: COMMERCIAL

## 2025-06-16 VITALS
SYSTOLIC BLOOD PRESSURE: 124 MMHG | HEIGHT: 66 IN | WEIGHT: 177 LBS | DIASTOLIC BLOOD PRESSURE: 70 MMHG | BODY MASS INDEX: 28.45 KG/M2

## 2025-06-16 DIAGNOSIS — Z12.31 ENCOUNTER FOR SCREENING MAMMOGRAM FOR BREAST CANCER: ICD-10-CM

## 2025-06-16 DIAGNOSIS — Z01.419 WOMEN'S ANNUAL ROUTINE GYNECOLOGICAL EXAMINATION: Primary | ICD-10-CM

## 2025-06-16 DIAGNOSIS — Z01.419 PAP SMEAR, AS PART OF ROUTINE GYNECOLOGICAL EXAMINATION: ICD-10-CM

## 2025-06-16 PROCEDURE — S0612 ANNUAL GYNECOLOGICAL EXAMINA: HCPCS | Performed by: OBSTETRICS & GYNECOLOGY

## 2025-06-16 NOTE — PROGRESS NOTES
Assessment/Plan:    No problem-specific Assessment & Plan notes found for this encounter.       Diagnoses and all orders for this visit:    Women's annual routine gynecological examination  -     Thinprep Tis and HPV mRNA E6/E7    Pap smear, as part of routine gynecological examination    Encounter for screening mammogram for breast cancer  -     Mammo screening bilateral w 3d and cad; Future          Normal gynecological physical examination.  Self-breast examination stressed.  Mammogram ordered.  Discussed regular exercise, healthy diet, importance of vitamin D and calcium supplements.  Discussed importance of sun block use during periods of prolonged sun exposure.  Patient will be seen in 1 year for routine gynecologic and medical examination.  Patient will call office for any problems, concerns, or issues which may arise during the interim.     Subjective:          Cierra Ramírez is a 38 yo F with PMH sig for GERD presenting to the office for her yearly exam. Pt reports that she feels well and has no current complaints. UTD on mammograms and paps. BP, blood sugar, and cholesterol are all well controlled and she regularly sees her PCP.     Pt denies vaginal bleeding, spotting, discharge, abdominal pain, pelvic pain, n/v, diarrhea, constipation, dysuria, hematuria, hematochezia, chest pain, sob, hot/cold intolerances, fevers, chills, unintentional weight loss/gain, breast tenderness, masses, or nipple discharge, hot flashes, vaginal dryness.     Informed pt that her physical exam revealed no abnormalitles.     Follow up in one year for next annual exam.     Call office with any questions, comments, or concerns in the interim.       Gynecologic Exam  She reports no pelvic pain or vaginal discharge. Pertinent negatives include no chills, constipation, diarrhea, dysuria, fever, flank pain, frequency, hematuria, nausea, urgency or vomiting.       Patient ID: Cierra Ramírez is a 39 y.o. female who presents  today for her annual gynecologic and medical examination    Menstrual status: S/p endometrial ablation. Pt no longer menstruates.     Vasomotor symptoms: None    Patient reports normal appetite    Patient reports normal bowel and bladder habits    Patient denies any significant pelvic or abdominal pain    Patient denies any headaches, chest pain, shortness of breath fever shakes or chills    Patient denies any COVID 19 symptoms including cough or loss of taste or smell    COVID vaccine status: Patient aware of COVID vaccination protocols.     Medical problems: GERD    Colonoscopy status: Not yet indicated.      Mammogram status: Patient does regular self breast exams and is up-to-date with screening mammography. Appropriate arrangements for her annual screening mammogram are placed into the EMR system at this visit.    The following portions of the patient's history were reviewed and updated as appropriate: allergies, current medications, past family history, past medical history, past social history, past surgical history and problem list.    Review of Systems   Constitutional: Negative.  Negative for appetite change, chills, diaphoresis, fatigue, fever and unexpected weight change.   HENT: Negative.     Eyes: Negative.    Respiratory: Negative.  Negative for shortness of breath.    Cardiovascular: Negative.  Negative for chest pain, palpitations and leg swelling.   Gastrointestinal: Negative.  Negative for blood in stool, constipation, diarrhea, nausea and vomiting.        Followed for GERD   Endocrine: Negative.  Negative for cold intolerance and heat intolerance.   Genitourinary: Negative.  Negative for decreased urine volume, difficulty urinating, dyspareunia, dysuria, flank pain, frequency, genital sores, hematuria, pelvic pain, urgency, vaginal bleeding, vaginal discharge and vaginal pain.        S/p endometrial ablation   Musculoskeletal: Negative.    Skin: Negative.    Allergic/Immunologic: Negative.   "  Neurological: Negative.    All other systems reviewed and are negative.        Objective:      /70   Ht 5' 6\" (1.676 m)   Wt 80.3 kg (177 lb)   BMI 28.57 kg/m²          Physical Exam  Vitals reviewed. Exam conducted with a chaperone present.   Constitutional:       General: She is not in acute distress.     Appearance: Normal appearance.   HENT:      Head: Normocephalic and atraumatic.     Eyes:      Pupils: Pupils are equal, round, and reactive to light.       Cardiovascular:      Rate and Rhythm: Normal rate and regular rhythm.      Heart sounds: No murmur heard.  Pulmonary:      Effort: Pulmonary effort is normal. No respiratory distress.      Breath sounds: Normal breath sounds.   Chest:   Breasts:     Breasts are symmetrical.      Right: Normal. No inverted nipple, mass, nipple discharge or tenderness.      Left: Normal. No inverted nipple, mass, nipple discharge or tenderness.   Abdominal:      Palpations: Abdomen is soft.   Genitourinary:     General: Normal vulva.      Exam position: Supine.      Labia:         Right: No rash or lesion.         Left: No rash or lesion.       Vagina: Normal. No vaginal discharge, erythema, tenderness or lesions.      Cervix: Normal. No discharge, friability or lesion.      Uterus: Normal. Not enlarged and not tender.       Adnexa: Right adnexa normal and left adnexa normal.        Right: No mass, tenderness or fullness.          Left: No mass, tenderness or fullness.       Musculoskeletal:         General: No swelling. Normal range of motion.      Cervical back: Neck supple.   Lymphadenopathy:      Cervical: No cervical adenopathy.      Upper Body:      Right upper body: No supraclavicular adenopathy.      Left upper body: No supraclavicular adenopathy.     Skin:     General: Skin is warm and dry.     Neurological:      General: No focal deficit present.     Psychiatric:         Mood and Affect: Mood normal.         Behavior: Behavior normal.         Thought " Content: Thought content normal.         Judgment: Judgment normal.

## 2025-06-18 LAB
CLINICAL INFO: NORMAL
CYTO CVX: NORMAL
CYTOLOGY CMNT CVX/VAG CYTO-IMP: NORMAL
DATE PREVIOUS BX: NORMAL
HPV E6+E7 MRNA CVX QL NAA+PROBE: NOT DETECTED
LMP START DATE: NORMAL
SL AMB PREV. PAP:: NORMAL
SPECIMEN SOURCE CVX/VAG CYTO: NORMAL

## 2025-06-23 RX ORDER — PHENTERMINE HYDROCHLORIDE 30 MG/1
30 CAPSULE ORAL EVERY MORNING
Qty: 90 CAPSULE | Refills: 0 | Status: SHIPPED | OUTPATIENT
Start: 2025-06-23

## (undated) DEVICE — MAYO STAND COVER: Brand: CONVERTORS

## (undated) DEVICE — INSUFFLATION TUBING PRIMFLO

## (undated) DEVICE — SYRINGE CATH TIP 50ML

## (undated) DEVICE — GLOVE PI ULTRA TOUCH SZ.7.0

## (undated) DEVICE — DEVICE MYOSURE LITE TISSUE REMOVAL HYSTEROSCOPIC

## (undated) DEVICE — MYOSURE SEAL SET

## (undated) DEVICE — GLOVE INDICATOR PI UNDERGLOVE SZ 6.5 BLUE

## (undated) DEVICE — BETHLEHEM UNIVERSAL MINOR VAG: Brand: CARDINAL HEALTH

## (undated) DEVICE — CONVERTORS UNDER BUTTOCKS DRAPE WITH FLUID CONTROL POUCH II: Brand: CONVERTORS

## (undated) DEVICE — PACK FLUENT DISP

## (undated) DEVICE — GLOVE SRG LF STRL BGL SKNSNS 6.5 PF